# Patient Record
Sex: FEMALE | Race: WHITE | Employment: UNEMPLOYED | URBAN - NONMETROPOLITAN AREA
[De-identification: names, ages, dates, MRNs, and addresses within clinical notes are randomized per-mention and may not be internally consistent; named-entity substitution may affect disease eponyms.]

---

## 2017-02-06 ENCOUNTER — OFFICE VISIT (OUTPATIENT)
Dept: PRIMARY CARE CLINIC | Age: 37
End: 2017-02-06
Payer: COMMERCIAL

## 2017-02-06 ENCOUNTER — TELEPHONE (OUTPATIENT)
Dept: PRIMARY CARE CLINIC | Age: 37
End: 2017-02-06

## 2017-02-06 VITALS
BODY MASS INDEX: 31.81 KG/M2 | TEMPERATURE: 98.2 F | HEART RATE: 65 BPM | DIASTOLIC BLOOD PRESSURE: 78 MMHG | WEIGHT: 168.5 LBS | SYSTOLIC BLOOD PRESSURE: 132 MMHG | OXYGEN SATURATION: 96 % | HEIGHT: 61 IN

## 2017-02-06 DIAGNOSIS — R05.9 COUGH: ICD-10-CM

## 2017-02-06 DIAGNOSIS — M54.31 SCIATICA OF RIGHT SIDE: ICD-10-CM

## 2017-02-06 DIAGNOSIS — R50.9 FEVER, UNSPECIFIED FEVER CAUSE: Primary | ICD-10-CM

## 2017-02-06 DIAGNOSIS — J03.90 TONSILLITIS: ICD-10-CM

## 2017-02-06 DIAGNOSIS — R11.0 NAUSEA: ICD-10-CM

## 2017-02-06 DIAGNOSIS — J06.9 UPPER RESPIRATORY TRACT INFECTION, UNSPECIFIED TYPE: ICD-10-CM

## 2017-02-06 DIAGNOSIS — G62.9 NEUROPATHY: ICD-10-CM

## 2017-02-06 LAB
INFLUENZA A ANTIBODY: NORMAL
INFLUENZA B ANTIBODY: NORMAL

## 2017-02-06 PROCEDURE — 87880 STREP A ASSAY W/OPTIC: CPT | Performed by: NURSE PRACTITIONER

## 2017-02-06 PROCEDURE — 99214 OFFICE O/P EST MOD 30 MIN: CPT | Performed by: NURSE PRACTITIONER

## 2017-02-06 PROCEDURE — 87804 INFLUENZA ASSAY W/OPTIC: CPT | Performed by: NURSE PRACTITIONER

## 2017-02-06 RX ORDER — GABAPENTIN 800 MG/1
800 TABLET ORAL 4 TIMES DAILY
COMMUNITY
End: 2017-02-06 | Stop reason: SDUPTHER

## 2017-02-06 RX ORDER — GABAPENTIN 800 MG/1
800 TABLET ORAL 4 TIMES DAILY
Qty: 120 TABLET | Refills: 1 | Status: SHIPPED | OUTPATIENT
Start: 2017-02-06 | End: 2017-03-03 | Stop reason: SDUPTHER

## 2017-02-06 RX ORDER — METOPROLOL SUCCINATE 50 MG/1
TABLET, EXTENDED RELEASE ORAL
Qty: 30 TABLET | Refills: 11 | Status: SHIPPED | OUTPATIENT
Start: 2017-02-06 | End: 2017-12-01 | Stop reason: SDUPTHER

## 2017-02-06 RX ORDER — VENLAFAXINE HYDROCHLORIDE 75 MG/1
75 CAPSULE, EXTENDED RELEASE ORAL DAILY
COMMUNITY
End: 2017-02-06 | Stop reason: SDUPTHER

## 2017-02-06 RX ORDER — VENLAFAXINE HYDROCHLORIDE 75 MG/1
75 CAPSULE, EXTENDED RELEASE ORAL DAILY
Qty: 30 CAPSULE | Refills: 5 | Status: SHIPPED | OUTPATIENT
Start: 2017-02-06 | End: 2017-04-03 | Stop reason: SDUPTHER

## 2017-02-06 RX ORDER — ONDANSETRON 4 MG/1
4 TABLET, ORALLY DISINTEGRATING ORAL EVERY 8 HOURS PRN
Qty: 20 TABLET | Refills: 0 | Status: SHIPPED | OUTPATIENT
Start: 2017-02-06 | End: 2017-02-13 | Stop reason: SDUPTHER

## 2017-02-06 RX ORDER — CEFDINIR 300 MG/1
300 CAPSULE ORAL 2 TIMES DAILY
Qty: 20 CAPSULE | Refills: 0 | Status: SHIPPED | OUTPATIENT
Start: 2017-02-06 | End: 2017-02-16

## 2017-02-06 RX ORDER — BENZONATATE 200 MG/1
200 CAPSULE ORAL 3 TIMES DAILY PRN
Qty: 30 CAPSULE | Refills: 0 | Status: SHIPPED | OUTPATIENT
Start: 2017-02-06 | End: 2017-04-03

## 2017-02-06 ASSESSMENT — ENCOUNTER SYMPTOMS
SORE THROAT: 1
EYE DISCHARGE: 0
RHINORRHEA: 1
COUGH: 1
TROUBLE SWALLOWING: 0
DIARRHEA: 0
BLOOD IN STOOL: 0
BACK PAIN: 1
EYE REDNESS: 0
ABDOMINAL PAIN: 0
WHEEZING: 1
CONSTIPATION: 0

## 2017-02-13 DIAGNOSIS — R11.0 NAUSEA: ICD-10-CM

## 2017-02-13 RX ORDER — ONDANSETRON 4 MG/1
4 TABLET, ORALLY DISINTEGRATING ORAL EVERY 8 HOURS PRN
Qty: 20 TABLET | Refills: 0 | Status: SHIPPED | OUTPATIENT
Start: 2017-02-13 | End: 2017-04-03 | Stop reason: SDUPTHER

## 2017-02-27 DIAGNOSIS — F11.21 OPIOID DEPENDENCE IN REMISSION (HCC): ICD-10-CM

## 2017-02-27 RX ORDER — CYCLOBENZAPRINE HCL 10 MG
10 TABLET ORAL EVERY 8 HOURS PRN
Qty: 30 TABLET | Refills: 1 | Status: SHIPPED | OUTPATIENT
Start: 2017-02-27 | End: 2017-04-25 | Stop reason: SDUPTHER

## 2017-03-03 RX ORDER — GABAPENTIN 800 MG/1
TABLET ORAL
Qty: 120 TABLET | Refills: 3 | Status: SHIPPED | OUTPATIENT
Start: 2017-03-03 | End: 2017-06-23 | Stop reason: SDUPTHER

## 2017-04-03 ENCOUNTER — OFFICE VISIT (OUTPATIENT)
Dept: PRIMARY CARE CLINIC | Age: 37
End: 2017-04-03
Payer: COMMERCIAL

## 2017-04-03 VITALS
HEIGHT: 61 IN | DIASTOLIC BLOOD PRESSURE: 80 MMHG | HEART RATE: 86 BPM | TEMPERATURE: 98.6 F | WEIGHT: 171.75 LBS | SYSTOLIC BLOOD PRESSURE: 118 MMHG | BODY MASS INDEX: 32.42 KG/M2 | OXYGEN SATURATION: 98 %

## 2017-04-03 DIAGNOSIS — F33.41 RECURRENT MAJOR DEPRESSIVE DISORDER, IN PARTIAL REMISSION (HCC): ICD-10-CM

## 2017-04-03 DIAGNOSIS — F41.9 ANXIETY: ICD-10-CM

## 2017-04-03 DIAGNOSIS — G47.09 OTHER INSOMNIA: ICD-10-CM

## 2017-04-03 DIAGNOSIS — R11.0 NAUSEA: ICD-10-CM

## 2017-04-03 DIAGNOSIS — F41.8 SITUATIONAL ANXIETY: ICD-10-CM

## 2017-04-03 DIAGNOSIS — Z79.891 ENCOUNTER FOR LONG-TERM METHADONE USE: Primary | ICD-10-CM

## 2017-04-03 PROCEDURE — 99213 OFFICE O/P EST LOW 20 MIN: CPT | Performed by: NURSE PRACTITIONER

## 2017-04-03 RX ORDER — QUETIAPINE FUMARATE 25 MG/1
25 TABLET, FILM COATED ORAL NIGHTLY
Qty: 30 TABLET | Refills: 5 | Status: SHIPPED | OUTPATIENT
Start: 2017-04-03 | End: 2017-04-04 | Stop reason: SDUPTHER

## 2017-04-03 RX ORDER — HYDROXYZINE PAMOATE 25 MG/1
25 CAPSULE ORAL 3 TIMES DAILY PRN
Qty: 60 CAPSULE | Refills: 5 | Status: SHIPPED | OUTPATIENT
Start: 2017-04-03 | End: 2017-04-17

## 2017-04-03 RX ORDER — ONDANSETRON 4 MG/1
4 TABLET, ORALLY DISINTEGRATING ORAL EVERY 8 HOURS PRN
Qty: 20 TABLET | Refills: 0 | Status: SHIPPED | OUTPATIENT
Start: 2017-04-03 | End: 2017-05-30

## 2017-04-03 RX ORDER — VENLAFAXINE HYDROCHLORIDE 37.5 MG/1
37.5 CAPSULE, EXTENDED RELEASE ORAL DAILY
Qty: 30 CAPSULE | Refills: 5 | Status: SHIPPED | OUTPATIENT
Start: 2017-04-03 | End: 2017-09-15 | Stop reason: SDUPTHER

## 2017-04-03 RX ORDER — VENLAFAXINE HYDROCHLORIDE 75 MG/1
75 CAPSULE, EXTENDED RELEASE ORAL DAILY
Qty: 30 CAPSULE | Refills: 5 | Status: SHIPPED | OUTPATIENT
Start: 2017-04-03 | End: 2018-06-14 | Stop reason: SDUPTHER

## 2017-04-03 ASSESSMENT — ENCOUNTER SYMPTOMS
SORE THROAT: 0
CONSTIPATION: 0
ABDOMINAL PAIN: 0
BLOOD IN STOOL: 0
COUGH: 0
EYE REDNESS: 0
TROUBLE SWALLOWING: 0
RHINORRHEA: 0
EYE DISCHARGE: 0
WHEEZING: 0
DIARRHEA: 0
NAUSEA: 0

## 2017-04-04 ENCOUNTER — TELEPHONE (OUTPATIENT)
Dept: PRIMARY CARE CLINIC | Age: 37
End: 2017-04-04

## 2017-04-04 DIAGNOSIS — F33.41 RECURRENT MAJOR DEPRESSIVE DISORDER, IN PARTIAL REMISSION (HCC): ICD-10-CM

## 2017-04-04 DIAGNOSIS — G47.09 OTHER INSOMNIA: ICD-10-CM

## 2017-04-04 DIAGNOSIS — F41.8 SITUATIONAL ANXIETY: ICD-10-CM

## 2017-04-04 RX ORDER — QUETIAPINE FUMARATE 50 MG/1
50 TABLET, FILM COATED ORAL NIGHTLY
Qty: 30 TABLET | Refills: 2 | Status: SHIPPED | OUTPATIENT
Start: 2017-04-04 | End: 2017-05-27 | Stop reason: SDUPTHER

## 2017-04-25 DIAGNOSIS — F11.21 OPIOID DEPENDENCE IN REMISSION (HCC): ICD-10-CM

## 2017-04-25 RX ORDER — CYCLOBENZAPRINE HCL 10 MG
10 TABLET ORAL EVERY 8 HOURS
Qty: 30 TABLET | Refills: 0 | Status: SHIPPED | OUTPATIENT
Start: 2017-04-25 | End: 2017-09-06 | Stop reason: SDUPTHER

## 2017-05-27 DIAGNOSIS — F41.8 SITUATIONAL ANXIETY: ICD-10-CM

## 2017-05-27 DIAGNOSIS — G47.09 OTHER INSOMNIA: ICD-10-CM

## 2017-05-27 DIAGNOSIS — F33.41 RECURRENT MAJOR DEPRESSIVE DISORDER, IN PARTIAL REMISSION (HCC): ICD-10-CM

## 2017-05-30 ENCOUNTER — OFFICE VISIT (OUTPATIENT)
Dept: PRIMARY CARE CLINIC | Age: 37
End: 2017-05-30
Payer: COMMERCIAL

## 2017-05-30 VITALS
DIASTOLIC BLOOD PRESSURE: 80 MMHG | HEIGHT: 61 IN | WEIGHT: 183 LBS | OXYGEN SATURATION: 95 % | TEMPERATURE: 97.5 F | SYSTOLIC BLOOD PRESSURE: 110 MMHG | BODY MASS INDEX: 34.55 KG/M2 | HEART RATE: 86 BPM

## 2017-05-30 DIAGNOSIS — K12.30 STOMATITIS AND MUCOSITIS: Primary | ICD-10-CM

## 2017-05-30 DIAGNOSIS — F41.8 SITUATIONAL ANXIETY: ICD-10-CM

## 2017-05-30 DIAGNOSIS — R11.0 NAUSEA: ICD-10-CM

## 2017-05-30 DIAGNOSIS — F33.41 RECURRENT MAJOR DEPRESSIVE DISORDER, IN PARTIAL REMISSION (HCC): ICD-10-CM

## 2017-05-30 DIAGNOSIS — J02.9 SORE THROAT: ICD-10-CM

## 2017-05-30 DIAGNOSIS — F32.9 REACTIVE DEPRESSION: ICD-10-CM

## 2017-05-30 DIAGNOSIS — F41.1 GAD (GENERALIZED ANXIETY DISORDER): ICD-10-CM

## 2017-05-30 DIAGNOSIS — G47.09 OTHER INSOMNIA: ICD-10-CM

## 2017-05-30 DIAGNOSIS — K12.1 STOMATITIS AND MUCOSITIS: Primary | ICD-10-CM

## 2017-05-30 LAB — S PYO AG THROAT QL: NORMAL

## 2017-05-30 PROCEDURE — 87880 STREP A ASSAY W/OPTIC: CPT | Performed by: NURSE PRACTITIONER

## 2017-05-30 PROCEDURE — 99213 OFFICE O/P EST LOW 20 MIN: CPT | Performed by: NURSE PRACTITIONER

## 2017-05-30 RX ORDER — QUETIAPINE FUMARATE 50 MG/1
50 TABLET, FILM COATED ORAL NIGHTLY
Qty: 30 TABLET | Refills: 5
Start: 2017-05-30 | End: 2017-05-30 | Stop reason: SDUPTHER

## 2017-05-30 RX ORDER — ONDANSETRON 4 MG/1
4 TABLET, ORALLY DISINTEGRATING ORAL EVERY 8 HOURS PRN
Qty: 30 TABLET | Refills: 1 | Status: SHIPPED | OUTPATIENT
Start: 2017-05-30 | End: 2017-06-23 | Stop reason: SDUPTHER

## 2017-05-30 RX ORDER — HYDROXYZINE PAMOATE 50 MG/1
50 CAPSULE ORAL 3 TIMES DAILY PRN
Qty: 90 CAPSULE | Refills: 1 | Status: SHIPPED | OUTPATIENT
Start: 2017-05-30 | End: 2017-06-26 | Stop reason: SDUPTHER

## 2017-05-30 RX ORDER — QUETIAPINE FUMARATE 100 MG/1
150 TABLET, FILM COATED ORAL NIGHTLY
Qty: 45 TABLET | Refills: 3 | Status: SHIPPED | OUTPATIENT
Start: 2017-05-30 | End: 2017-08-25 | Stop reason: SDUPTHER

## 2017-05-30 RX ORDER — HYDROXYZINE PAMOATE 25 MG/1
CAPSULE ORAL
Refills: 5 | COMMUNITY
Start: 2017-05-01 | End: 2017-05-30 | Stop reason: SDUPTHER

## 2017-05-30 ASSESSMENT — ENCOUNTER SYMPTOMS
SHORTNESS OF BREATH: 0
EYE REDNESS: 0
NAUSEA: 1
CONSTIPATION: 0
RHINORRHEA: 0
SORE THROAT: 1
DIARRHEA: 0
COUGH: 1
VOMITING: 0

## 2017-06-23 DIAGNOSIS — R11.0 NAUSEA: ICD-10-CM

## 2017-06-23 RX ORDER — ONDANSETRON 4 MG/1
4 TABLET, ORALLY DISINTEGRATING ORAL EVERY 8 HOURS PRN
Qty: 30 TABLET | Refills: 0 | Status: SHIPPED | OUTPATIENT
Start: 2017-06-23 | End: 2017-06-27

## 2017-06-23 RX ORDER — VENLAFAXINE HYDROCHLORIDE 75 MG/1
75 CAPSULE, EXTENDED RELEASE ORAL DAILY
Qty: 30 CAPSULE | Refills: 5 | Status: SHIPPED | OUTPATIENT
Start: 2017-06-23 | End: 2017-07-07 | Stop reason: SDUPTHER

## 2017-06-23 RX ORDER — GABAPENTIN 800 MG/1
800 TABLET ORAL 4 TIMES DAILY
Qty: 120 TABLET | Refills: 0 | Status: SHIPPED | OUTPATIENT
Start: 2017-06-23 | End: 2017-07-21 | Stop reason: SDUPTHER

## 2017-06-26 DIAGNOSIS — F41.1 GAD (GENERALIZED ANXIETY DISORDER): ICD-10-CM

## 2017-06-26 DIAGNOSIS — G47.09 OTHER INSOMNIA: ICD-10-CM

## 2017-06-26 DIAGNOSIS — F33.41 RECURRENT MAJOR DEPRESSIVE DISORDER, IN PARTIAL REMISSION (HCC): ICD-10-CM

## 2017-06-26 DIAGNOSIS — F41.8 SITUATIONAL ANXIETY: ICD-10-CM

## 2017-06-26 RX ORDER — QUETIAPINE FUMARATE 50 MG/1
TABLET, FILM COATED ORAL
Qty: 30 TABLET | Refills: 5 | Status: SHIPPED | OUTPATIENT
Start: 2017-06-26 | End: 2017-11-16 | Stop reason: SDUPTHER

## 2017-06-26 RX ORDER — HYDROXYZINE PAMOATE 50 MG/1
CAPSULE ORAL
Qty: 90 CAPSULE | Refills: 5 | Status: SHIPPED | OUTPATIENT
Start: 2017-06-26 | End: 2017-07-28

## 2017-06-27 ENCOUNTER — APPOINTMENT (OUTPATIENT)
Dept: GENERAL RADIOLOGY | Age: 37
DRG: 195 | End: 2017-06-27
Payer: COMMERCIAL

## 2017-06-27 ENCOUNTER — HOSPITAL ENCOUNTER (INPATIENT)
Age: 37
LOS: 1 days | Discharge: LEFT BEFORE DISCHARGE | DRG: 195 | End: 2017-06-28
Attending: EMERGENCY MEDICINE | Admitting: HOSPITALIST
Payer: COMMERCIAL

## 2017-06-27 ENCOUNTER — OFFICE VISIT (OUTPATIENT)
Dept: PRIMARY CARE CLINIC | Age: 37
End: 2017-06-27
Payer: COMMERCIAL

## 2017-06-27 VITALS
SYSTOLIC BLOOD PRESSURE: 134 MMHG | BODY MASS INDEX: 34.73 KG/M2 | WEIGHT: 196 LBS | TEMPERATURE: 98 F | HEIGHT: 63 IN | HEART RATE: 92 BPM | OXYGEN SATURATION: 90 % | DIASTOLIC BLOOD PRESSURE: 86 MMHG

## 2017-06-27 DIAGNOSIS — R09.02 HYPOXIA: Primary | ICD-10-CM

## 2017-06-27 DIAGNOSIS — J44.1 CHRONIC OBSTRUCTIVE PULMONARY DISEASE WITH ACUTE EXACERBATION (HCC): ICD-10-CM

## 2017-06-27 DIAGNOSIS — J18.9 PNEUMONIA DUE TO ORGANISM: ICD-10-CM

## 2017-06-27 DIAGNOSIS — J18.9 CAP (COMMUNITY ACQUIRED PNEUMONIA): Primary | ICD-10-CM

## 2017-06-27 LAB
ALBUMIN SERPL-MCNC: 3.5 G/DL (ref 3.5–5.2)
ALP BLD-CCNC: 74 U/L (ref 35–104)
ALT SERPL-CCNC: 24 U/L (ref 5–33)
ANION GAP SERPL CALCULATED.3IONS-SCNC: 11 MMOL/L (ref 7–19)
AST SERPL-CCNC: 31 U/L (ref 5–32)
ATYPICAL LYMPHOCYTE RELATIVE PERCENT: 1 % (ref 0–8)
BANDED NEUTROPHILS RELATIVE PERCENT: 11 % (ref 0–5)
BASE EXCESS ARTERIAL: 4 MMOL/L (ref -2–2)
BASOPHILS ABSOLUTE: 0.3 K/UL (ref 0–0.2)
BASOPHILS MANUAL: 2 %
BASOPHILS RELATIVE PERCENT: 2 % (ref 0–1)
BILIRUB SERPL-MCNC: 0.4 MG/DL (ref 0.2–1.2)
BILIRUBIN URINE: NEGATIVE
BLOOD, URINE: NEGATIVE
BUN BLDV-MCNC: 10 MG/DL (ref 6–20)
CALCIUM SERPL-MCNC: 8.6 MG/DL (ref 8.6–10)
CARBOXYHEMOGLOBIN ARTERIAL: 5.2 % (ref 0–5)
CHLORIDE BLD-SCNC: 99 MMOL/L (ref 98–111)
CLARITY: CLEAR
CO2: 27 MMOL/L (ref 22–29)
COLOR: YELLOW
CREAT SERPL-MCNC: 0.7 MG/DL (ref 0.5–0.9)
EOSINOPHILS ABSOLUTE: 0.14 K/UL (ref 0–0.6)
EOSINOPHILS RELATIVE PERCENT: 1 % (ref 0–5)
GFR NON-AFRICAN AMERICAN: >60
GLUCOSE BLD-MCNC: 119 MG/DL (ref 74–109)
GLUCOSE URINE: NEGATIVE MG/DL
HCG(URINE) PREGNANCY TEST: NEGATIVE
HCO3 ARTERIAL: 30.9 MMOL/L (ref 22–26)
HCT VFR BLD CALC: 37.2 % (ref 37–47)
HEMOGLOBIN, ART, EXTENDED: 12.7 G/DL (ref 12–16)
HEMOGLOBIN: 12 G/DL (ref 12–16)
KETONES, URINE: NEGATIVE MG/DL
LACTIC ACID: 2.3 MG/DL (ref 0.5–1.9)
LEUKOCYTE ESTERASE, URINE: NEGATIVE
LYMPHOCYTES ABSOLUTE: 0.7 K/UL (ref 1.1–4.5)
LYMPHOCYTES RELATIVE PERCENT: 4 % (ref 20–40)
MACROCYTES: ABNORMAL
MAGNESIUM: 2 MG/DL (ref 1.6–2.6)
MCH RBC QN AUTO: 30.8 PG (ref 27–31)
MCHC RBC AUTO-ENTMCNC: 32.3 G/DL (ref 33–37)
MCV RBC AUTO: 95.6 FL (ref 81–99)
METHEMOGLOBIN ARTERIAL: 1.2 %
MICROCYTES: ABNORMAL
MONOCYTES ABSOLUTE: 0.9 K/UL (ref 0–0.9)
MONOCYTES RELATIVE PERCENT: 6 % (ref 0–10)
NEUTROPHILS ABSOLUTE: 12.3 K/UL (ref 1.5–7.5)
NEUTROPHILS MANUAL: 75 %
NEUTROPHILS RELATIVE PERCENT: 75 % (ref 50–65)
NITRITE, URINE: NEGATIVE
O2 CONTENT ARTERIAL: 16.3 ML/DL
O2 SAT, ARTERIAL: 90.8 %
O2 THERAPY: ABNORMAL
PCO2 ARTERIAL: 56 MMHG (ref 35–45)
PDW BLD-RTO: 12.9 % (ref 11.5–14.5)
PH ARTERIAL: 7.35 (ref 7.35–7.45)
PH UA: 7.5
PLATELET # BLD: 220 K/UL (ref 130–400)
PLATELET SLIDE REVIEW: ADEQUATE
PMV BLD AUTO: 9.2 FL (ref 9.4–12.3)
PO2 ARTERIAL: 71 MMHG (ref 80–100)
POTASSIUM SERPL-SCNC: 4.5 MMOL/L (ref 3.5–5)
POTASSIUM, WHOLE BLOOD: 4.4
PROTEIN UA: NEGATIVE MG/DL
RBC # BLD: 3.89 M/UL (ref 4.2–5.4)
SODIUM BLD-SCNC: 137 MMOL/L (ref 136–145)
SPECIFIC GRAVITY UA: 1.02
TOTAL PROTEIN: 6.6 G/DL (ref 6.6–8.7)
UROBILINOGEN, URINE: 1 E.U./DL
WBC # BLD: 14.3 K/UL (ref 4.8–10.8)

## 2017-06-27 PROCEDURE — 81025 URINE PREGNANCY TEST: CPT

## 2017-06-27 PROCEDURE — 87040 BLOOD CULTURE FOR BACTERIA: CPT

## 2017-06-27 PROCEDURE — 2000000000 HC ICU R&B

## 2017-06-27 PROCEDURE — 6360000002 HC RX W HCPCS: Performed by: EMERGENCY MEDICINE

## 2017-06-27 PROCEDURE — 2700000000 HC OXYGEN THERAPY PER DAY

## 2017-06-27 PROCEDURE — 6370000000 HC RX 637 (ALT 250 FOR IP): Performed by: FAMILY MEDICINE

## 2017-06-27 PROCEDURE — 87086 URINE CULTURE/COLONY COUNT: CPT

## 2017-06-27 PROCEDURE — 83605 ASSAY OF LACTIC ACID: CPT

## 2017-06-27 PROCEDURE — 96374 THER/PROPH/DIAG INJ IV PUSH: CPT

## 2017-06-27 PROCEDURE — 36415 COLL VENOUS BLD VENIPUNCTURE: CPT

## 2017-06-27 PROCEDURE — 86403 PARTICLE AGGLUT ANTBDY SCRN: CPT

## 2017-06-27 PROCEDURE — 6370000000 HC RX 637 (ALT 250 FOR IP): Performed by: EMERGENCY MEDICINE

## 2017-06-27 PROCEDURE — 85025 COMPLETE CBC W/AUTO DIFF WBC: CPT

## 2017-06-27 PROCEDURE — 80053 COMPREHEN METABOLIC PANEL: CPT

## 2017-06-27 PROCEDURE — 87077 CULTURE AEROBIC IDENTIFY: CPT

## 2017-06-27 PROCEDURE — 87185 SC STD ENZYME DETCJ PER NZM: CPT

## 2017-06-27 PROCEDURE — 96375 TX/PRO/DX INJ NEW DRUG ADDON: CPT

## 2017-06-27 PROCEDURE — 2580000003 HC RX 258: Performed by: EMERGENCY MEDICINE

## 2017-06-27 PROCEDURE — 83735 ASSAY OF MAGNESIUM: CPT

## 2017-06-27 PROCEDURE — 84132 ASSAY OF SERUM POTASSIUM: CPT

## 2017-06-27 PROCEDURE — 6360000002 HC RX W HCPCS: Performed by: FAMILY MEDICINE

## 2017-06-27 PROCEDURE — 94640 AIRWAY INHALATION TREATMENT: CPT

## 2017-06-27 PROCEDURE — 93005 ELECTROCARDIOGRAM TRACING: CPT

## 2017-06-27 PROCEDURE — 81003 URINALYSIS AUTO W/O SCOPE: CPT

## 2017-06-27 PROCEDURE — 99214 OFFICE O/P EST MOD 30 MIN: CPT | Performed by: NURSE PRACTITIONER

## 2017-06-27 PROCEDURE — 99285 EMERGENCY DEPT VISIT HI MDM: CPT

## 2017-06-27 PROCEDURE — 99284 EMERGENCY DEPT VISIT MOD MDM: CPT | Performed by: EMERGENCY MEDICINE

## 2017-06-27 PROCEDURE — 87070 CULTURE OTHR SPECIMN AEROBIC: CPT

## 2017-06-27 PROCEDURE — 82803 BLOOD GASES ANY COMBINATION: CPT

## 2017-06-27 PROCEDURE — 36600 WITHDRAWAL OF ARTERIAL BLOOD: CPT

## 2017-06-27 PROCEDURE — 99223 1ST HOSP IP/OBS HIGH 75: CPT | Performed by: FAMILY MEDICINE

## 2017-06-27 PROCEDURE — 87205 SMEAR GRAM STAIN: CPT

## 2017-06-27 PROCEDURE — 2580000003 HC RX 258: Performed by: FAMILY MEDICINE

## 2017-06-27 PROCEDURE — 71010 XR CHEST PORTABLE: CPT

## 2017-06-27 RX ORDER — METHYLPREDNISOLONE SODIUM SUCCINATE 125 MG/2ML
125 INJECTION, POWDER, LYOPHILIZED, FOR SOLUTION INTRAMUSCULAR; INTRAVENOUS ONCE
Status: COMPLETED | OUTPATIENT
Start: 2017-06-27 | End: 2017-06-27

## 2017-06-27 RX ORDER — LEVOFLOXACIN 5 MG/ML
750 INJECTION, SOLUTION INTRAVENOUS ONCE
Status: COMPLETED | OUTPATIENT
Start: 2017-06-27 | End: 2017-06-27

## 2017-06-27 RX ORDER — SODIUM CHLORIDE 0.9 % (FLUSH) 0.9 %
10 SYRINGE (ML) INJECTION PRN
Status: DISCONTINUED | OUTPATIENT
Start: 2017-06-27 | End: 2017-06-28 | Stop reason: HOSPADM

## 2017-06-27 RX ORDER — 0.9 % SODIUM CHLORIDE 0.9 %
1000 INTRAVENOUS SOLUTION INTRAVENOUS ONCE
Status: COMPLETED | OUTPATIENT
Start: 2017-06-27 | End: 2017-06-27

## 2017-06-27 RX ORDER — ONDANSETRON 2 MG/ML
4 INJECTION INTRAMUSCULAR; INTRAVENOUS EVERY 6 HOURS PRN
Status: DISCONTINUED | OUTPATIENT
Start: 2017-06-27 | End: 2017-06-28 | Stop reason: HOSPADM

## 2017-06-27 RX ORDER — DIAZEPAM 5 MG/1
5 TABLET ORAL ONCE
Status: DISCONTINUED | OUTPATIENT
Start: 2017-06-27 | End: 2017-06-27

## 2017-06-27 RX ORDER — DIAZEPAM 5 MG/1
5 TABLET ORAL ONCE
Status: COMPLETED | OUTPATIENT
Start: 2017-06-27 | End: 2017-06-27

## 2017-06-27 RX ORDER — SODIUM CHLORIDE 0.9 % (FLUSH) 0.9 %
10 SYRINGE (ML) INJECTION EVERY 12 HOURS SCHEDULED
Status: DISCONTINUED | OUTPATIENT
Start: 2017-06-27 | End: 2017-06-28 | Stop reason: HOSPADM

## 2017-06-27 RX ORDER — CEFTRIAXONE 500 MG/1
1000 INJECTION, POWDER, FOR SOLUTION INTRAMUSCULAR; INTRAVENOUS ONCE
Status: DISCONTINUED | OUTPATIENT
Start: 2017-06-27 | End: 2017-06-28

## 2017-06-27 RX ORDER — HYDROXYZINE PAMOATE 25 MG/1
25 CAPSULE ORAL 3 TIMES DAILY PRN
Status: DISCONTINUED | OUTPATIENT
Start: 2017-06-27 | End: 2017-06-28 | Stop reason: HOSPADM

## 2017-06-27 RX ORDER — GABAPENTIN 400 MG/1
800 CAPSULE ORAL 3 TIMES DAILY
Status: DISCONTINUED | OUTPATIENT
Start: 2017-06-27 | End: 2017-06-28 | Stop reason: HOSPADM

## 2017-06-27 RX ORDER — SODIUM CHLORIDE 9 MG/ML
INJECTION, SOLUTION INTRAVENOUS CONTINUOUS
Status: DISCONTINUED | OUTPATIENT
Start: 2017-06-27 | End: 2017-06-28 | Stop reason: HOSPADM

## 2017-06-27 RX ORDER — CYCLOBENZAPRINE HCL 10 MG
10 TABLET ORAL EVERY 8 HOURS
Status: DISCONTINUED | OUTPATIENT
Start: 2017-06-27 | End: 2017-06-28 | Stop reason: HOSPADM

## 2017-06-27 RX ORDER — ACETAMINOPHEN 325 MG/1
650 TABLET ORAL EVERY 4 HOURS PRN
Status: DISCONTINUED | OUTPATIENT
Start: 2017-06-27 | End: 2017-06-28 | Stop reason: HOSPADM

## 2017-06-27 RX ORDER — ALBUTEROL SULFATE 2.5 MG/3ML
2.5 SOLUTION RESPIRATORY (INHALATION) ONCE
Status: DISCONTINUED | OUTPATIENT
Start: 2017-06-27 | End: 2017-06-28

## 2017-06-27 RX ORDER — ACETAMINOPHEN 500 MG
1000 TABLET ORAL ONCE
Status: COMPLETED | OUTPATIENT
Start: 2017-06-27 | End: 2017-06-27

## 2017-06-27 RX ORDER — ONDANSETRON 2 MG/ML
4 INJECTION INTRAMUSCULAR; INTRAVENOUS ONCE
Status: COMPLETED | OUTPATIENT
Start: 2017-06-27 | End: 2017-06-27

## 2017-06-27 RX ORDER — ALBUTEROL SULFATE 2.5 MG/3ML
2.5 SOLUTION RESPIRATORY (INHALATION)
Status: DISCONTINUED | OUTPATIENT
Start: 2017-06-27 | End: 2017-06-28 | Stop reason: HOSPADM

## 2017-06-27 RX ORDER — LEVOFLOXACIN 5 MG/ML
750 INJECTION, SOLUTION INTRAVENOUS EVERY 24 HOURS
Status: DISCONTINUED | OUTPATIENT
Start: 2017-06-28 | End: 2017-06-28 | Stop reason: HOSPADM

## 2017-06-27 RX ORDER — NICOTINE 21 MG/24HR
1 PATCH, TRANSDERMAL 24 HOURS TRANSDERMAL DAILY
Status: DISCONTINUED | OUTPATIENT
Start: 2017-06-27 | End: 2017-06-28 | Stop reason: HOSPADM

## 2017-06-27 RX ORDER — DOXYCYCLINE 100 MG/1
100 CAPSULE ORAL 2 TIMES DAILY
Qty: 20 CAPSULE | Refills: 0 | Status: SHIPPED | OUTPATIENT
Start: 2017-06-27 | End: 2017-06-27

## 2017-06-27 RX ORDER — ALBUTEROL SULFATE 2.5 MG/3ML
2.5 SOLUTION RESPIRATORY (INHALATION) EVERY 6 HOURS PRN
Qty: 120 EACH | Refills: 3 | Status: SHIPPED | OUTPATIENT
Start: 2017-06-27 | End: 2017-06-27

## 2017-06-27 RX ORDER — PANTOPRAZOLE SODIUM 40 MG/1
40 TABLET, DELAYED RELEASE ORAL DAILY
Status: DISCONTINUED | OUTPATIENT
Start: 2017-06-28 | End: 2017-06-28 | Stop reason: HOSPADM

## 2017-06-27 RX ORDER — KETOROLAC TROMETHAMINE 30 MG/ML
30 INJECTION, SOLUTION INTRAMUSCULAR; INTRAVENOUS ONCE
Status: COMPLETED | OUTPATIENT
Start: 2017-06-27 | End: 2017-06-27

## 2017-06-27 RX ADMIN — ALBUTEROL SULFATE 2.5 MG: 2.5 SOLUTION RESPIRATORY (INHALATION) at 13:04

## 2017-06-27 RX ADMIN — METHYLPREDNISOLONE SODIUM SUCCINATE 125 MG: 125 INJECTION, POWDER, FOR SOLUTION INTRAMUSCULAR; INTRAVENOUS at 13:34

## 2017-06-27 RX ADMIN — DIAZEPAM 5 MG: 5 TABLET ORAL at 13:18

## 2017-06-27 RX ADMIN — GABAPENTIN 800 MG: 400 CAPSULE ORAL at 20:17

## 2017-06-27 RX ADMIN — ALBUTEROL SULFATE 2.5 MG: 2.5 SOLUTION RESPIRATORY (INHALATION) at 19:52

## 2017-06-27 RX ADMIN — HYDROXYZINE PAMOATE 25 MG: 25 CAPSULE ORAL at 20:17

## 2017-06-27 RX ADMIN — ONDANSETRON 4 MG: 2 INJECTION INTRAMUSCULAR; INTRAVENOUS at 13:34

## 2017-06-27 RX ADMIN — Medication 10 ML: at 20:17

## 2017-06-27 RX ADMIN — KETOROLAC TROMETHAMINE 30 MG: 30 INJECTION, SOLUTION INTRAMUSCULAR at 13:33

## 2017-06-27 RX ADMIN — ONDANSETRON 4 MG: 2 INJECTION INTRAMUSCULAR; INTRAVENOUS at 20:17

## 2017-06-27 RX ADMIN — ACETAMINOPHEN 1000 MG: 500 TABLET ORAL at 15:51

## 2017-06-27 RX ADMIN — IPRATROPIUM BROMIDE 0.5 MG: 0.5 SOLUTION RESPIRATORY (INHALATION) at 13:04

## 2017-06-27 RX ADMIN — CYCLOBENZAPRINE HYDROCHLORIDE 10 MG: 10 TABLET, FILM COATED ORAL at 18:11

## 2017-06-27 RX ADMIN — MAGNESIUM SULFATE HEPTAHYDRATE 2 G: 500 INJECTION, SOLUTION INTRAMUSCULAR; INTRAVENOUS at 13:34

## 2017-06-27 RX ADMIN — SODIUM CHLORIDE 1000 ML: 9 INJECTION, SOLUTION INTRAVENOUS at 13:34

## 2017-06-27 RX ADMIN — SODIUM CHLORIDE 1000 ML: 9 INJECTION, SOLUTION INTRAVENOUS at 14:44

## 2017-06-27 RX ADMIN — SODIUM CHLORIDE: 9 INJECTION, SOLUTION INTRAVENOUS at 18:03

## 2017-06-27 RX ADMIN — LEVOFLOXACIN 750 MG: 5 INJECTION, SOLUTION INTRAVENOUS at 15:51

## 2017-06-27 RX ADMIN — SODIUM CHLORIDE 1000 ML: 9 INJECTION, SOLUTION INTRAVENOUS at 15:36

## 2017-06-27 RX ADMIN — ENOXAPARIN SODIUM 40 MG: 40 INJECTION SUBCUTANEOUS at 18:11

## 2017-06-27 ASSESSMENT — ENCOUNTER SYMPTOMS
VOMITING: 0
EYE PAIN: 0
EYE REDNESS: 0
SHORTNESS OF BREATH: 1
SORE THROAT: 0
CONSTIPATION: 0
DIARRHEA: 0
RHINORRHEA: 0
COUGH: 1
SORE THROAT: 1
RHINORRHEA: 0
NAUSEA: 0
PHOTOPHOBIA: 0
BACK PAIN: 0
ABDOMINAL PAIN: 0
WHEEZING: 1
WHEEZING: 1
SHORTNESS OF BREATH: 1
DIARRHEA: 0
CHEST TIGHTNESS: 0
COUGH: 1
VOMITING: 0

## 2017-06-27 ASSESSMENT — PAIN SCALES - GENERAL
PAINLEVEL_OUTOF10: 0
PAINLEVEL_OUTOF10: 8
PAINLEVEL_OUTOF10: 8

## 2017-06-27 ASSESSMENT — PAIN DESCRIPTION - ORIENTATION: ORIENTATION: LEFT

## 2017-06-27 ASSESSMENT — PAIN DESCRIPTION - LOCATION: LOCATION: BACK;HIP

## 2017-06-28 ENCOUNTER — TELEPHONE (OUTPATIENT)
Dept: PRIMARY CARE CLINIC | Age: 37
End: 2017-06-28

## 2017-06-28 VITALS
TEMPERATURE: 97.1 F | OXYGEN SATURATION: 96 % | HEART RATE: 95 BPM | WEIGHT: 190 LBS | RESPIRATION RATE: 20 BRPM | BODY MASS INDEX: 33.66 KG/M2 | DIASTOLIC BLOOD PRESSURE: 86 MMHG | SYSTOLIC BLOOD PRESSURE: 126 MMHG | HEIGHT: 63 IN

## 2017-06-28 PROBLEM — J18.9 CAP (COMMUNITY ACQUIRED PNEUMONIA): Status: ACTIVE | Noted: 2017-06-27

## 2017-06-28 PROBLEM — J18.9 CAP (COMMUNITY ACQUIRED PNEUMONIA): Status: ACTIVE | Noted: 2017-06-28

## 2017-06-28 PROBLEM — I95.9 HYPOTENSION: Status: ACTIVE | Noted: 2017-06-27

## 2017-06-28 PROBLEM — I95.9 HYPOTENSION: Status: ACTIVE | Noted: 2017-06-28

## 2017-06-28 LAB
ANION GAP SERPL CALCULATED.3IONS-SCNC: 10 MMOL/L (ref 7–19)
BANDED NEUTROPHILS RELATIVE PERCENT: 3 % (ref 0–5)
BASOPHILS ABSOLUTE: 0 K/UL (ref 0–0.2)
BASOPHILS MANUAL: 0 %
BASOPHILS RELATIVE PERCENT: 0 % (ref 0–1)
BUN BLDV-MCNC: 12 MG/DL (ref 6–20)
CALCIUM SERPL-MCNC: 7.9 MG/DL (ref 8.6–10)
CHLORIDE BLD-SCNC: 108 MMOL/L (ref 98–111)
CO2: 24 MMOL/L (ref 22–29)
CREAT SERPL-MCNC: 0.6 MG/DL (ref 0.5–0.9)
EOSINOPHILS ABSOLUTE: 0 K/UL (ref 0–0.6)
EOSINOPHILS RELATIVE PERCENT: 0 % (ref 0–5)
GFR NON-AFRICAN AMERICAN: >60
GLUCOSE BLD-MCNC: 167 MG/DL (ref 74–109)
HCT VFR BLD CALC: 34.6 % (ref 37–47)
HEMOGLOBIN: 11 G/DL (ref 12–16)
LYMPHOCYTES ABSOLUTE: 1.2 K/UL (ref 1.1–4.5)
LYMPHOCYTES RELATIVE PERCENT: 9 % (ref 20–40)
MCH RBC QN AUTO: 31.4 PG (ref 27–31)
MCHC RBC AUTO-ENTMCNC: 31.8 G/DL (ref 33–37)
MCV RBC AUTO: 98.9 FL (ref 81–99)
MONOCYTES ABSOLUTE: 0.5 K/UL (ref 0–0.9)
MONOCYTES RELATIVE PERCENT: 4 % (ref 0–10)
NEUTROPHILS ABSOLUTE: 11.5 K/UL (ref 1.5–7.5)
NEUTROPHILS MANUAL: 84 %
NEUTROPHILS RELATIVE PERCENT: 84 % (ref 50–65)
PDW BLD-RTO: 13.1 % (ref 11.5–14.5)
PLATELET # BLD: 244 K/UL (ref 130–400)
PLATELET SLIDE REVIEW: ADEQUATE
PMV BLD AUTO: 9.3 FL (ref 9.4–12.3)
POTASSIUM SERPL-SCNC: 4.1 MMOL/L (ref 3.5–5)
RBC # BLD: 3.5 M/UL (ref 4.2–5.4)
RBC # BLD: NORMAL 10*6/UL
SODIUM BLD-SCNC: 142 MMOL/L (ref 136–145)
WBC # BLD: 13.2 K/UL (ref 4.8–10.8)

## 2017-06-28 PROCEDURE — 99239 HOSP IP/OBS DSCHRG MGMT >30: CPT | Performed by: FAMILY MEDICINE

## 2017-06-28 PROCEDURE — 2700000000 HC OXYGEN THERAPY PER DAY

## 2017-06-28 PROCEDURE — 80048 BASIC METABOLIC PNL TOTAL CA: CPT

## 2017-06-28 PROCEDURE — 36415 COLL VENOUS BLD VENIPUNCTURE: CPT

## 2017-06-28 PROCEDURE — 2580000003 HC RX 258: Performed by: FAMILY MEDICINE

## 2017-06-28 PROCEDURE — 6370000000 HC RX 637 (ALT 250 FOR IP): Performed by: FAMILY MEDICINE

## 2017-06-28 PROCEDURE — 85025 COMPLETE CBC W/AUTO DIFF WBC: CPT

## 2017-06-28 RX ORDER — LEVOFLOXACIN 500 MG/1
500 TABLET, FILM COATED ORAL DAILY
Qty: 12 TABLET | Refills: 0 | Status: SHIPPED | OUTPATIENT
Start: 2017-06-28 | End: 2017-07-07 | Stop reason: ALTCHOICE

## 2017-06-28 RX ORDER — VENLAFAXINE HYDROCHLORIDE 37.5 MG/1
37.5 CAPSULE, EXTENDED RELEASE ORAL DAILY
Status: CANCELLED | OUTPATIENT
Start: 2017-06-28

## 2017-06-28 RX ORDER — VENLAFAXINE HYDROCHLORIDE 75 MG/1
75 CAPSULE, EXTENDED RELEASE ORAL DAILY
Status: CANCELLED | OUTPATIENT
Start: 2017-06-28

## 2017-06-28 RX ORDER — METHADONE HYDROCHLORIDE 10 MG/1
120 TABLET ORAL DAILY
Status: DISCONTINUED | OUTPATIENT
Start: 2017-06-28 | End: 2017-06-28 | Stop reason: HOSPADM

## 2017-06-28 RX ORDER — SODIUM CHLORIDE 9 MG/ML
INJECTION, SOLUTION INTRAVENOUS CONTINUOUS
Status: CANCELLED | OUTPATIENT
Start: 2017-06-28

## 2017-06-28 RX ADMIN — CYCLOBENZAPRINE HYDROCHLORIDE 10 MG: 10 TABLET, FILM COATED ORAL at 08:23

## 2017-06-28 RX ADMIN — METHADONE HYDROCHLORIDE 120 MG: 10 TABLET ORAL at 09:47

## 2017-06-28 RX ADMIN — GABAPENTIN 800 MG: 400 CAPSULE ORAL at 08:23

## 2017-06-28 RX ADMIN — Medication 10 ML: at 08:24

## 2017-06-28 RX ADMIN — CYCLOBENZAPRINE HYDROCHLORIDE 10 MG: 10 TABLET, FILM COATED ORAL at 01:47

## 2017-06-28 RX ADMIN — SODIUM CHLORIDE: 9 INJECTION, SOLUTION INTRAVENOUS at 01:36

## 2017-06-28 RX ADMIN — PANTOPRAZOLE SODIUM 40 MG: 40 TABLET, DELAYED RELEASE ORAL at 08:23

## 2017-06-28 RX ADMIN — HYDROXYZINE PAMOATE 25 MG: 25 CAPSULE ORAL at 08:28

## 2017-06-28 RX ADMIN — ACETAMINOPHEN 650 MG: 325 TABLET, FILM COATED ORAL at 01:47

## 2017-06-28 ASSESSMENT — PAIN SCALES - GENERAL
PAINLEVEL_OUTOF10: 10
PAINLEVEL_OUTOF10: 8
PAINLEVEL_OUTOF10: 8
PAINLEVEL_OUTOF10: 0

## 2017-06-28 ASSESSMENT — PAIN DESCRIPTION - LOCATION: LOCATION: BACK

## 2017-06-28 ASSESSMENT — PAIN DESCRIPTION - PAIN TYPE: TYPE: CHRONIC PAIN

## 2017-06-28 ASSESSMENT — PAIN DESCRIPTION - DESCRIPTORS: DESCRIPTORS: DISCOMFORT;SORE;SPASM

## 2017-06-28 ASSESSMENT — PAIN DESCRIPTION - FREQUENCY: FREQUENCY: INTERMITTENT

## 2017-06-28 ASSESSMENT — PAIN DESCRIPTION - PROGRESSION: CLINICAL_PROGRESSION: GRADUALLY WORSENING

## 2017-06-28 ASSESSMENT — PAIN DESCRIPTION - ONSET: ONSET: GRADUAL

## 2017-06-29 ENCOUNTER — TELEPHONE (OUTPATIENT)
Dept: PRIMARY CARE CLINIC | Age: 37
End: 2017-06-29

## 2017-06-30 LAB
CULTURE, RESPIRATORY: ABNORMAL
CULTURE, RESPIRATORY: ABNORMAL
GRAM STAIN RESULT: ABNORMAL
ORGANISM: ABNORMAL

## 2017-07-01 LAB
ORGANISM: ABNORMAL
URINE CULTURE, ROUTINE: ABNORMAL
URINE CULTURE, ROUTINE: ABNORMAL

## 2017-07-02 LAB
BLOOD CULTURE, ROUTINE: NORMAL
CULTURE, BLOOD 2: NORMAL

## 2017-07-04 ENCOUNTER — APPOINTMENT (OUTPATIENT)
Dept: CT IMAGING | Age: 37
DRG: 193 | End: 2017-07-04
Payer: COMMERCIAL

## 2017-07-04 ENCOUNTER — APPOINTMENT (OUTPATIENT)
Dept: GENERAL RADIOLOGY | Age: 37
DRG: 193 | End: 2017-07-04
Payer: COMMERCIAL

## 2017-07-04 ENCOUNTER — HOSPITAL ENCOUNTER (INPATIENT)
Age: 37
LOS: 1 days | Discharge: LEFT BEFORE DISCHARGE | DRG: 193 | End: 2017-07-05
Attending: EMERGENCY MEDICINE | Admitting: INTERNAL MEDICINE
Payer: COMMERCIAL

## 2017-07-04 DIAGNOSIS — R91.1 LUNG NODULE SEEN ON IMAGING STUDY: ICD-10-CM

## 2017-07-04 DIAGNOSIS — R41.82 ALTERED MENTAL STATUS, UNSPECIFIED: Primary | ICD-10-CM

## 2017-07-04 DIAGNOSIS — J18.9 CAP (COMMUNITY ACQUIRED PNEUMONIA): ICD-10-CM

## 2017-07-04 LAB
ACETAMINOPHEN LEVEL: <15 UG/ML
ALBUMIN SERPL-MCNC: 3.3 G/DL (ref 3.5–5.2)
ALP BLD-CCNC: 69 U/L (ref 35–104)
ALT SERPL-CCNC: 13 U/L (ref 5–33)
ANION GAP SERPL CALCULATED.3IONS-SCNC: 14 MMOL/L (ref 7–19)
AST SERPL-CCNC: 13 U/L (ref 5–32)
ATYPICAL LYMPHOCYTE RELATIVE PERCENT: 5 % (ref 0–8)
BASE EXCESS ARTERIAL: 3.5 MMOL/L (ref -2–2)
BASOPHILS ABSOLUTE: 0.1 K/UL (ref 0–0.2)
BASOPHILS MANUAL: 1 %
BASOPHILS RELATIVE PERCENT: 1 % (ref 0–1)
BILIRUB SERPL-MCNC: <0.2 MG/DL (ref 0.2–1.2)
BUN BLDV-MCNC: 16 MG/DL (ref 6–20)
CALCIUM SERPL-MCNC: 8.7 MG/DL (ref 8.6–10)
CARBOXYHEMOGLOBIN ARTERIAL: 5.2 % (ref 0–5)
CHLORIDE BLD-SCNC: 97 MMOL/L (ref 98–111)
CO2: 26 MMOL/L (ref 22–29)
CREAT SERPL-MCNC: 0.9 MG/DL (ref 0.5–0.9)
EOSINOPHILS ABSOLUTE: 0.62 K/UL (ref 0–0.6)
EOSINOPHILS RELATIVE PERCENT: 7 % (ref 0–5)
ETHANOL: <10 MG/DL (ref 0–0.08)
GFR NON-AFRICAN AMERICAN: >60
GLUCOSE BLD-MCNC: 80 MG/DL (ref 74–109)
HCO3 ARTERIAL: 28.5 MMOL/L (ref 22–26)
HCT VFR BLD CALC: 38.4 % (ref 37–47)
HEMOGLOBIN, ART, EXTENDED: 11.2 G/DL (ref 12–16)
HEMOGLOBIN: 12.2 G/DL (ref 12–16)
LACTIC ACID: 1.3 MG/DL (ref 0.5–1.9)
LIPASE: 29 U/L (ref 13–60)
LYMPHOCYTES ABSOLUTE: 4.9 K/UL (ref 1.1–4.5)
LYMPHOCYTES RELATIVE PERCENT: 51 % (ref 20–40)
MCH RBC QN AUTO: 30.7 PG (ref 27–31)
MCHC RBC AUTO-ENTMCNC: 31.8 G/DL (ref 33–37)
MCV RBC AUTO: 96.7 FL (ref 81–99)
METHEMOGLOBIN ARTERIAL: 1.1 %
MONOCYTES ABSOLUTE: 0.4 K/UL (ref 0–0.9)
MONOCYTES RELATIVE PERCENT: 5 % (ref 0–10)
NEUTROPHILS ABSOLUTE: 2.7 K/UL (ref 1.5–7.5)
NEUTROPHILS MANUAL: 31 %
NEUTROPHILS RELATIVE PERCENT: 31 % (ref 50–65)
O2 CONTENT ARTERIAL: 14.3 ML/DL
O2 SAT, ARTERIAL: 90.3 %
O2 THERAPY: ABNORMAL
PCO2 ARTERIAL: 44 MMHG (ref 35–45)
PDW BLD-RTO: 12.7 % (ref 11.5–14.5)
PH ARTERIAL: 7.42 (ref 7.35–7.45)
PLATELET # BLD: 357 K/UL (ref 130–400)
PLATELET SLIDE REVIEW: ADEQUATE
PMV BLD AUTO: 8.5 FL (ref 9.4–12.3)
PO2 ARTERIAL: 63 MMHG (ref 80–100)
POTASSIUM SERPL-SCNC: 3.9 MMOL/L (ref 3.5–5)
POTASSIUM, WHOLE BLOOD: 3.9
RBC # BLD: 3.97 M/UL (ref 4.2–5.4)
RBC # BLD: NORMAL 10*6/UL
SALICYLATE, SERUM: <3 MG/DL (ref 3–10)
SODIUM BLD-SCNC: 137 MMOL/L (ref 136–145)
TOTAL PROTEIN: 7.2 G/DL (ref 6.6–8.7)
WBC # BLD: 8.8 K/UL (ref 4.8–10.8)

## 2017-07-04 PROCEDURE — 6360000004 HC RX CONTRAST MEDICATION: Performed by: EMERGENCY MEDICINE

## 2017-07-04 PROCEDURE — 2140000000 HC CCU INTERMEDIATE R&B

## 2017-07-04 PROCEDURE — 36415 COLL VENOUS BLD VENIPUNCTURE: CPT

## 2017-07-04 PROCEDURE — 87040 BLOOD CULTURE FOR BACTERIA: CPT

## 2017-07-04 PROCEDURE — 2580000003 HC RX 258: Performed by: EMERGENCY MEDICINE

## 2017-07-04 PROCEDURE — 99284 EMERGENCY DEPT VISIT MOD MDM: CPT | Performed by: EMERGENCY MEDICINE

## 2017-07-04 PROCEDURE — 85025 COMPLETE CBC W/AUTO DIFF WBC: CPT

## 2017-07-04 PROCEDURE — 71275 CT ANGIOGRAPHY CHEST: CPT

## 2017-07-04 PROCEDURE — 70450 CT HEAD/BRAIN W/O DYE: CPT

## 2017-07-04 PROCEDURE — 99285 EMERGENCY DEPT VISIT HI MDM: CPT

## 2017-07-04 PROCEDURE — 80053 COMPREHEN METABOLIC PANEL: CPT

## 2017-07-04 PROCEDURE — G0480 DRUG TEST DEF 1-7 CLASSES: HCPCS

## 2017-07-04 PROCEDURE — 93005 ELECTROCARDIOGRAM TRACING: CPT

## 2017-07-04 PROCEDURE — 83605 ASSAY OF LACTIC ACID: CPT

## 2017-07-04 PROCEDURE — 71010 XR CHEST PORTABLE: CPT

## 2017-07-04 PROCEDURE — 83690 ASSAY OF LIPASE: CPT

## 2017-07-04 PROCEDURE — 6360000002 HC RX W HCPCS: Performed by: EMERGENCY MEDICINE

## 2017-07-04 RX ORDER — VANCOMYCIN HYDROCHLORIDE 1 G/200ML
1000 INJECTION, SOLUTION INTRAVENOUS ONCE
Status: COMPLETED | OUTPATIENT
Start: 2017-07-04 | End: 2017-07-04

## 2017-07-04 RX ORDER — 0.9 % SODIUM CHLORIDE 0.9 %
1000 INTRAVENOUS SOLUTION INTRAVENOUS ONCE
Status: COMPLETED | OUTPATIENT
Start: 2017-07-04 | End: 2017-07-05

## 2017-07-04 RX ORDER — 0.9 % SODIUM CHLORIDE 0.9 %
1000 INTRAVENOUS SOLUTION INTRAVENOUS ONCE
Status: COMPLETED | OUTPATIENT
Start: 2017-07-04 | End: 2017-07-04

## 2017-07-04 RX ORDER — VANCOMYCIN HYDROCHLORIDE 1 G/200ML
1000 INJECTION, SOLUTION INTRAVENOUS EVERY 8 HOURS
Status: DISCONTINUED | OUTPATIENT
Start: 2017-07-04 | End: 2017-07-05

## 2017-07-04 RX ADMIN — SODIUM CHLORIDE 1000 ML: 9 INJECTION, SOLUTION INTRAVENOUS at 22:16

## 2017-07-04 RX ADMIN — VANCOMYCIN HYDROCHLORIDE 1000 MG: 1 INJECTION, SOLUTION INTRAVENOUS at 22:14

## 2017-07-04 RX ADMIN — IOVERSOL 90 ML: 741 INJECTION INTRA-ARTERIAL; INTRAVENOUS at 21:20

## 2017-07-04 RX ADMIN — SODIUM CHLORIDE 1000 ML: 9 INJECTION, SOLUTION INTRAVENOUS at 20:09

## 2017-07-04 ASSESSMENT — PAIN SCALES - GENERAL
PAINLEVEL_OUTOF10: 7
PAINLEVEL_OUTOF10: 8

## 2017-07-04 ASSESSMENT — PAIN DESCRIPTION - PAIN TYPE: TYPE: ACUTE PAIN

## 2017-07-04 ASSESSMENT — PAIN DESCRIPTION - FREQUENCY: FREQUENCY: CONTINUOUS

## 2017-07-04 ASSESSMENT — PAIN DESCRIPTION - LOCATION: LOCATION: ABDOMEN

## 2017-07-04 ASSESSMENT — PAIN DESCRIPTION - ORIENTATION: ORIENTATION: RIGHT;UPPER

## 2017-07-05 VITALS
OXYGEN SATURATION: 96 % | HEART RATE: 84 BPM | RESPIRATION RATE: 14 BRPM | WEIGHT: 171.2 LBS | SYSTOLIC BLOOD PRESSURE: 106 MMHG | DIASTOLIC BLOOD PRESSURE: 68 MMHG | HEIGHT: 64 IN | BODY MASS INDEX: 29.23 KG/M2 | TEMPERATURE: 97.3 F

## 2017-07-05 LAB
ALBUMIN SERPL-MCNC: 3.3 G/DL (ref 3.5–5.2)
ALP BLD-CCNC: 64 U/L (ref 35–104)
ALT SERPL-CCNC: 12 U/L (ref 5–33)
ANION GAP SERPL CALCULATED.3IONS-SCNC: 15 MMOL/L (ref 7–19)
AST SERPL-CCNC: 14 U/L (ref 5–32)
BASOPHILS ABSOLUTE: 0.1 K/UL (ref 0–0.2)
BASOPHILS RELATIVE PERCENT: 0.5 % (ref 0–1)
BILIRUB SERPL-MCNC: <0.2 MG/DL (ref 0.2–1.2)
BUN BLDV-MCNC: 14 MG/DL (ref 6–20)
CALCIUM SERPL-MCNC: 8.5 MG/DL (ref 8.6–10)
CHLORIDE BLD-SCNC: 104 MMOL/L (ref 98–111)
CHOLESTEROL, TOTAL: 231 MG/DL (ref 160–199)
CO2: 22 MMOL/L (ref 22–29)
CREAT SERPL-MCNC: 0.9 MG/DL (ref 0.5–0.9)
EOSINOPHILS ABSOLUTE: 0.2 K/UL (ref 0–0.6)
EOSINOPHILS RELATIVE PERCENT: 1.8 % (ref 0–5)
GFR NON-AFRICAN AMERICAN: >60
GLUCOSE BLD-MCNC: 107 MG/DL (ref 74–109)
HBA1C MFR BLD: 5.1 %
HCT VFR BLD CALC: 36 % (ref 37–47)
HDLC SERPL-MCNC: 32 MG/DL (ref 65–121)
HEMOGLOBIN: 11.5 G/DL (ref 12–16)
INR BLD: 1.03 (ref 0.88–1.18)
LDL CHOLESTEROL CALCULATED: 177 MG/DL
LYMPHOCYTES ABSOLUTE: 1.6 K/UL (ref 1.1–4.5)
LYMPHOCYTES RELATIVE PERCENT: 17.6 % (ref 20–40)
MAGNESIUM: 2.1 MG/DL (ref 1.6–2.6)
MCH RBC QN AUTO: 30.6 PG (ref 27–31)
MCHC RBC AUTO-ENTMCNC: 31.9 G/DL (ref 33–37)
MCV RBC AUTO: 95.7 FL (ref 81–99)
MONOCYTES ABSOLUTE: 0.3 K/UL (ref 0–0.9)
MONOCYTES RELATIVE PERCENT: 3.4 % (ref 0–10)
NEUTROPHILS ABSOLUTE: 6.9 K/UL (ref 1.5–7.5)
NEUTROPHILS RELATIVE PERCENT: 74.4 % (ref 50–65)
PDW BLD-RTO: 12.7 % (ref 11.5–14.5)
PHOSPHORUS: 2.6 MG/DL (ref 2.5–4.5)
PLATELET # BLD: 344 K/UL (ref 130–400)
PMV BLD AUTO: 8.7 FL (ref 9.4–12.3)
POTASSIUM SERPL-SCNC: 4.4 MMOL/L (ref 3.5–5)
PROTHROMBIN TIME: 13.4 SEC (ref 12–14.6)
RBC # BLD: 3.76 M/UL (ref 4.2–5.4)
SODIUM BLD-SCNC: 141 MMOL/L (ref 136–145)
TOTAL PROTEIN: 6.1 G/DL (ref 6.6–8.7)
TRIGL SERPL-MCNC: 109 MG/DL (ref 150–199)
TROPONIN: <0.01 NG/ML (ref 0–0.03)
TSH SERPL DL<=0.05 MIU/L-ACNC: 0.95 UIU/ML (ref 0.27–4.2)
VITAMIN B-12: 885 PG/ML (ref 211–946)
WBC # BLD: 9.2 K/UL (ref 4.8–10.8)

## 2017-07-05 PROCEDURE — 99238 HOSP IP/OBS DSCHRG MGMT 30/<: CPT | Performed by: HOSPITALIST

## 2017-07-05 PROCEDURE — 6360000002 HC RX W HCPCS: Performed by: INTERNAL MEDICINE

## 2017-07-05 PROCEDURE — 84443 ASSAY THYROID STIM HORMONE: CPT

## 2017-07-05 PROCEDURE — 36600 WITHDRAWAL OF ARTERIAL BLOOD: CPT

## 2017-07-05 PROCEDURE — 82803 BLOOD GASES ANY COMBINATION: CPT

## 2017-07-05 PROCEDURE — 85610 PROTHROMBIN TIME: CPT

## 2017-07-05 PROCEDURE — 85025 COMPLETE CBC W/AUTO DIFF WBC: CPT

## 2017-07-05 PROCEDURE — 84484 ASSAY OF TROPONIN QUANT: CPT

## 2017-07-05 PROCEDURE — 84100 ASSAY OF PHOSPHORUS: CPT

## 2017-07-05 PROCEDURE — 84132 ASSAY OF SERUM POTASSIUM: CPT

## 2017-07-05 PROCEDURE — 82607 VITAMIN B-12: CPT

## 2017-07-05 PROCEDURE — 94640 AIRWAY INHALATION TREATMENT: CPT

## 2017-07-05 PROCEDURE — 80061 LIPID PANEL: CPT

## 2017-07-05 PROCEDURE — 83735 ASSAY OF MAGNESIUM: CPT

## 2017-07-05 PROCEDURE — 93005 ELECTROCARDIOGRAM TRACING: CPT

## 2017-07-05 PROCEDURE — 99222 1ST HOSP IP/OBS MODERATE 55: CPT | Performed by: INTERNAL MEDICINE

## 2017-07-05 PROCEDURE — 36415 COLL VENOUS BLD VENIPUNCTURE: CPT

## 2017-07-05 PROCEDURE — 80053 COMPREHEN METABOLIC PANEL: CPT

## 2017-07-05 PROCEDURE — 86592 SYPHILIS TEST NON-TREP QUAL: CPT

## 2017-07-05 PROCEDURE — 83036 HEMOGLOBIN GLYCOSYLATED A1C: CPT

## 2017-07-05 PROCEDURE — 6370000000 HC RX 637 (ALT 250 FOR IP): Performed by: INTERNAL MEDICINE

## 2017-07-05 RX ORDER — METHYLPREDNISOLONE SODIUM SUCCINATE 40 MG/ML
40 INJECTION, POWDER, LYOPHILIZED, FOR SOLUTION INTRAMUSCULAR; INTRAVENOUS EVERY 12 HOURS
Status: DISCONTINUED | OUTPATIENT
Start: 2017-07-05 | End: 2017-07-05 | Stop reason: HOSPADM

## 2017-07-05 RX ORDER — ASPIRIN 81 MG/1
81 TABLET, CHEWABLE ORAL DAILY
Status: DISCONTINUED | OUTPATIENT
Start: 2017-07-05 | End: 2017-07-05 | Stop reason: HOSPADM

## 2017-07-05 RX ORDER — SODIUM CHLORIDE 9 MG/ML
INJECTION, SOLUTION INTRAVENOUS CONTINUOUS
Status: DISCONTINUED | OUTPATIENT
Start: 2017-07-05 | End: 2017-07-05 | Stop reason: HOSPADM

## 2017-07-05 RX ORDER — IPRATROPIUM BROMIDE AND ALBUTEROL SULFATE 2.5; .5 MG/3ML; MG/3ML
1 SOLUTION RESPIRATORY (INHALATION)
Status: DISCONTINUED | OUTPATIENT
Start: 2017-07-05 | End: 2017-07-05 | Stop reason: HOSPADM

## 2017-07-05 RX ORDER — LEVOFLOXACIN 5 MG/ML
750 INJECTION, SOLUTION INTRAVENOUS EVERY 24 HOURS
Status: DISCONTINUED | OUTPATIENT
Start: 2017-07-05 | End: 2017-07-05 | Stop reason: HOSPADM

## 2017-07-05 RX ADMIN — LEVOFLOXACIN 750 MG: 5 INJECTION, SOLUTION INTRAVENOUS at 04:05

## 2017-07-05 RX ADMIN — METHYLPREDNISOLONE SODIUM SUCCINATE 40 MG: 40 INJECTION, POWDER, FOR SOLUTION INTRAMUSCULAR; INTRAVENOUS at 04:05

## 2017-07-05 RX ADMIN — IPRATROPIUM BROMIDE AND ALBUTEROL SULFATE 1 AMPULE: .5; 3 SOLUTION RESPIRATORY (INHALATION) at 07:40

## 2017-07-06 LAB — RPR: NORMAL

## 2017-07-07 ENCOUNTER — OFFICE VISIT (OUTPATIENT)
Dept: PRIMARY CARE CLINIC | Age: 37
End: 2017-07-07
Payer: COMMERCIAL

## 2017-07-07 ENCOUNTER — TELEPHONE (OUTPATIENT)
Dept: PRIMARY CARE CLINIC | Age: 37
End: 2017-07-07

## 2017-07-07 VITALS
TEMPERATURE: 97.8 F | HEIGHT: 64 IN | HEART RATE: 85 BPM | DIASTOLIC BLOOD PRESSURE: 80 MMHG | BODY MASS INDEX: 31.54 KG/M2 | OXYGEN SATURATION: 97 % | SYSTOLIC BLOOD PRESSURE: 130 MMHG | WEIGHT: 184.75 LBS

## 2017-07-07 DIAGNOSIS — Z98.890 HISTORY OF TRACHEOSTOMY: ICD-10-CM

## 2017-07-07 DIAGNOSIS — F11.20 PATIENT ON METHADONE MAINTENANCE THERAPY (HCC): ICD-10-CM

## 2017-07-07 DIAGNOSIS — R91.8 PULMONARY NODULES/LESIONS, MULTIPLE: ICD-10-CM

## 2017-07-07 DIAGNOSIS — B95.8 STAPHYLOCOCCAL INFECTION: Primary | ICD-10-CM

## 2017-07-07 DIAGNOSIS — R04.2 BLOOD IN SPUTUM: ICD-10-CM

## 2017-07-07 DIAGNOSIS — Z72.0 TOBACCO ABUSE: ICD-10-CM

## 2017-07-07 LAB
CULTURE, BLOOD 2: ABNORMAL
ORGANISM: ABNORMAL

## 2017-07-07 PROCEDURE — 99214 OFFICE O/P EST MOD 30 MIN: CPT | Performed by: NURSE PRACTITIONER

## 2017-07-07 RX ORDER — AMOXICILLIN AND CLAVULANATE POTASSIUM 875; 125 MG/1; MG/1
1 TABLET, FILM COATED ORAL 2 TIMES DAILY
Qty: 28 TABLET | Refills: 0 | Status: SHIPPED | OUTPATIENT
Start: 2017-07-07 | End: 2017-11-10 | Stop reason: SDUPTHER

## 2017-07-07 ASSESSMENT — ENCOUNTER SYMPTOMS
TROUBLE SWALLOWING: 0
STRIDOR: 0
COUGH: 1
CHEST TIGHTNESS: 0
SHORTNESS OF BREATH: 0
WHEEZING: 0
SORE THROAT: 0

## 2017-07-10 LAB
BLOOD CULTURE, ROUTINE: NORMAL
EKG P AXIS: 55 DEGREES
EKG P AXIS: 67 DEGREES
EKG P-R INTERVAL: 140 MS
EKG P-R INTERVAL: 146 MS
EKG Q-T INTERVAL: 382 MS
EKG Q-T INTERVAL: 402 MS
EKG QRS DURATION: 84 MS
EKG QRS DURATION: 86 MS
EKG QTC CALCULATION (BAZETT): 417 MS
EKG QTC CALCULATION (BAZETT): 428 MS
EKG T AXIS: 36 DEGREES
EKG T AXIS: 41 DEGREES

## 2017-07-17 ENCOUNTER — HOSPITAL ENCOUNTER (OUTPATIENT)
Dept: NUCLEAR MEDICINE | Age: 37
Discharge: HOME | End: 2017-07-17
Payer: COMMERCIAL

## 2017-07-17 ENCOUNTER — HOSPITAL ENCOUNTER (OUTPATIENT)
Dept: NON INVASIVE DIAGNOSTICS | Age: 37
Discharge: HOME | End: 2017-07-17
Payer: COMMERCIAL

## 2017-07-17 DIAGNOSIS — R91.8 PULMONARY NODULES/LESIONS, MULTIPLE: ICD-10-CM

## 2017-07-17 DIAGNOSIS — R04.2 BLOOD IN SPUTUM: ICD-10-CM

## 2017-07-17 DIAGNOSIS — Z72.0 TOBACCO ABUSE: ICD-10-CM

## 2017-07-17 DIAGNOSIS — B95.8 STAPHYLOCOCCAL INFECTION: ICD-10-CM

## 2017-07-17 LAB
LV EF: 60 %
LVEF MODALITY: NORMAL

## 2017-07-17 PROCEDURE — 93306 TTE W/DOPPLER COMPLETE: CPT

## 2017-07-19 ENCOUNTER — TELEPHONE (OUTPATIENT)
Dept: PRIMARY CARE CLINIC | Age: 37
End: 2017-07-19

## 2017-07-21 ENCOUNTER — HOSPITAL ENCOUNTER (OUTPATIENT)
Dept: NUCLEAR MEDICINE | Age: 37
Discharge: HOME | End: 2017-07-21
Payer: COMMERCIAL

## 2017-07-21 DIAGNOSIS — Z72.0 TOBACCO ABUSE: ICD-10-CM

## 2017-07-21 DIAGNOSIS — R91.8 PULMONARY NODULES/LESIONS, MULTIPLE: ICD-10-CM

## 2017-07-21 DIAGNOSIS — R04.2 BLOOD IN SPUTUM: ICD-10-CM

## 2017-07-21 DIAGNOSIS — B95.8 STAPHYLOCOCCAL INFECTION: ICD-10-CM

## 2017-07-21 PROCEDURE — A9552 F18 FDG: HCPCS | Performed by: NURSE PRACTITIONER

## 2017-07-21 PROCEDURE — 3430000000 HC RX DIAGNOSTIC RADIOPHARMACEUTICAL: Performed by: NURSE PRACTITIONER

## 2017-07-21 PROCEDURE — 78815 PET IMAGE W/CT SKULL-THIGH: CPT

## 2017-07-21 RX ORDER — FLUDEOXYGLUCOSE F 18 200 MCI/ML
10 INJECTION, SOLUTION INTRAVENOUS
Status: COMPLETED | OUTPATIENT
Start: 2017-07-21 | End: 2017-07-21

## 2017-07-21 RX ORDER — GABAPENTIN 800 MG/1
800 TABLET ORAL 4 TIMES DAILY
Qty: 120 TABLET | Refills: 3 | OUTPATIENT
Start: 2017-07-21 | End: 2017-09-15 | Stop reason: SDUPTHER

## 2017-07-21 RX ADMIN — FLUDEOXYGLUCOSE F 18 10 MILLICURIE: 200 INJECTION, SOLUTION INTRAVENOUS at 15:42

## 2017-07-24 ENCOUNTER — TELEPHONE (OUTPATIENT)
Dept: PRIMARY CARE CLINIC | Age: 37
End: 2017-07-24

## 2017-07-28 ENCOUNTER — OFFICE VISIT (OUTPATIENT)
Dept: PRIMARY CARE CLINIC | Age: 37
End: 2017-07-28
Payer: COMMERCIAL

## 2017-07-28 VITALS
WEIGHT: 197 LBS | SYSTOLIC BLOOD PRESSURE: 126 MMHG | BODY MASS INDEX: 33.63 KG/M2 | OXYGEN SATURATION: 98 % | DIASTOLIC BLOOD PRESSURE: 80 MMHG | HEIGHT: 64 IN | TEMPERATURE: 98.6 F | HEART RATE: 71 BPM

## 2017-07-28 DIAGNOSIS — E66.9 OBESITY (BMI 30-39.9): ICD-10-CM

## 2017-07-28 DIAGNOSIS — R94.8 ABNORMAL POSITRON EMISSION TOMOGRAPHY (PET) SCAN: ICD-10-CM

## 2017-07-28 DIAGNOSIS — R91.1 LUNG NODULE SEEN ON IMAGING STUDY: Primary | ICD-10-CM

## 2017-07-28 DIAGNOSIS — Z98.890 HISTORY OF TRACHEOSTOMY: ICD-10-CM

## 2017-07-28 DIAGNOSIS — R10.11 RUQ PAIN: ICD-10-CM

## 2017-07-28 DIAGNOSIS — Z72.0 TOBACCO ABUSE: ICD-10-CM

## 2017-07-28 PROCEDURE — 99214 OFFICE O/P EST MOD 30 MIN: CPT | Performed by: NURSE PRACTITIONER

## 2017-07-28 RX ORDER — BUPROPION HYDROCHLORIDE 150 MG/1
150 TABLET, EXTENDED RELEASE ORAL DAILY
Qty: 30 TABLET | Refills: 3 | Status: SHIPPED | OUTPATIENT
Start: 2017-07-28 | End: 2017-09-01

## 2017-07-28 ASSESSMENT — ENCOUNTER SYMPTOMS
NAUSEA: 0
CHEST TIGHTNESS: 0
VOICE CHANGE: 0
WHEEZING: 0
DIARRHEA: 0
BLOOD IN STOOL: 0
EYE PAIN: 0
COUGH: 0
PHOTOPHOBIA: 0
BACK PAIN: 1
TROUBLE SWALLOWING: 0
VOMITING: 0
CONSTIPATION: 0
SINUS PRESSURE: 0
SORE THROAT: 0
SHORTNESS OF BREATH: 0
ABDOMINAL PAIN: 1
EYE DISCHARGE: 0

## 2017-07-31 ENCOUNTER — TELEPHONE (OUTPATIENT)
Dept: PRIMARY CARE CLINIC | Age: 37
End: 2017-07-31

## 2017-08-08 ENCOUNTER — TELEPHONE (OUTPATIENT)
Dept: PRIMARY CARE CLINIC | Age: 37
End: 2017-08-08

## 2017-08-08 ENCOUNTER — HOSPITAL ENCOUNTER (OUTPATIENT)
Dept: GENERAL RADIOLOGY | Age: 37
Discharge: HOME | End: 2017-08-08
Payer: COMMERCIAL

## 2017-08-08 DIAGNOSIS — R10.11 RUQ PAIN: ICD-10-CM

## 2017-08-08 PROCEDURE — 76705 ECHO EXAM OF ABDOMEN: CPT

## 2017-08-25 DIAGNOSIS — F33.41 RECURRENT MAJOR DEPRESSIVE DISORDER, IN PARTIAL REMISSION (HCC): ICD-10-CM

## 2017-08-25 DIAGNOSIS — F41.8 SITUATIONAL ANXIETY: ICD-10-CM

## 2017-08-25 RX ORDER — QUETIAPINE FUMARATE 100 MG/1
TABLET, FILM COATED ORAL
Qty: 45 TABLET | Refills: 11 | Status: SHIPPED | OUTPATIENT
Start: 2017-08-25 | End: 2018-08-10 | Stop reason: SDUPTHER

## 2017-09-01 ENCOUNTER — OFFICE VISIT (OUTPATIENT)
Dept: PRIMARY CARE CLINIC | Age: 37
End: 2017-09-01
Payer: COMMERCIAL

## 2017-09-01 VITALS
TEMPERATURE: 98 F | BODY MASS INDEX: 33.46 KG/M2 | WEIGHT: 196 LBS | SYSTOLIC BLOOD PRESSURE: 138 MMHG | DIASTOLIC BLOOD PRESSURE: 72 MMHG | OXYGEN SATURATION: 96 % | HEIGHT: 64 IN | HEART RATE: 91 BPM

## 2017-09-01 DIAGNOSIS — R11.0 NAUSEA: Primary | ICD-10-CM

## 2017-09-01 DIAGNOSIS — R06.02 SHORTNESS OF BREATH: ICD-10-CM

## 2017-09-01 DIAGNOSIS — G47.01 INSOMNIA DUE TO MEDICAL CONDITION: ICD-10-CM

## 2017-09-01 DIAGNOSIS — E66.9 OBESITY (BMI 30.0-34.9): ICD-10-CM

## 2017-09-01 PROCEDURE — 94010 BREATHING CAPACITY TEST: CPT | Performed by: NURSE PRACTITIONER

## 2017-09-01 PROCEDURE — 99213 OFFICE O/P EST LOW 20 MIN: CPT | Performed by: NURSE PRACTITIONER

## 2017-09-01 RX ORDER — ONDANSETRON 4 MG/1
4 TABLET, ORALLY DISINTEGRATING ORAL EVERY 8 HOURS PRN
Qty: 30 TABLET | Refills: 0 | Status: SHIPPED | OUTPATIENT
Start: 2017-09-01 | End: 2017-11-10 | Stop reason: SDUPTHER

## 2017-09-01 ASSESSMENT — ENCOUNTER SYMPTOMS
COUGH: 0
BLOOD IN STOOL: 0
WHEEZING: 0
SINUS PRESSURE: 0
SHORTNESS OF BREATH: 0
EYE PAIN: 0
CONSTIPATION: 0
CHEST TIGHTNESS: 0
DIARRHEA: 0
VOICE CHANGE: 0
TROUBLE SWALLOWING: 0
PHOTOPHOBIA: 0
EYE DISCHARGE: 0
VOMITING: 0
NAUSEA: 0
ABDOMINAL PAIN: 0
BACK PAIN: 1
SORE THROAT: 0

## 2017-09-06 ENCOUNTER — TELEPHONE (OUTPATIENT)
Dept: PRIMARY CARE CLINIC | Age: 37
End: 2017-09-06

## 2017-09-06 DIAGNOSIS — F11.21 OPIOID DEPENDENCE IN REMISSION (HCC): ICD-10-CM

## 2017-09-06 RX ORDER — CYCLOBENZAPRINE HCL 10 MG
10 TABLET ORAL 3 TIMES DAILY PRN
Qty: 30 TABLET | Refills: 0 | Status: SHIPPED | OUTPATIENT
Start: 2017-09-06 | End: 2017-09-15 | Stop reason: SDUPTHER

## 2017-09-15 ENCOUNTER — OFFICE VISIT (OUTPATIENT)
Dept: PRIMARY CARE CLINIC | Age: 37
End: 2017-09-15
Payer: COMMERCIAL

## 2017-09-15 VITALS
DIASTOLIC BLOOD PRESSURE: 60 MMHG | HEART RATE: 71 BPM | WEIGHT: 193 LBS | TEMPERATURE: 97.5 F | BODY MASS INDEX: 32.95 KG/M2 | HEIGHT: 64 IN | OXYGEN SATURATION: 96 % | SYSTOLIC BLOOD PRESSURE: 110 MMHG

## 2017-09-15 DIAGNOSIS — G89.29 CHRONIC LOW BACK PAIN, UNSPECIFIED BACK PAIN LATERALITY, WITH SCIATICA PRESENCE UNSPECIFIED: ICD-10-CM

## 2017-09-15 DIAGNOSIS — F33.41 RECURRENT MAJOR DEPRESSIVE DISORDER, IN PARTIAL REMISSION (HCC): ICD-10-CM

## 2017-09-15 DIAGNOSIS — M54.5 CHRONIC LOW BACK PAIN, UNSPECIFIED BACK PAIN LATERALITY, WITH SCIATICA PRESENCE UNSPECIFIED: ICD-10-CM

## 2017-09-15 DIAGNOSIS — F11.21 OPIOID DEPENDENCE IN REMISSION (HCC): ICD-10-CM

## 2017-09-15 DIAGNOSIS — R91.1 PULMONARY NODULE: Primary | ICD-10-CM

## 2017-09-15 DIAGNOSIS — F41.8 SITUATIONAL ANXIETY: ICD-10-CM

## 2017-09-15 DIAGNOSIS — R91.1 LUNG NODULE SEEN ON IMAGING STUDY: ICD-10-CM

## 2017-09-15 PROCEDURE — 99213 OFFICE O/P EST LOW 20 MIN: CPT | Performed by: NURSE PRACTITIONER

## 2017-09-15 RX ORDER — CYCLOBENZAPRINE HCL 10 MG
10 TABLET ORAL 3 TIMES DAILY PRN
Qty: 30 TABLET | Refills: 5 | Status: SHIPPED | OUTPATIENT
Start: 2017-09-15 | End: 2018-03-03 | Stop reason: SDUPTHER

## 2017-09-15 RX ORDER — GABAPENTIN 800 MG/1
800 TABLET ORAL 4 TIMES DAILY
Qty: 120 TABLET | Refills: 3 | Status: SHIPPED | OUTPATIENT
Start: 2017-09-15 | End: 2017-11-10 | Stop reason: SDUPTHER

## 2017-09-15 ASSESSMENT — ENCOUNTER SYMPTOMS
BLOOD IN STOOL: 0
SINUS PRESSURE: 0
SHORTNESS OF BREATH: 0
VOMITING: 0
CHEST TIGHTNESS: 0
BACK PAIN: 1
EYE DISCHARGE: 0
ABDOMINAL PAIN: 0
WHEEZING: 0
PHOTOPHOBIA: 0
TROUBLE SWALLOWING: 0
VOICE CHANGE: 0
NAUSEA: 0
CONSTIPATION: 0
SORE THROAT: 0
EYE PAIN: 0
COUGH: 0
DIARRHEA: 0

## 2017-09-18 RX ORDER — QUETIAPINE FUMARATE 25 MG/1
TABLET, FILM COATED ORAL
Qty: 30 TABLET | Refills: 11 | Status: ON HOLD | OUTPATIENT
Start: 2017-09-18 | End: 2018-09-30

## 2017-09-18 RX ORDER — VENLAFAXINE HYDROCHLORIDE 37.5 MG/1
CAPSULE, EXTENDED RELEASE ORAL
Qty: 30 CAPSULE | Refills: 11 | Status: SHIPPED | OUTPATIENT
Start: 2017-09-18 | End: 2018-06-14 | Stop reason: SDUPTHER

## 2017-09-18 RX ORDER — PANTOPRAZOLE SODIUM 40 MG/1
TABLET, DELAYED RELEASE ORAL
Qty: 30 TABLET | Refills: 11 | Status: SHIPPED | OUTPATIENT
Start: 2017-09-18 | End: 2018-08-10 | Stop reason: SDUPTHER

## 2017-11-10 ENCOUNTER — OFFICE VISIT (OUTPATIENT)
Dept: PRIMARY CARE CLINIC | Age: 37
End: 2017-11-10
Payer: COMMERCIAL

## 2017-11-10 VITALS
OXYGEN SATURATION: 99 % | HEART RATE: 84 BPM | BODY MASS INDEX: 38.23 KG/M2 | HEIGHT: 61 IN | WEIGHT: 202.5 LBS | DIASTOLIC BLOOD PRESSURE: 80 MMHG | TEMPERATURE: 98.1 F | SYSTOLIC BLOOD PRESSURE: 130 MMHG

## 2017-11-10 DIAGNOSIS — R11.0 NAUSEA: ICD-10-CM

## 2017-11-10 DIAGNOSIS — F11.21 OPIOID DEPENDENCE IN REMISSION (HCC): ICD-10-CM

## 2017-11-10 DIAGNOSIS — J01.10 ACUTE NON-RECURRENT FRONTAL SINUSITIS: Primary | ICD-10-CM

## 2017-11-10 DIAGNOSIS — Z98.890 HISTORY OF TRACHEOSTOMY: ICD-10-CM

## 2017-11-10 DIAGNOSIS — Z72.0 TOBACCO ABUSE: ICD-10-CM

## 2017-11-10 DIAGNOSIS — R91.1 LUNG NODULE SEEN ON IMAGING STUDY: ICD-10-CM

## 2017-11-10 DIAGNOSIS — F11.20 PATIENT ON METHADONE MAINTENANCE THERAPY (HCC): ICD-10-CM

## 2017-11-10 DIAGNOSIS — J40 BRONCHITIS: ICD-10-CM

## 2017-11-10 DIAGNOSIS — B95.8 STAPHYLOCOCCAL INFECTION: ICD-10-CM

## 2017-11-10 DIAGNOSIS — G89.29 CHRONIC LOW BACK PAIN, UNSPECIFIED BACK PAIN LATERALITY, WITH SCIATICA PRESENCE UNSPECIFIED: ICD-10-CM

## 2017-11-10 DIAGNOSIS — R91.8 PULMONARY NODULES/LESIONS, MULTIPLE: ICD-10-CM

## 2017-11-10 DIAGNOSIS — M54.5 CHRONIC LOW BACK PAIN, UNSPECIFIED BACK PAIN LATERALITY, WITH SCIATICA PRESENCE UNSPECIFIED: ICD-10-CM

## 2017-11-10 PROCEDURE — G8417 CALC BMI ABV UP PARAM F/U: HCPCS | Performed by: NURSE PRACTITIONER

## 2017-11-10 PROCEDURE — G8427 DOCREV CUR MEDS BY ELIG CLIN: HCPCS | Performed by: NURSE PRACTITIONER

## 2017-11-10 PROCEDURE — 99213 OFFICE O/P EST LOW 20 MIN: CPT | Performed by: NURSE PRACTITIONER

## 2017-11-10 PROCEDURE — 4004F PT TOBACCO SCREEN RCVD TLK: CPT | Performed by: NURSE PRACTITIONER

## 2017-11-10 PROCEDURE — G8484 FLU IMMUNIZE NO ADMIN: HCPCS | Performed by: NURSE PRACTITIONER

## 2017-11-10 RX ORDER — AMOXICILLIN AND CLAVULANATE POTASSIUM 875; 125 MG/1; MG/1
1 TABLET, FILM COATED ORAL 2 TIMES DAILY
Qty: 28 TABLET | Refills: 0 | Status: SHIPPED | OUTPATIENT
Start: 2017-11-10 | End: 2017-12-04 | Stop reason: SDUPTHER

## 2017-11-10 RX ORDER — GABAPENTIN 800 MG/1
800 TABLET ORAL 4 TIMES DAILY
Qty: 120 TABLET | Refills: 3 | Status: SHIPPED | OUTPATIENT
Start: 2017-11-10 | End: 2018-01-06 | Stop reason: SDUPTHER

## 2017-11-10 RX ORDER — ONDANSETRON 4 MG/1
4 TABLET, ORALLY DISINTEGRATING ORAL EVERY 8 HOURS PRN
Qty: 30 TABLET | Refills: 2 | Status: SHIPPED | OUTPATIENT
Start: 2017-11-10 | End: 2017-12-04 | Stop reason: SDUPTHER

## 2017-11-10 ASSESSMENT — ENCOUNTER SYMPTOMS
PHOTOPHOBIA: 0
COUGH: 1
WHEEZING: 0
CHEST TIGHTNESS: 1
DIARRHEA: 0
SINUS PRESSURE: 0
CONSTIPATION: 0
BACK PAIN: 1
NAUSEA: 0
TROUBLE SWALLOWING: 0
EYE PAIN: 0
BLOOD IN STOOL: 0
EYE DISCHARGE: 0
VOMITING: 0
SHORTNESS OF BREATH: 0
ABDOMINAL PAIN: 0
SORE THROAT: 0
VOICE CHANGE: 0

## 2017-11-10 NOTE — PROGRESS NOTES
[address] [address]  [place], [address]  Phone [phone]   Fax [phone]      OFFICE VISIT: 11/10/2017    Vj White- : 1980      Reason For Visit:  Cheri Fernandez is a 44 y.o. female who is here for Congestion (pt has been congested with a cough and fever. Pt needs zofran for nausea and would like an antibiotic for yellow congestion.)         Health Maintenance    HPI        Patient is here with congestion and cough  Has been seeing her dad  Is going to turn off vent first of year  He is on life support    Reports low grade fever  Cough with congestion  Yellow in color  She has history recurrent pneumonia jump on it now    She is also do for   CT chest without contrast for follow up on SARIKA nodule  She is still smoking         height is 5' 1\" (1.549 m) and weight is 202 lb 8 oz (91.9 kg). Her temporal temperature is 98.1 °F (36.7 °C). Her blood pressure is 130/80 and her pulse is 84. Her oxygen saturation is 99%. Body mass index is 38.26 kg/m². Results for orders placed or performed during the hospital encounter of 17   Culture Blood #1   Result Value Ref Range    Blood Culture, Routine No growth after 5 days of incubation. Culture Blood #2   Result Value Ref Range    Organism Staphylococcus coagulase-negative (A)     Culture, Blood 2       No further workup  Isolated from Aerobic bottle  Peripheral draw  Isolated from one bottle only. Susceptibility testing is not  routinely done as these organism frequently represents skin  contamination in blood cultures. If testing is indicated,  please contact Lab within 7 days.      CBC Auto Differential   Result Value Ref Range    WBC 8.8 4.8 - 10.8 K/uL    RBC 3.97 (L) 4.20 - 5.40 M/uL    Hemoglobin 12.2 12.0 - 16.0 g/dL    Hematocrit 38.4 37.0 - 47.0 %    MCV 96.7 81.0 - 99.0 fL    MCH 30.7 27.0 - 31.0 pg    MCHC 31.8 (L) 33.0 - 37.0 g/dL    RDW 12.7 11.5 - 14.5 %    Platelets 267 687 - 388 K/uL    MPV 8.5 (L) 9.4 - 12.3 fL    PLATELET SLIDE REVIEW 1.0 %    Neutrophils # 6.9 1.5 - 7.5 K/uL    Lymphocytes # 1.6 1.1 - 4.5 K/uL    Monocytes # 0.30 0.00 - 0.90 K/uL    Eosinophils # 0.20 0.00 - 0.60 K/uL    Basophils # 0.10 0.00 - 0.20 K/uL   Comprehensive Metabolic Panel   Result Value Ref Range    Sodium 141 136 - 145 mmol/L    Potassium 4.4 3.5 - 5.0 mmol/L    Chloride 104 98 - 111 mmol/L    CO2 22 22 - 29 mmol/L    Anion Gap 15 7 - 19 mmol/L    Glucose 107 74 - 109 mg/dL    BUN 14 6 - 20 mg/dL    CREATININE 0.9 0.5 - 0.9 mg/dL    GFR Non-African American >60 >60    Calcium 8.5 (L) 8.6 - 10.0 mg/dL    Total Protein 6.1 (L) 6.6 - 8.7 g/dL    Alb 3.3 (L) 3.5 - 5.2 g/dL    Total Bilirubin <0.2 0.2 - 1.2 mg/dL    Alkaline Phosphatase 64 35 - 104 U/L    ALT 12 5 - 33 U/L    AST 14 5 - 32 U/L   Magnesium   Result Value Ref Range    Magnesium 2.1 1.6 - 2.6 mg/dL   Phosphorus   Result Value Ref Range    Phosphorus 2.6 2.5 - 4.5 mg/dL   Protime-INR   Result Value Ref Range    Protime 13.4 12.0 - 14.6 sec    INR 1.03 0.88 - 1.18   TSH without Reflex   Result Value Ref Range    TSH 0.95 0.27 - 4.20 uIU/mL   Vitamin B12   Result Value Ref Range    Vitamin B-12 885 211 - 946 pg/mL   RPR   Result Value Ref Range    RPR Non-reactive Non-reactive   Blood Gas, Arterial   Result Value Ref Range    pH, Arterial 7.420 7.350 - 7.450    pCO2, Arterial 44.0 35.0 - 45.0 mmHg    pO2, Arterial 63.0 (L) 80.0 - 100.0 mmHg    HCO3, Arterial 28.5 (H) 22.0 - 26.0 mmol/L    Base Excess, Arterial 3.5 (H) -2.0 - 2.0 mmol/L    Hemoglobin, Art, Extended 11.2 (L) 12.0 - 16.0 g/dL    O2 Sat, Arterial 90.3 >92 %    Carboxyhgb, Arterial 5.2 (H) 0.0 - 5.0 %    Methemoglobin, Arterial 1.1 <1.5 %    O2 Content, Arterial 14.3 Not Established mL/dL    O2 Therapy Unknown    Hemoglobin A1C   Result Value Ref Range    Hemoglobin A1C 5.1 See comment %   Lipid Panel   Result Value Ref Range    Cholesterol, Total 231 (H) 160 - 199 mg/dL    Triglycerides 109 (L) 150 - 199 mg/dL    HDL 32 (L) 65 - 121 mg/dL    LDL  AST 07/04/2017 13     Ethanol Lvl 07/04/2017 <10     Acetaminophen Level 40/70/9967 <97     Salicylate, Serum 57/29/3253 <3.0     P-R Interval 07/10/2017 140     QRS Duration 07/10/2017 86     Q-T Interval 07/10/2017 382     QTc Calculation (Bazett) 07/10/2017 417     P Axis 07/10/2017 55     T Axis 07/10/2017 36     Lactic Acid 07/04/2017 1.3     Lipase 07/04/2017 29     Blood Culture, Routine 07/10/2017 No growth after 5 days of incubation.  Organism 07/07/2017 Staphylococcus coagulase-negative*    Culture, Blood 2 07/07/2017                      Value:No further workup  Isolated from Aerobic bottle  Peripheral draw  Isolated from one bottle only. Susceptibility testing is not  routinely done as these organism frequently represents skin  contamination in blood cultures. If testing is indicated,  please contact Lab within 7 days.       WBC 07/05/2017 9.2     RBC 07/05/2017 3.76*    Hemoglobin 07/05/2017 11.5*    Hematocrit 07/05/2017 36.0*    MCV 07/05/2017 95.7     MCH 07/05/2017 30.6     MCHC 07/05/2017 31.9*    RDW 07/05/2017 12.7     Platelets 81/05/1474 344     MPV 07/05/2017 8.7*    Neutrophils % 07/05/2017 74.4*    Lymphocytes % 07/05/2017 17.6*    Monocytes % 07/05/2017 3.4     Eosinophils % 07/05/2017 1.8     Basophils % 07/05/2017 0.5     Neutrophils # 07/05/2017 6.9     Lymphocytes # 07/05/2017 1.6     Monocytes # 07/05/2017 0.30     Eosinophils # 07/05/2017 0.20     Basophils # 07/05/2017 0.10     Sodium 07/05/2017 141     Potassium 07/05/2017 4.4     Chloride 07/05/2017 104     CO2 07/05/2017 22     Anion Gap 07/05/2017 15     Glucose 07/05/2017 107     BUN 07/05/2017 14     CREATININE 07/05/2017 0.9     GFR Non- 07/05/2017 >60     Calcium 07/05/2017 8.5*    Total Protein 07/05/2017 6.1*    Alb 07/05/2017 3.3*    Total Bilirubin 07/05/2017 <0.2     Alkaline Phosphatase 07/05/2017 64     ALT 07/05/2017 12     AST 07/05/2017 14     Magnesium 07/05/2017 2.1     Phosphorus 07/05/2017 2.6     Protime 07/05/2017 13.4     INR 07/05/2017 1.03     TSH 07/05/2017 0.95     Vitamin B-12 07/05/2017 885     RPR 07/06/2017 Non-reactive     pH, Arterial 07/04/2017 7.420     pCO2, Arterial 07/04/2017 44.0     pO2, Arterial 07/04/2017 63.0*    HCO3, Arterial 07/04/2017 28.5*    Base Excess, Arterial 07/04/2017 3.5*    Hemoglobin, Art, Extended 07/04/2017 11.2*    O2 Sat, Arterial 07/04/2017 90.3     Carboxyhgb, Arterial 07/04/2017 5.2*    Methemoglobin, Arterial 07/04/2017 1.1     O2 Content, Arterial 07/04/2017 14.3     O2 Therapy 07/04/2017 Unknown     Hemoglobin A1C 07/05/2017 5.1     Cholesterol, Total 07/05/2017 231*    Triglycerides 07/05/2017 109*    HDL 07/05/2017 32*    LDL Calculated 07/05/2017 177     Potassium, Whole Blood 07/04/2017 3.9     Troponin 07/05/2017 <0.01     P-R Interval 07/10/2017 146     QRS Duration 07/10/2017 84     Q-T Interval 07/10/2017 402     QTc Calculation (Bazett) 07/10/2017 428     P Axis 07/10/2017 67     T Axis 07/10/2017 41    Admission on 06/27/2017, Discharged on 06/28/2017   Component Date Value    WBC 06/27/2017 14.3*    RBC 06/27/2017 3.89*    Hemoglobin 06/27/2017 12.0     Hematocrit 06/27/2017 37.2     MCV 06/27/2017 95.6     MCH 06/27/2017 30.8     MCHC 06/27/2017 32.3*    RDW 06/27/2017 12.9     Platelets 62/90/9645 220     MPV 06/27/2017 9.2*    PLATELET SLIDE REVIEW 06/27/2017 Adequate     Neutrophils % 06/27/2017 75.0*    Lymphocytes % 06/27/2017 4.0*    Monocytes % 06/27/2017 6.0     Eosinophils % 06/27/2017 1.0     Basophils % 06/27/2017 2.0*    Neutrophils # 06/27/2017 12.3*    Lymphocytes # 06/27/2017 0.7*    Monocytes # 06/27/2017 0.90     Eosinophils # 06/27/2017 0.14     Basophils # 06/27/2017 0.30*    Neutrophils Manual 06/27/2017 75     Bands Relative 06/27/2017 11*    Atypical Lymphocytes Rel* 06/27/2017 1     Basophils Manual 06/27/2017 2 06/28/2017 142     Potassium 06/28/2017 4.1     Chloride 06/28/2017 108     CO2 06/28/2017 24     Anion Gap 06/28/2017 10     Glucose 06/28/2017 167*    BUN 06/28/2017 12     CREATININE 06/28/2017 0.6     GFR Non- 06/28/2017 >60     Calcium 06/28/2017 7.9*    WBC 06/28/2017 13.2*    RBC 06/28/2017 3.50*    Hemoglobin 06/28/2017 11.0*    Hematocrit 06/28/2017 34.6*    MCV 06/28/2017 98.9     MCH 06/28/2017 31.4*    MCHC 06/28/2017 31.8*    RDW 06/28/2017 13.1     Platelets 69/89/8685 244     MPV 06/28/2017 9.3*    PLATELET SLIDE REVIEW 06/28/2017 Adequate     Neutrophils % 06/28/2017 84.0*    Lymphocytes % 06/28/2017 9.0*    Monocytes % 06/28/2017 4.0     Eosinophils % 06/28/2017 0.0     Basophils % 06/28/2017 0.0     Neutrophils # 06/28/2017 11.5*    Lymphocytes # 06/28/2017 1.2     Monocytes # 06/28/2017 0.50     Eosinophils # 06/28/2017 0.00     Basophils # 06/28/2017 0.00     Neutrophils Manual 06/28/2017 84     Bands Relative 06/28/2017 3     Basophils Manual 06/28/2017 0     RBC Morphology 06/28/2017 Normal     CULTURE, RESPIRATORY 06/30/2017 Moderate growth normal respiratory kuldeep with*    Gram Stain Result 06/30/2017                      Value: Moderate Gram positive cocci  in chains  Moderate Gram positive cocci  in clusters  Few Large Gram positive rods present  Moderate Yeast with pseudohyphae present  Many WBC's (Polymorphonuclear) present  Few WBC's (Polymorphonuclear) present      Organism 06/30/2017 Staphylococcus aureus*    CULTURE, RESPIRATORY 06/30/2017 Moderate growth    Office Visit on 05/30/2017   Component Date Value    Strep A Ag 05/30/2017 None Detected      Copies of these are in the chart. Prior to Visit Medications    Medication Sig Taking?  Authorizing Provider   gabapentin (NEURONTIN) 800 MG tablet Take 1 tablet by mouth 4 times daily Yes RONALD Bush   ondansetron (ZOFRAN-ODT) 4 MG disintegrating tablet Take 1 tablet by recall (now 8 yrs)    CYSTOSCOPY  07/25/2013    Dr. Elias Colder  07/25/2013    Dr. Pretty Moreau; partial    LAPAROSCOPY      SALPINGECTOMY  Bilateral    Dr. Pretty Moreau       Social History   Substance Use Topics    Smoking status: Current Every Day Smoker     Packs/day: 0.50     Years: 5.00     Types: Cigarettes    Smokeless tobacco: Never Used    Alcohol use No       Review of Systems   Constitutional: Positive for appetite change and chills. Negative for diaphoresis, fatigue and fever. HENT: Positive for postnasal drip. Negative for congestion, ear discharge, ear pain, sinus pressure, sore throat, trouble swallowing and voice change. Eyes: Negative for photophobia, pain, discharge and visual disturbance. Respiratory: Positive for cough and chest tightness. Negative for shortness of breath and wheezing. Cardiovascular: Negative for chest pain, palpitations and leg swelling. Gastrointestinal: Negative for abdominal pain, blood in stool, constipation, diarrhea, nausea and vomiting. Endocrine: Negative for polydipsia, polyphagia and polyuria. Genitourinary: Negative for difficulty urinating, dysuria, flank pain, frequency and menstrual problem. Musculoskeletal: Positive for back pain (on right side worse to touch good on neurontin). Negative for arthralgias, joint swelling and myalgias. Skin: Negative for rash. Allergic/Immunologic: Negative for immunocompromised state. Neurological: Negative for dizziness, seizures, syncope, weakness, light-headedness, numbness and headaches. Hematological: Negative for adenopathy. Does not bruise/bleed easily. Psychiatric/Behavioral: Positive for sleep disturbance (continues seroquel as needed). Negative for confusion, hallucinations, self-injury and suicidal ideas. The patient is nervous/anxious (better at present). Physical Exam   Constitutional: She appears well-developed. Overweight   HENT:   Head: Normocephalic.    Right Ear: Tympanic membrane and external ear normal.   Left Ear: Tympanic membrane and external ear normal.   Nose: No mucosal edema or rhinorrhea. Right sinus exhibits no maxillary sinus tenderness and no frontal sinus tenderness. Left sinus exhibits no maxillary sinus tenderness and no frontal sinus tenderness. Mouth/Throat: No posterior oropharyngeal erythema. Neck: Normal range of motion. Cardiovascular: Normal rate, regular rhythm and normal heart sounds. No murmur heard. Pulmonary/Chest: Effort normal. No respiratory distress. She has no decreased breath sounds (bases). She has no wheezes. She has no rhonchi. Abdominal: Soft. Bowel sounds are normal.   Musculoskeletal:        Left hip: She exhibits tenderness. Lumbar back: She exhibits tenderness and pain. Generalized pain   Neurological: She is alert. Skin: Skin is dry. Vitals reviewed. ASSESSMENT/ PLAN    1. Acute non-recurrent frontal sinusitis  Plan of care  - amoxicillin-clavulanate (AUGMENTIN) 875-125 MG per tablet; Take 1 tablet by mouth 2 times daily for 14 days  Dispense: 28 tablet; Refill: 0    2. Bronchitis  Increase fluids  - amoxicillin-clavulanate (AUGMENTIN) 875-125 MG per tablet; Take 1 tablet by mouth 2 times daily for 14 days  Dispense: 28 tablet; Refill: 0    3. Lung nodule seen on imaging study  Will set  - CT CHEST WO CONTRAST; Future    4. Opioid dependence in remission (HCC)  Increase fliuds  - gabapentin (NEURONTIN) 800 MG tablet; Take 1 tablet by mouth 4 times daily  Dispense: 120 tablet; Refill: 3    5. Chronic low back pain, unspecified back pain laterality, with sciatica presence unspecified  Plan of care  - gabapentin (NEURONTIN) 800 MG tablet; Take 1 tablet by mouth 4 times daily  Dispense: 120 tablet; Refill: 3    6. Nausea      - ondansetron (ZOFRAN-ODT) 4 MG disintegrating tablet; Take 1 tablet by mouth every 8 hours as needed for Nausea or Vomiting  Dispense: 30 tablet; Refill: 2    7.  Staphylococcal

## 2017-11-10 NOTE — PATIENT INSTRUCTIONS
good.  When should you call for help? Call 911 anytime you think you may need emergency care. For example, call if:  · You have severe trouble breathing. Call your doctor now or seek immediate medical care if:  · You have new or worse trouble breathing. · You cough up dark brown or bloody mucus (sputum). · You have a new or higher fever. · You have a new rash. Watch closely for changes in your health, and be sure to contact your doctor if:  · You cough more deeply or more often, especially if you notice more mucus or a change in the color of your mucus. · You are not getting better as expected. Where can you learn more? Go to [email or web address] org and sign in to your [employer] account. Enter H333 in the [employer] box to learn more about \"Bronchitis: Care Instructions. \"     If you do not have an account, please click on the \"Sign Up Now\" link. Current as of: March 25, 2017  Content Version: 11.3  © 3885-7504 [employer]. Care instructions adapted under license by [facility] ([facility]). If you have questions about a medical condition or this instruction, always ask your healthcare professional. [place] [address] any warranty or liability for your use of this information.

## 2017-11-16 ENCOUNTER — TELEPHONE (OUTPATIENT)
Dept: PRIMARY CARE CLINIC | Age: 37
End: 2017-11-16

## 2017-11-16 DIAGNOSIS — F33.41 RECURRENT MAJOR DEPRESSIVE DISORDER, IN PARTIAL REMISSION (HCC): ICD-10-CM

## 2017-11-16 DIAGNOSIS — F41.8 SITUATIONAL ANXIETY: ICD-10-CM

## 2017-11-16 DIAGNOSIS — G47.09 OTHER INSOMNIA: ICD-10-CM

## 2017-11-16 RX ORDER — QUETIAPINE FUMARATE 50 MG/1
50 TABLET, FILM COATED ORAL NIGHTLY
Qty: 30 TABLET | Refills: 11 | Status: ON HOLD | OUTPATIENT
Start: 2017-11-16 | End: 2018-09-30

## 2017-11-30 ENCOUNTER — HOSPITAL ENCOUNTER (OUTPATIENT)
Dept: GENERAL RADIOLOGY | Age: 37
Discharge: HOME | End: 2017-11-30
Payer: COMMERCIAL

## 2017-11-30 DIAGNOSIS — R91.1 LUNG NODULE SEEN ON IMAGING STUDY: ICD-10-CM

## 2017-11-30 PROCEDURE — 71250 CT THORAX DX C-: CPT

## 2017-12-01 RX ORDER — VENLAFAXINE HYDROCHLORIDE 75 MG/1
CAPSULE, EXTENDED RELEASE ORAL
Qty: 30 CAPSULE | Refills: 11 | Status: SHIPPED | OUTPATIENT
Start: 2017-12-01 | End: 2018-02-01

## 2017-12-01 RX ORDER — METOPROLOL SUCCINATE 50 MG/1
TABLET, EXTENDED RELEASE ORAL
Qty: 30 TABLET | Refills: 11 | Status: SHIPPED | OUTPATIENT
Start: 2017-12-01 | End: 2018-10-05 | Stop reason: SDUPTHER

## 2017-12-04 ENCOUNTER — TELEPHONE (OUTPATIENT)
Dept: PRIMARY CARE CLINIC | Age: 37
End: 2017-12-04

## 2017-12-04 DIAGNOSIS — Z72.0 TOBACCO ABUSE: ICD-10-CM

## 2017-12-04 DIAGNOSIS — R91.8 PULMONARY NODULES/LESIONS, MULTIPLE: ICD-10-CM

## 2017-12-04 DIAGNOSIS — B95.8 STAPHYLOCOCCAL INFECTION: ICD-10-CM

## 2017-12-04 DIAGNOSIS — J01.10 ACUTE NON-RECURRENT FRONTAL SINUSITIS: ICD-10-CM

## 2017-12-04 DIAGNOSIS — Z98.890 HISTORY OF TRACHEOSTOMY: ICD-10-CM

## 2017-12-04 DIAGNOSIS — R11.0 NAUSEA: ICD-10-CM

## 2017-12-04 DIAGNOSIS — F11.20 PATIENT ON METHADONE MAINTENANCE THERAPY (HCC): ICD-10-CM

## 2017-12-04 DIAGNOSIS — J40 BRONCHITIS: ICD-10-CM

## 2017-12-04 NOTE — TELEPHONE ENCOUNTER
She has a bad history of pneumonia  Go ahead and refill augmetin and zofran  And jonnathan thrusday for me to see  If worse come sooner

## 2017-12-04 NOTE — TELEPHONE ENCOUNTER
Scheduled an appointment for Friday with Zaina Lamas, schedule is blocked on Thursday, could not schedule as requested    Requested Prescriptions     Pending Prescriptions Disp Refills    amoxicillin-clavulanate (AUGMENTIN) 875-125 MG per tablet 28 tablet 0     Sig: Take 1 tablet by mouth 2 times daily for 14 days    ondansetron (ZOFRAN-ODT) 4 MG disintegrating tablet 30 tablet 2     Sig: Take 1 tablet by mouth every 8 hours as needed for Nausea or Vomiting

## 2017-12-05 RX ORDER — ONDANSETRON 4 MG/1
4 TABLET, ORALLY DISINTEGRATING ORAL EVERY 8 HOURS PRN
Qty: 30 TABLET | Refills: 2 | Status: SHIPPED | OUTPATIENT
Start: 2017-12-05 | End: 2018-02-27

## 2017-12-05 RX ORDER — AMOXICILLIN AND CLAVULANATE POTASSIUM 875; 125 MG/1; MG/1
1 TABLET, FILM COATED ORAL 2 TIMES DAILY
Qty: 28 TABLET | Refills: 0 | Status: SHIPPED | OUTPATIENT
Start: 2017-12-05 | End: 2018-02-01 | Stop reason: SDUPTHER

## 2017-12-08 ENCOUNTER — OFFICE VISIT (OUTPATIENT)
Dept: PRIMARY CARE CLINIC | Age: 37
End: 2017-12-08
Payer: COMMERCIAL

## 2017-12-08 VITALS
HEIGHT: 61 IN | OXYGEN SATURATION: 96 % | DIASTOLIC BLOOD PRESSURE: 88 MMHG | SYSTOLIC BLOOD PRESSURE: 130 MMHG | WEIGHT: 212 LBS | BODY MASS INDEX: 40.02 KG/M2 | HEART RATE: 74 BPM | TEMPERATURE: 95.5 F

## 2017-12-08 DIAGNOSIS — J44.1 CHRONIC OBSTRUCTIVE PULMONARY DISEASE WITH ACUTE EXACERBATION (HCC): Primary | ICD-10-CM

## 2017-12-08 DIAGNOSIS — R91.1 PULMONARY NODULE: ICD-10-CM

## 2017-12-08 PROCEDURE — 4004F PT TOBACCO SCREEN RCVD TLK: CPT | Performed by: NURSE PRACTITIONER

## 2017-12-08 PROCEDURE — G8427 DOCREV CUR MEDS BY ELIG CLIN: HCPCS | Performed by: NURSE PRACTITIONER

## 2017-12-08 PROCEDURE — 3023F SPIROM DOC REV: CPT | Performed by: NURSE PRACTITIONER

## 2017-12-08 PROCEDURE — G8484 FLU IMMUNIZE NO ADMIN: HCPCS | Performed by: NURSE PRACTITIONER

## 2017-12-08 PROCEDURE — G8926 SPIRO NO PERF OR DOC: HCPCS | Performed by: NURSE PRACTITIONER

## 2017-12-08 PROCEDURE — 99213 OFFICE O/P EST LOW 20 MIN: CPT | Performed by: NURSE PRACTITIONER

## 2017-12-08 PROCEDURE — G8417 CALC BMI ABV UP PARAM F/U: HCPCS | Performed by: NURSE PRACTITIONER

## 2017-12-08 RX ORDER — METHYLPREDNISOLONE 4 MG/1
TABLET ORAL
Qty: 1 KIT | Refills: 0 | Status: SHIPPED | OUTPATIENT
Start: 2017-12-08 | End: 2017-12-14

## 2017-12-08 ASSESSMENT — ENCOUNTER SYMPTOMS
VOICE CHANGE: 0
BACK PAIN: 1
COUGH: 1
CHEST TIGHTNESS: 1
EYE DISCHARGE: 0
NAUSEA: 0
VOMITING: 0
PHOTOPHOBIA: 0
BLOOD IN STOOL: 0
EYE PAIN: 0
WHEEZING: 0
SHORTNESS OF BREATH: 0
CONSTIPATION: 0
SORE THROAT: 0
ABDOMINAL PAIN: 0
TROUBLE SWALLOWING: 0
SINUS PRESSURE: 0
DIARRHEA: 0

## 2017-12-08 NOTE — PROGRESS NOTES
[facility] [address]  [place], [address]  Phone [phone]   Fax [phone]      OFFICE VISIT: 2017    Peyman Mcginnis- : 1980      Reason For Visit:  Ean Ulloa is a 44 y.o. female who is here for Pneumonia (per patient, cough, mucous, vomiting, chest pain, been going on since monday.)         Health Maintenance     HPI        Patient is here with sickness  Called Tuesday had cough and congestion  Fever and achy  She called and started augmentin  Reports she has had some chills   But seems to be improving    Reports productive : green yellow  And will go to [facility]    She has a history of pneumonia  Reports no recent choking           height is 5' 1\" (1.549 m) and weight is 212 lb (96.2 kg). Her temporal temperature is 95.5 °F (35.3 °C). Her blood pressure is 130/88 and her pulse is 74. Her oxygen saturation is 96%. Body mass index is 40.06 kg/m². Results for orders placed or performed during the hospital encounter of 17   Culture Blood #1   Result Value Ref Range    Blood Culture, Routine No growth after 5 days of incubation. Culture Blood #2   Result Value Ref Range    Organism Staphylococcus coagulase-negative (A)     Culture, Blood 2       No further workup  Isolated from Aerobic bottle  Peripheral draw  Isolated from one bottle only. Susceptibility testing is not  routinely done as these organism frequently represents skin  contamination in blood cultures. If testing is indicated,  please contact Lab within 7 days.      CBC Auto Differential   Result Value Ref Range    WBC 8.8 4.8 - 10.8 K/uL    RBC 3.97 (L) 4.20 - 5.40 M/uL    Hemoglobin 12.2 12.0 - 16.0 g/dL    Hematocrit 38.4 37.0 - 47.0 %    MCV 96.7 81.0 - 99.0 fL    MCH 30.7 27.0 - 31.0 pg    MCHC 31.8 (L) 33.0 - 37.0 g/dL    RDW 12.7 11.5 - 14.5 %    Platelets 396 178 - 238 K/uL    MPV 8.5 (L) 9.4 - 12.3 fL    PLATELET SLIDE REVIEW Adequate     Neutrophils % 31.0 (L) 50.0 - 65.0 %    Lymphocytes % 51.0 (H) 20.0 - 40.0 % Monocytes % 5.0 0.0 - 10.0 %    Eosinophils % 7.0 (H) 0.0 - 5.0 %    Basophils % 1.0 0.0 - 1.0 %    Neutrophils # 2.7 1.5 - 7.5 K/uL    Lymphocytes # 4.9 (H) 1.1 - 4.5 K/uL    Monocytes # 0.40 0.00 - 0.90 K/uL    Eosinophils # 0.62 (H) 0.00 - 0.60 K/uL    Basophils # 0.10 0.00 - 0.20 K/uL    Neutrophils Manual 31 %    Atypical Lymphocytes Relative 5 0 - 8 %    Basophils Manual 1 %    RBC Morphology Normal    Comprehensive Metabolic Panel   Result Value Ref Range    Sodium 137 136 - 145 mmol/L    Potassium 3.9 3.5 - 5.0 mmol/L    Chloride 97 (L) 98 - 111 mmol/L    CO2 26 22 - 29 mmol/L    Anion Gap 14 7 - 19 mmol/L    Glucose 80 74 - 109 mg/dL    BUN 16 6 - 20 mg/dL    CREATININE 0.9 0.5 - 0.9 mg/dL    GFR Non-African American >60 >60    Calcium 8.7 8.6 - 10.0 mg/dL    Total Protein 7.2 6.6 - 8.7 g/dL    Alb 3.3 (L) 3.5 - 5.2 g/dL    Total Bilirubin <0.2 0.2 - 1.2 mg/dL    Alkaline Phosphatase 69 35 - 104 U/L    ALT 13 5 - 33 U/L    AST 13 5 - 32 U/L   Ethanol   Result Value Ref Range    Ethanol Lvl <10 mg/dL   Acetaminophen Level   Result Value Ref Range    Acetaminophen Level <54 ug/mL   Salicylate   Result Value Ref Range    Salicylate, Serum <8.7 3.0 - 10.0 mg/dL   Lactic Acid, Plasma   Result Value Ref Range    Lactic Acid 1.3 0.5 - 1.9 mg/dL   Lipase   Result Value Ref Range    Lipase 29 13 - 60 U/L   CBC Auto Differential   Result Value Ref Range    WBC 9.2 4.8 - 10.8 K/uL    RBC 3.76 (L) 4.20 - 5.40 M/uL    Hemoglobin 11.5 (L) 12.0 - 16.0 g/dL    Hematocrit 36.0 (L) 37.0 - 47.0 %    MCV 95.7 81.0 - 99.0 fL    MCH 30.6 27.0 - 31.0 pg    MCHC 31.9 (L) 33.0 - 37.0 g/dL    RDW 12.7 11.5 - 14.5 %    Platelets 574 888 - 890 K/uL    MPV 8.7 (L) 9.4 - 12.3 fL    Neutrophils % 74.4 (H) 50.0 - 65.0 %    Lymphocytes % 17.6 (L) 20.0 - 40.0 %    Monocytes % 3.4 0.0 - 10.0 %    Eosinophils % 1.8 0.0 - 5.0 %    Basophils % 0.5 0.0 - 1.0 %    Neutrophils # 6.9 1.5 - 7.5 K/uL    Lymphocytes # 1.6 1.1 - 4.5 K/uL    Monocytes # 0. 30 0.00 - 0.90 K/uL    Eosinophils # 0.20 0.00 - 0.60 K/uL    Basophils # 0.10 0.00 - 0.20 K/uL   Comprehensive Metabolic Panel   Result Value Ref Range    Sodium 141 136 - 145 mmol/L    Potassium 4.4 3.5 - 5.0 mmol/L    Chloride 104 98 - 111 mmol/L    CO2 22 22 - 29 mmol/L    Anion Gap 15 7 - 19 mmol/L    Glucose 107 74 - 109 mg/dL    BUN 14 6 - 20 mg/dL    CREATININE 0.9 0.5 - 0.9 mg/dL    GFR Non-African American >60 >60    Calcium 8.5 (L) 8.6 - 10.0 mg/dL    Total Protein 6.1 (L) 6.6 - 8.7 g/dL    Alb 3.3 (L) 3.5 - 5.2 g/dL    Total Bilirubin <0.2 0.2 - 1.2 mg/dL    Alkaline Phosphatase 64 35 - 104 U/L    ALT 12 5 - 33 U/L    AST 14 5 - 32 U/L   Magnesium   Result Value Ref Range    Magnesium 2.1 1.6 - 2.6 mg/dL   Phosphorus   Result Value Ref Range    Phosphorus 2.6 2.5 - 4.5 mg/dL   Protime-INR   Result Value Ref Range    Protime 13.4 12.0 - 14.6 sec    INR 1.03 0.88 - 1.18   TSH without Reflex   Result Value Ref Range    TSH 0.95 0.27 - 4.20 uIU/mL   Vitamin B12   Result Value Ref Range    Vitamin B-12 885 211 - 946 pg/mL   RPR   Result Value Ref Range    RPR Non-reactive Non-reactive   Blood Gas, Arterial   Result Value Ref Range    pH, Arterial 7.420 7.350 - 7.450    pCO2, Arterial 44.0 35.0 - 45.0 mmHg    pO2, Arterial 63.0 (L) 80.0 - 100.0 mmHg    HCO3, Arterial 28.5 (H) 22.0 - 26.0 mmol/L    Base Excess, Arterial 3.5 (H) -2.0 - 2.0 mmol/L    Hemoglobin, Art, Extended 11.2 (L) 12.0 - 16.0 g/dL    O2 Sat, Arterial 90.3 >92 %    Carboxyhgb, Arterial 5.2 (H) 0.0 - 5.0 %    Methemoglobin, Arterial 1.1 <1.5 %    O2 Content, Arterial 14.3 Not Established mL/dL    O2 Therapy Unknown    Hemoglobin A1C   Result Value Ref Range    Hemoglobin A1C 5.1 See comment %   Lipid Panel   Result Value Ref Range    Cholesterol, Total 231 (H) 160 - 199 mg/dL    Triglycerides 109 (L) 150 - 199 mg/dL    HDL 32 (L) 65 - 121 mg/dL    LDL Calculated 177 <100 mg/dL   Potassium, Whole Blood   Result Value Ref Range    Potassium, <32     Salicylate, Serum 81/65/4207 <3.0     P-R Interval 07/10/2017 140     QRS Duration 07/10/2017 86     Q-T Interval 07/10/2017 382     QTc Calculation (Bazett) 07/10/2017 417     P Axis 07/10/2017 55     T Axis 07/10/2017 36     Lactic Acid 07/04/2017 1.3     Lipase 07/04/2017 29     Blood Culture, Routine 07/10/2017 No growth after 5 days of incubation.  Organism 07/07/2017 Staphylococcus coagulase-negative*    Culture, Blood 2 07/07/2017                      Value:No further workup  Isolated from Aerobic bottle  Peripheral draw  Isolated from one bottle only. Susceptibility testing is not  routinely done as these organism frequently represents skin  contamination in blood cultures. If testing is indicated,  please contact Lab within 7 days.       WBC 07/05/2017 9.2     RBC 07/05/2017 3.76*    Hemoglobin 07/05/2017 11.5*    Hematocrit 07/05/2017 36.0*    MCV 07/05/2017 95.7     MCH 07/05/2017 30.6     MCHC 07/05/2017 31.9*    RDW 07/05/2017 12.7     Platelets 92/33/4374 344     MPV 07/05/2017 8.7*    Neutrophils % 07/05/2017 74.4*    Lymphocytes % 07/05/2017 17.6*    Monocytes % 07/05/2017 3.4     Eosinophils % 07/05/2017 1.8     Basophils % 07/05/2017 0.5     Neutrophils # 07/05/2017 6.9     Lymphocytes # 07/05/2017 1.6     Monocytes # 07/05/2017 0.30     Eosinophils # 07/05/2017 0.20     Basophils # 07/05/2017 0.10     Sodium 07/05/2017 141     Potassium 07/05/2017 4.4     Chloride 07/05/2017 104     CO2 07/05/2017 22     Anion Gap 07/05/2017 15     Glucose 07/05/2017 107     BUN 07/05/2017 14     CREATININE 07/05/2017 0.9     GFR Non- 07/05/2017 >60     Calcium 07/05/2017 8.5*    Total Protein 07/05/2017 6.1*    Alb 07/05/2017 3.3*    Total Bilirubin 07/05/2017 <0.2     Alkaline Phosphatase 07/05/2017 64     ALT 07/05/2017 12     AST 07/05/2017 14     Magnesium 07/05/2017 2.1     Phosphorus 07/05/2017 2.6     Protime 07/05/2017 13.4     transverse colon, keep previous recall (now 8 yrs)    CYSTOSCOPY  07/25/2013    Dr. Elias Colder  07/25/2013    Dr. Pretty Moreau; partial    LAPAROSCOPY      SALPINGECTOMY  Bilateral    Dr. Pretty Moreau       Social History   Substance Use Topics    Smoking status: Current Every Day Smoker     Packs/day: 0.50     Years: 5.00     Types: Cigarettes    Smokeless tobacco: Never Used    Alcohol use No       Review of Systems   Constitutional: Positive for appetite change and chills. Negative for diaphoresis, fatigue and fever. HENT: Positive for postnasal drip. Negative for congestion, ear discharge, ear pain, sinus pressure, sore throat, trouble swallowing and voice change. Eyes: Negative for photophobia, pain, discharge and visual disturbance. Respiratory: Positive for cough and chest tightness. Negative for shortness of breath and wheezing. Cardiovascular: Negative for chest pain, palpitations and leg swelling. Gastrointestinal: Negative for abdominal pain, blood in stool, constipation, diarrhea, nausea and vomiting. Endocrine: Negative for polydipsia, polyphagia and polyuria. Genitourinary: Negative for difficulty urinating, dysuria, flank pain, frequency and menstrual problem. Musculoskeletal: Positive for back pain (on right side worse to touch good on neurontin). Negative for arthralgias, joint swelling and myalgias. Skin: Negative for rash. Allergic/Immunologic: Negative for immunocompromised state. Neurological: Negative for dizziness, seizures, syncope, weakness, light-headedness, numbness and headaches. Hematological: Negative for adenopathy. Does not bruise/bleed easily. Psychiatric/Behavioral: Positive for sleep disturbance (continues seroquel as needed). Negative for confusion, hallucinations, self-injury and suicidal ideas. The patient is nervous/anxious (better at present). Physical Exam   Constitutional: She appears well-developed.    Overweight   HENT:   Head: Normocephalic. Right Ear: Tympanic membrane and external ear normal.   Left Ear: Tympanic membrane and external ear normal.   Nose: No mucosal edema or rhinorrhea. Right sinus exhibits no maxillary sinus tenderness and no frontal sinus tenderness. Left sinus exhibits no maxillary sinus tenderness and no frontal sinus tenderness. Mouth/Throat: No posterior oropharyngeal erythema. Neck: Normal range of motion. Cardiovascular: Normal rate, regular rhythm and normal heart sounds. No murmur heard. Pulmonary/Chest: Effort normal. No respiratory distress. She has no decreased breath sounds (bases). She has no wheezes. She has no rhonchi. Abdominal: Soft. Bowel sounds are normal.   Musculoskeletal:        Left hip: She exhibits tenderness. Lumbar back: She exhibits tenderness and pain. Generalized pain   Neurological: She is alert. Skin: Skin is dry. Vitals reviewed. ASSESSMENT/ PLAN    1. Chronic obstructive pulmonary disease with acute exacerbation (HCC)  Increase fluids  Supportive care  Cont augmentin  - methylPREDNISolone (MEDROL DOSEPACK) 4 MG tablet; Take by mouth. Dispense: 1 kit; Refill: 0    2. Pulmonary nodule  Recheck in May      No orders of the defined types were placed in this encounter. Return in about 6 weeks (around 1/19/2018) for yearly check up and labs. Patient Instructions     Patient Education        Alveoli in the Lungs: Anatomy Sketch    Current as of: May 12, 2017  Content Version: 11.4  © 2781-2111 [employer]. Care instructions adapted under license by [facility] ([facility]). If you have questions about a medical condition or this instruction, always ask your healthcare professional. [place] [address] any warranty or liability for your use of this information. Controlled Substances Monitoring:                   Additional Instructions: As always, patient is advised to bring in medication bottles in order to correctly

## 2017-12-14 ENCOUNTER — TELEPHONE (OUTPATIENT)
Dept: PRIMARY CARE CLINIC | Age: 37
End: 2017-12-14

## 2018-01-06 DIAGNOSIS — F11.21 OPIOID DEPENDENCE IN REMISSION (HCC): ICD-10-CM

## 2018-01-06 DIAGNOSIS — M54.5 CHRONIC LOW BACK PAIN, UNSPECIFIED BACK PAIN LATERALITY, WITH SCIATICA PRESENCE UNSPECIFIED: ICD-10-CM

## 2018-01-06 DIAGNOSIS — G89.29 CHRONIC LOW BACK PAIN, UNSPECIFIED BACK PAIN LATERALITY, WITH SCIATICA PRESENCE UNSPECIFIED: ICD-10-CM

## 2018-01-08 RX ORDER — GABAPENTIN 800 MG/1
TABLET ORAL
Qty: 120 TABLET | Refills: 0 | Status: SHIPPED | OUTPATIENT
Start: 2018-01-08 | End: 2018-02-01 | Stop reason: SDUPTHER

## 2018-02-01 ENCOUNTER — OFFICE VISIT (OUTPATIENT)
Dept: PRIMARY CARE CLINIC | Age: 38
End: 2018-02-01
Payer: MEDICAID

## 2018-02-01 ENCOUNTER — TELEPHONE (OUTPATIENT)
Dept: PRIMARY CARE CLINIC | Age: 38
End: 2018-02-01

## 2018-02-01 VITALS
SYSTOLIC BLOOD PRESSURE: 120 MMHG | DIASTOLIC BLOOD PRESSURE: 80 MMHG | OXYGEN SATURATION: 97 % | HEIGHT: 61 IN | HEART RATE: 66 BPM | BODY MASS INDEX: 40.17 KG/M2 | WEIGHT: 212.75 LBS | TEMPERATURE: 98 F

## 2018-02-01 DIAGNOSIS — Z98.890 HISTORY OF TRACHEOSTOMY: ICD-10-CM

## 2018-02-01 DIAGNOSIS — J01.10 ACUTE NON-RECURRENT FRONTAL SINUSITIS: ICD-10-CM

## 2018-02-01 DIAGNOSIS — Z00.00 WELL ADULT EXAM: ICD-10-CM

## 2018-02-01 DIAGNOSIS — R91.8 PULMONARY NODULES/LESIONS, MULTIPLE: ICD-10-CM

## 2018-02-01 DIAGNOSIS — F11.21 OPIOID DEPENDENCE IN REMISSION (HCC): ICD-10-CM

## 2018-02-01 DIAGNOSIS — M54.5 CHRONIC LOW BACK PAIN, UNSPECIFIED BACK PAIN LATERALITY, WITH SCIATICA PRESENCE UNSPECIFIED: ICD-10-CM

## 2018-02-01 DIAGNOSIS — Z00.00 WELL ADULT EXAM: Primary | ICD-10-CM

## 2018-02-01 DIAGNOSIS — R91.1 LUNG NODULE SEEN ON IMAGING STUDY: ICD-10-CM

## 2018-02-01 DIAGNOSIS — J40 BRONCHITIS: ICD-10-CM

## 2018-02-01 DIAGNOSIS — Z71.6 TOBACCO ABUSE COUNSELING: ICD-10-CM

## 2018-02-01 DIAGNOSIS — Z72.0 TOBACCO ABUSE: ICD-10-CM

## 2018-02-01 DIAGNOSIS — G89.29 CHRONIC LOW BACK PAIN, UNSPECIFIED BACK PAIN LATERALITY, WITH SCIATICA PRESENCE UNSPECIFIED: ICD-10-CM

## 2018-02-01 DIAGNOSIS — F11.20 PATIENT ON METHADONE MAINTENANCE THERAPY (HCC): ICD-10-CM

## 2018-02-01 LAB
ALBUMIN SERPL-MCNC: 4.6 G/DL (ref 3.5–5.2)
ALP BLD-CCNC: 94 U/L (ref 35–104)
ALT SERPL-CCNC: 21 U/L (ref 5–33)
ANION GAP SERPL CALCULATED.3IONS-SCNC: 21 MMOL/L (ref 7–19)
AST SERPL-CCNC: 23 U/L (ref 5–32)
BASOPHILS ABSOLUTE: 0.1 K/UL (ref 0–0.2)
BASOPHILS RELATIVE PERCENT: 0.8 % (ref 0–1)
BILIRUB SERPL-MCNC: 0.5 MG/DL (ref 0.2–1.2)
BUN BLDV-MCNC: 15 MG/DL (ref 6–20)
CALCIUM SERPL-MCNC: 9.9 MG/DL (ref 8.6–10)
CHLORIDE BLD-SCNC: 99 MMOL/L (ref 98–111)
CHOLESTEROL, TOTAL: 370 MG/DL (ref 160–199)
CO2: 23 MMOL/L (ref 22–29)
CREAT SERPL-MCNC: 0.9 MG/DL (ref 0.5–0.9)
EOSINOPHILS ABSOLUTE: 0.3 K/UL (ref 0–0.6)
EOSINOPHILS RELATIVE PERCENT: 3.2 % (ref 0–5)
GFR NON-AFRICAN AMERICAN: >60
GLUCOSE BLD-MCNC: 73 MG/DL (ref 74–109)
HCT VFR BLD CALC: 46.9 % (ref 37–47)
HDLC SERPL-MCNC: 55 MG/DL (ref 65–121)
HEMOGLOBIN: 14.9 G/DL (ref 12–16)
LDL CHOLESTEROL CALCULATED: 284 MG/DL
LYMPHOCYTES ABSOLUTE: 2.8 K/UL (ref 1.1–4.5)
LYMPHOCYTES RELATIVE PERCENT: 32.7 % (ref 20–40)
MCH RBC QN AUTO: 30.7 PG (ref 27–31)
MCHC RBC AUTO-ENTMCNC: 31.8 G/DL (ref 33–37)
MCV RBC AUTO: 96.5 FL (ref 81–99)
MONOCYTES ABSOLUTE: 0.5 K/UL (ref 0–0.9)
MONOCYTES RELATIVE PERCENT: 5.5 % (ref 0–10)
NEUTROPHILS ABSOLUTE: 5 K/UL (ref 1.5–7.5)
NEUTROPHILS RELATIVE PERCENT: 57.6 % (ref 50–65)
PDW BLD-RTO: 13.5 % (ref 11.5–14.5)
PLATELET # BLD: 340 K/UL (ref 130–400)
PMV BLD AUTO: 9.7 FL (ref 9.4–12.3)
POTASSIUM SERPL-SCNC: 4 MMOL/L (ref 3.5–5)
RAPID HIV 1&2: NORMAL
RBC # BLD: 4.86 M/UL (ref 4.2–5.4)
SODIUM BLD-SCNC: 143 MMOL/L (ref 136–145)
T4 FREE: 1.2 NG/DL (ref 0.9–1.7)
TOTAL PROTEIN: 8.4 G/DL (ref 6.6–8.7)
TRIGL SERPL-MCNC: 153 MG/DL (ref 0–149)
TSH SERPL DL<=0.05 MIU/L-ACNC: 3.73 UIU/ML (ref 0.27–4.2)
WBC # BLD: 8.7 K/UL (ref 4.8–10.8)

## 2018-02-01 PROCEDURE — G8417 CALC BMI ABV UP PARAM F/U: HCPCS | Performed by: NURSE PRACTITIONER

## 2018-02-01 PROCEDURE — 4004F PT TOBACCO SCREEN RCVD TLK: CPT | Performed by: NURSE PRACTITIONER

## 2018-02-01 PROCEDURE — G8427 DOCREV CUR MEDS BY ELIG CLIN: HCPCS | Performed by: NURSE PRACTITIONER

## 2018-02-01 PROCEDURE — 99395 PREV VISIT EST AGE 18-39: CPT | Performed by: NURSE PRACTITIONER

## 2018-02-01 PROCEDURE — G8484 FLU IMMUNIZE NO ADMIN: HCPCS | Performed by: NURSE PRACTITIONER

## 2018-02-01 PROCEDURE — 99406 BEHAV CHNG SMOKING 3-10 MIN: CPT | Performed by: NURSE PRACTITIONER

## 2018-02-01 PROCEDURE — 99213 OFFICE O/P EST LOW 20 MIN: CPT | Performed by: NURSE PRACTITIONER

## 2018-02-01 RX ORDER — AMOXICILLIN AND CLAVULANATE POTASSIUM 875; 125 MG/1; MG/1
1 TABLET, FILM COATED ORAL 2 TIMES DAILY
Qty: 28 TABLET | Refills: 0 | Status: SHIPPED | OUTPATIENT
Start: 2018-02-01 | End: 2018-09-27 | Stop reason: SDUPTHER

## 2018-02-01 RX ORDER — GABAPENTIN 800 MG/1
800 TABLET ORAL 4 TIMES DAILY
Qty: 120 TABLET | Refills: 3 | Status: SHIPPED | OUTPATIENT
Start: 2018-02-01 | End: 2018-04-16 | Stop reason: SDUPTHER

## 2018-02-01 RX ORDER — ATORVASTATIN CALCIUM 20 MG/1
20 TABLET, FILM COATED ORAL DAILY
Qty: 30 TABLET | Refills: 3 | Status: SHIPPED | OUTPATIENT
Start: 2018-02-01 | End: 2018-04-20 | Stop reason: SDUPTHER

## 2018-02-01 ASSESSMENT — ENCOUNTER SYMPTOMS
CHEST TIGHTNESS: 1
BACK PAIN: 1
GASTROINTESTINAL NEGATIVE: 1
DIARRHEA: 0
SORE THROAT: 0
SHORTNESS OF BREATH: 0
EYE PAIN: 0
SINUS PRESSURE: 1
TROUBLE SWALLOWING: 0
ABDOMINAL PAIN: 0
NAUSEA: 0
WHEEZING: 0
VOICE CHANGE: 0
EYE DISCHARGE: 0
COUGH: 1
RESPIRATORY NEGATIVE: 1
VOMITING: 0
PHOTOPHOBIA: 0
CONSTIPATION: 0
SINUS PRESSURE: 0
BLOOD IN STOOL: 0

## 2018-02-01 NOTE — TELEPHONE ENCOUNTER
----- Message from RONALD Victoria sent at 2/1/2018 12:52 PM CST -----  CBC stable no anemia or concerns

## 2018-02-01 NOTE — PROGRESS NOTES
Lymphocytes % 51.0 (H) 20.0 - 40.0 %    Monocytes % 5.0 0.0 - 10.0 %    Eosinophils % 7.0 (H) 0.0 - 5.0 %    Basophils % 1.0 0.0 - 1.0 %    Neutrophils # 2.7 1.5 - 7.5 K/uL    Lymphocytes # 4.9 (H) 1.1 - 4.5 K/uL    Monocytes # 0.40 0.00 - 0.90 K/uL    Eosinophils # 0.62 (H) 0.00 - 0.60 K/uL    Basophils # 0.10 0.00 - 0.20 K/uL    Neutrophils Manual 31 %    Atypical Lymphocytes Relative 5 0 - 8 %    Basophils Manual 1 %    RBC Morphology Normal    Comprehensive Metabolic Panel   Result Value Ref Range    Sodium 137 136 - 145 mmol/L    Potassium 3.9 3.5 - 5.0 mmol/L    Chloride 97 (L) 98 - 111 mmol/L    CO2 26 22 - 29 mmol/L    Anion Gap 14 7 - 19 mmol/L    Glucose 80 74 - 109 mg/dL    BUN 16 6 - 20 mg/dL    CREATININE 0.9 0.5 - 0.9 mg/dL    GFR Non-African American >60 >60    Calcium 8.7 8.6 - 10.0 mg/dL    Total Protein 7.2 6.6 - 8.7 g/dL    Alb 3.3 (L) 3.5 - 5.2 g/dL    Total Bilirubin <0.2 0.2 - 1.2 mg/dL    Alkaline Phosphatase 69 35 - 104 U/L    ALT 13 5 - 33 U/L    AST 13 5 - 32 U/L   Ethanol   Result Value Ref Range    Ethanol Lvl <10 mg/dL   Acetaminophen Level   Result Value Ref Range    Acetaminophen Level <95 ug/mL   Salicylate   Result Value Ref Range    Salicylate, Serum <1.2 3.0 - 10.0 mg/dL   Lactic Acid, Plasma   Result Value Ref Range    Lactic Acid 1.3 0.5 - 1.9 mg/dL   Lipase   Result Value Ref Range    Lipase 29 13 - 60 U/L   CBC Auto Differential   Result Value Ref Range    WBC 9.2 4.8 - 10.8 K/uL    RBC 3.76 (L) 4.20 - 5.40 M/uL    Hemoglobin 11.5 (L) 12.0 - 16.0 g/dL    Hematocrit 36.0 (L) 37.0 - 47.0 %    MCV 95.7 81.0 - 99.0 fL    MCH 30.6 27.0 - 31.0 pg    MCHC 31.9 (L) 33.0 - 37.0 g/dL    RDW 12.7 11.5 - 14.5 %    Platelets 325 733 - 423 K/uL    MPV 8.7 (L) 9.4 - 12.3 fL    Neutrophils % 74.4 (H) 50.0 - 65.0 %    Lymphocytes % 17.6 (L) 20.0 - 40.0 %    Monocytes % 3.4 0.0 - 10.0 %    Eosinophils % 1.8 0.0 - 5.0 %    Basophils % 0.5 0.0 - 1.0 %    Neutrophils # 6.9 1.5 - 7.5 K/uL Lymphocytes # 1.6 1.1 - 4.5 K/uL    Monocytes # 0.30 0.00 - 0.90 K/uL    Eosinophils # 0.20 0.00 - 0.60 K/uL    Basophils # 0.10 0.00 - 0.20 K/uL   Comprehensive Metabolic Panel   Result Value Ref Range    Sodium 141 136 - 145 mmol/L    Potassium 4.4 3.5 - 5.0 mmol/L    Chloride 104 98 - 111 mmol/L    CO2 22 22 - 29 mmol/L    Anion Gap 15 7 - 19 mmol/L    Glucose 107 74 - 109 mg/dL    BUN 14 6 - 20 mg/dL    CREATININE 0.9 0.5 - 0.9 mg/dL    GFR Non-African American >60 >60    Calcium 8.5 (L) 8.6 - 10.0 mg/dL    Total Protein 6.1 (L) 6.6 - 8.7 g/dL    Alb 3.3 (L) 3.5 - 5.2 g/dL    Total Bilirubin <0.2 0.2 - 1.2 mg/dL    Alkaline Phosphatase 64 35 - 104 U/L    ALT 12 5 - 33 U/L    AST 14 5 - 32 U/L   Magnesium   Result Value Ref Range    Magnesium 2.1 1.6 - 2.6 mg/dL   Phosphorus   Result Value Ref Range    Phosphorus 2.6 2.5 - 4.5 mg/dL   Protime-INR   Result Value Ref Range    Protime 13.4 12.0 - 14.6 sec    INR 1.03 0.88 - 1.18   TSH without Reflex   Result Value Ref Range    TSH 0.95 0.27 - 4.20 uIU/mL   Vitamin B12   Result Value Ref Range    Vitamin B-12 885 211 - 946 pg/mL   RPR   Result Value Ref Range    RPR Non-reactive Non-reactive   Blood Gas, Arterial   Result Value Ref Range    pH, Arterial 7.420 7.350 - 7.450    pCO2, Arterial 44.0 35.0 - 45.0 mmHg    pO2, Arterial 63.0 (L) 80.0 - 100.0 mmHg    HCO3, Arterial 28.5 (H) 22.0 - 26.0 mmol/L    Base Excess, Arterial 3.5 (H) -2.0 - 2.0 mmol/L    Hemoglobin, Art, Extended 11.2 (L) 12.0 - 16.0 g/dL    O2 Sat, Arterial 90.3 >92 %    Carboxyhgb, Arterial 5.2 (H) 0.0 - 5.0 %    Methemoglobin, Arterial 1.1 <1.5 %    O2 Content, Arterial 14.3 Not Established mL/dL    O2 Therapy Unknown    Hemoglobin A1C   Result Value Ref Range    Hemoglobin A1C 5.1 See comment %   Lipid Panel   Result Value Ref Range    Cholesterol, Total 231 (H) 160 - 199 mg/dL    Triglycerides 109 (L) 150 - 199 mg/dL    HDL 32 (L) 65 - 121 mg/dL    LDL Calculated 177 <100 mg/dL   Potassium, Whole 07/05/2017 64     ALT 07/05/2017 12     AST 07/05/2017 14     Magnesium 07/05/2017 2.1     Phosphorus 07/05/2017 2.6     Protime 07/05/2017 13.4     INR 07/05/2017 1.03     TSH 07/05/2017 0.95     Vitamin B-12 07/05/2017 885     RPR 07/06/2017 Non-reactive     pH, Arterial 07/04/2017 7.420     pCO2, Arterial 07/04/2017 44.0     pO2, Arterial 07/04/2017 63.0*    HCO3, Arterial 07/04/2017 28.5*    Base Excess, Arterial 07/04/2017 3.5*    Hemoglobin, Art, Extended 07/04/2017 11.2*    O2 Sat, Arterial 07/04/2017 90.3     Carboxyhgb, Arterial 07/04/2017 5.2*    Methemoglobin, Arterial 07/04/2017 1.1     O2 Content, Arterial 07/04/2017 14.3     O2 Therapy 07/04/2017 Unknown     Hemoglobin A1C 07/05/2017 5.1     Cholesterol, Total 07/05/2017 231*    Triglycerides 07/05/2017 109*    HDL 07/05/2017 32*    LDL Calculated 07/05/2017 177     Potassium, Whole Blood 07/04/2017 3.9     Troponin 07/05/2017 <0.01     P-R Interval 07/10/2017 146     QRS Duration 07/10/2017 84     Q-T Interval 07/10/2017 402     QTc Calculation (Bazett) 07/10/2017 428     P Axis 07/10/2017 67     T Axis 07/10/2017 41      Copies of these are in the chart. Prior to Visit Medications    Medication Sig Taking? Authorizing Provider   gabapentin (NEURONTIN) 800 MG tablet Take 1 tablet by mouth 4 times daily for 30 days.  Yes RONALD Velez   amoxicillin-clavulanate (AUGMENTIN) 875-125 MG per tablet Take 1 tablet by mouth 2 times daily for 14 days Yes RONALD Velez   ondansetron (ZOFRAN-ODT) 4 MG disintegrating tablet Take 1 tablet by mouth every 8 hours as needed for Nausea or Vomiting Yes RONALD Velez   metoprolol succinate (TOPROL XL) 50 MG extended release tablet TAKE ONE TABLET BY MOUTH DAILY Yes RONALD Velez   QUEtiapine (SEROQUEL) 50 MG tablet Take 1 tablet by mouth nightly Yes RONALD Velez   venlafaxine (EFFEXOR XR) 37.5 MG extended release capsule TAKE ONE diaphoresis, fatigue and fever. HENT: Positive for postnasal drip. Negative for congestion, ear discharge, ear pain, sinus pressure, sore throat, trouble swallowing and voice change. Eyes: Negative for photophobia, pain, discharge and visual disturbance. Respiratory: Positive for cough and chest tightness. Negative for shortness of breath and wheezing. Cardiovascular: Negative for chest pain, palpitations and leg swelling. Gastrointestinal: Negative for abdominal pain, blood in stool, constipation, diarrhea, nausea and vomiting. Endocrine: Negative for polydipsia, polyphagia and polyuria. Genitourinary: Negative for difficulty urinating, dysuria, flank pain, frequency and menstrual problem. Musculoskeletal: Positive for back pain (on right side worse to touch good on neurontin). Negative for arthralgias, joint swelling and myalgias. Skin: Negative for rash. Allergic/Immunologic: Negative for immunocompromised state. Neurological: Negative for dizziness, seizures, syncope, weakness, light-headedness, numbness and headaches. Hematological: Negative for adenopathy. Does not bruise/bleed easily. Psychiatric/Behavioral: Positive for sleep disturbance (continues seroquel as needed). Negative for confusion, hallucinations, self-injury and suicidal ideas. The patient is not nervous/anxious (better at present). Physical Exam   Constitutional: She appears well-developed. Overweight   HENT:   Head: Normocephalic. Right Ear: Tympanic membrane and external ear normal.   Left Ear: Tympanic membrane and external ear normal.   Nose: No mucosal edema or rhinorrhea. Right sinus exhibits no maxillary sinus tenderness and no frontal sinus tenderness. Left sinus exhibits no maxillary sinus tenderness and no frontal sinus tenderness. Mouth/Throat: Oropharyngeal exudate (thick yellow) present. No posterior oropharyngeal erythema. Neck: Normal range of motion.    Cardiovascular: Normal rate, for good. · Talk to your doctor about whether you have any risk factors for sexually transmitted infections (STIs). Having one sex partner (who does not have STIs and does not have sex with anyone else) is a good way to avoid these infections. · Use birth control if you do not want to have children at this time. Talk with your doctor about the choices available and what might be best for you. · Protect your skin from too much sun. When you're outdoors from 10 a.m. to 4 p.m., stay in the shade or cover up with clothing and a hat with a wide brim. Wear sunglasses that block UV rays. Even when it's cloudy, put broad-spectrum sunscreen (SPF 30 or higher) on any exposed skin. · See a dentist one or two times a year for checkups and to have your teeth cleaned. · Wear a seat belt in the car. · Drink alcohol in moderation, if at all. That means no more than 2 drinks a day for men and 1 drink a day for women. Follow your doctor's advice about when to have certain tests. These tests can spot problems early. For everyone  · Cholesterol. Have the fat (cholesterol) in your blood tested after age 21. Your doctor will tell you how often to have this done based on your age, family history, or other things that can increase your risk for heart disease. · Blood pressure. Have your blood pressure checked during a routine doctor visit. Your doctor will tell you how often to check your blood pressure based on your age, your blood pressure results, and other factors. · Vision. Talk with your doctor about how often to have a glaucoma test.  · Diabetes. Ask your doctor whether you should have tests for diabetes. · Colon cancer. Have a test for colon cancer at age 48. You may have one of several tests. If you are younger than 48, you may need a test earlier if you have any risk factors.  Risk factors include whether you already had a precancerous polyp removed from your colon or whether your parent, brother, sister, or child has

## 2018-02-27 DIAGNOSIS — R11.0 NAUSEA: ICD-10-CM

## 2018-02-27 RX ORDER — ONDANSETRON 4 MG/1
TABLET, ORALLY DISINTEGRATING ORAL
Qty: 30 TABLET | Refills: 0 | Status: SHIPPED | OUTPATIENT
Start: 2018-02-27 | End: 2018-03-26 | Stop reason: SDUPTHER

## 2018-03-03 ENCOUNTER — HOSPITAL ENCOUNTER (EMERGENCY)
Age: 38
Discharge: HOME | End: 2018-03-03
Payer: MEDICAID

## 2018-03-03 VITALS
HEIGHT: 64 IN | OXYGEN SATURATION: 98 % | WEIGHT: 170 LBS | SYSTOLIC BLOOD PRESSURE: 133 MMHG | DIASTOLIC BLOOD PRESSURE: 88 MMHG | HEART RATE: 87 BPM | RESPIRATION RATE: 17 BRPM | TEMPERATURE: 97.5 F | BODY MASS INDEX: 29.02 KG/M2

## 2018-03-03 DIAGNOSIS — M54.31 SCIATICA OF RIGHT SIDE: Primary | ICD-10-CM

## 2018-03-03 PROCEDURE — 99282 EMERGENCY DEPT VISIT SF MDM: CPT

## 2018-03-03 PROCEDURE — 99284 EMERGENCY DEPT VISIT MOD MDM: CPT | Performed by: NURSE PRACTITIONER

## 2018-03-03 PROCEDURE — 96372 THER/PROPH/DIAG INJ SC/IM: CPT

## 2018-03-03 PROCEDURE — 6360000002 HC RX W HCPCS: Performed by: NURSE PRACTITIONER

## 2018-03-03 RX ORDER — PREDNISONE 20 MG/1
20 TABLET ORAL 2 TIMES DAILY
Qty: 10 TABLET | Refills: 0 | Status: SHIPPED | OUTPATIENT
Start: 2018-03-03 | End: 2018-03-08

## 2018-03-03 RX ORDER — MORPHINE SULFATE 4 MG/ML
4 INJECTION, SOLUTION INTRAMUSCULAR; INTRAVENOUS ONCE
Status: COMPLETED | OUTPATIENT
Start: 2018-03-03 | End: 2018-03-03

## 2018-03-03 RX ORDER — CYCLOBENZAPRINE HCL 10 MG
10 TABLET ORAL 3 TIMES DAILY PRN
Qty: 30 TABLET | Refills: 0 | Status: SHIPPED | OUTPATIENT
Start: 2018-03-03 | End: 2018-03-13

## 2018-03-03 RX ORDER — DEXAMETHASONE SODIUM PHOSPHATE 10 MG/ML
8 INJECTION, SOLUTION INTRAMUSCULAR; INTRAVENOUS ONCE
Status: COMPLETED | OUTPATIENT
Start: 2018-03-03 | End: 2018-03-03

## 2018-03-03 RX ORDER — ONDANSETRON 4 MG/1
4 TABLET, ORALLY DISINTEGRATING ORAL ONCE
Status: COMPLETED | OUTPATIENT
Start: 2018-03-03 | End: 2018-03-03

## 2018-03-03 RX ADMIN — ONDANSETRON 4 MG: 4 TABLET, ORALLY DISINTEGRATING ORAL at 22:42

## 2018-03-03 RX ADMIN — DEXAMETHASONE SODIUM PHOSPHATE 8 MG: 10 INJECTION, SOLUTION INTRAMUSCULAR; INTRAVENOUS at 22:42

## 2018-03-03 RX ADMIN — Medication 4 MG: at 22:42

## 2018-03-03 ASSESSMENT — PAIN DESCRIPTION - LOCATION: LOCATION: BACK

## 2018-03-03 ASSESSMENT — PAIN SCALES - GENERAL
PAINLEVEL_OUTOF10: 10
PAINLEVEL_OUTOF10: 9
PAINLEVEL_OUTOF10: 10

## 2018-03-03 ASSESSMENT — PAIN DESCRIPTION - ORIENTATION: ORIENTATION: RIGHT;LOWER

## 2018-03-04 ASSESSMENT — ENCOUNTER SYMPTOMS
BACK PAIN: 1
BOWEL INCONTINENCE: 0
ABDOMINAL PAIN: 0

## 2018-03-04 NOTE — ED PROVIDER NOTES
[facility]  eMERGENCY dEPARTMENT eNCOUnter      Pt Name: Freida Nieves  MRN: [id number]  [place] 1980  Date of evaluation: 3/3/2018  Provider: [address]       Chief Complaint   Patient presents with    Back Pain         HISTORY OF PRESENT ILLNESS   (Location/Symptom, Timing/Onset, Context/Setting, Quality, Duration, Modifying Factors, Severity)  Note limiting factors. Freida Nieves is a 44 y.o. female who presents to the emergency department with low back pain that radiates down her r leg. Has been going on for 4 days. Patient denies any injury. She states it started when she turned to the side. Patient has had back pain previously but not like this. She denies any change in bowel or bladder habits. She denies any fever or  IV drug use. She does take methadone      Back Pain   Location:  Lumbar spine  Quality:  Aching  Radiates to:  R thigh  Pain severity:  Severe  Onset quality:  Sudden  Duration:  4 days  Timing:  Constant  Progression:  Unchanged  Chronicity:  New  Context: twisting    Context: not falling, not physical stress, not recent illness and not recent injury    Associated symptoms: leg pain    Associated symptoms: no abdominal pain, no bladder incontinence, no bowel incontinence, no fever and no perianal numbness    Risk factors: obesity        Nursing Notes were reviewed. REVIEW OF SYSTEMS    (2-9 systems for level 4, 10 or more for level 5)     Review of Systems   Constitutional: Negative for fever. Gastrointestinal: Negative for abdominal pain and bowel incontinence. Genitourinary: Negative for bladder incontinence and difficulty urinating. Musculoskeletal: Positive for back pain and gait problem. Except as noted above the remainder of the review of systems was reviewed and negative.        PAST MEDICAL HISTORY     Past Medical History:   Diagnosis Date    Anxiety     Chronic pain     lower back and right hip    Depression     Endometriosis     GERD (gastroesophageal reflux disease)     Insomnia     Methadone maintenance therapy patient ([facility] [address]     hx of pain med addiction    Ovarian cyst     Overdose          SURGICAL HISTORY       Past Surgical History:   Procedure Laterality Date   Stephen Baumann  2011    Dr. Janine Joaquin in [facility] did surgery and found endometriosis/ Pt states she has polyps on left ovary     COLONOSCOPY  9/2014    Andrei: poor prep, hyperplastic polyp    COLONOSCOPY  2/23/16    Dr Kari Motta; poor prep; normal colonoscopy of the transverse colon, keep previous recall (now 8 yrs)    CYSTOSCOPY  07/25/2013    Dr. Paris Crowley, VAGINAL  07/25/2013    Dr. Bacilio Rice; partial    LAPAROSCOPY      SALPINGECTOMY  Bilateral    Dr. Kavita Rao       Discharge Medication List as of 3/3/2018 11:03 PM      CONTINUE these medications which have NOT CHANGED    Details   ondansetron (ZOFRAN-ODT) 4 MG disintegrating tablet TAKE ONE TABLET BY MOUTH EVERY 8 HOURS AS NEEDED FOR NAUSEA AND VOMITING, Disp-30 tablet, R-0This prescription was filled on 2/27/2018. Any refills authorized will be placed on file. Normal      gabapentin (NEURONTIN) 800 MG tablet Take 1 tablet by mouth 4 times daily for 30 days. , Disp-120 tablet, R-3Normal      atorvastatin (LIPITOR) 20 MG tablet Take 1 tablet by mouth daily, Disp-30 tablet, R-3Normal      metoprolol succinate (TOPROL XL) 50 MG extended release tablet TAKE ONE TABLET BY MOUTH DAILY, Disp-30 tablet, R-11This prescription was filled on 12/1/2017. Any refills authorized will be placed on file. Normal      !! QUEtiapine (SEROQUEL) 50 MG tablet Take 1 tablet by mouth nightly, Disp-30 tablet, R-11Normal      !! venlafaxine (EFFEXOR XR) 37.5 MG extended release capsule TAKE ONE CAPSULE BY MOUTH EVERY DAY, Disp-30 capsule, R-11This prescription was filled on 9/15/2017. Any refills authorized will be placed on file. Normal      !!  QUEtiapine (SEROQUEL) 25 MG

## 2018-03-26 ENCOUNTER — HOSPITAL ENCOUNTER (OUTPATIENT)
Dept: GENERAL RADIOLOGY | Age: 38
Discharge: HOME | End: 2018-03-26
Payer: MEDICAID

## 2018-03-26 ENCOUNTER — OFFICE VISIT (OUTPATIENT)
Dept: PRIMARY CARE CLINIC | Age: 38
End: 2018-03-26
Payer: MEDICAID

## 2018-03-26 VITALS
BODY MASS INDEX: 36.79 KG/M2 | WEIGHT: 215.5 LBS | HEIGHT: 64 IN | TEMPERATURE: 98.4 F | OXYGEN SATURATION: 97 % | HEART RATE: 96 BPM | DIASTOLIC BLOOD PRESSURE: 80 MMHG | SYSTOLIC BLOOD PRESSURE: 118 MMHG

## 2018-03-26 DIAGNOSIS — M54.50 ACUTE RIGHT-SIDED LOW BACK PAIN WITHOUT SCIATICA: ICD-10-CM

## 2018-03-26 DIAGNOSIS — M54.50 ACUTE RIGHT-SIDED LOW BACK PAIN WITHOUT SCIATICA: Primary | ICD-10-CM

## 2018-03-26 DIAGNOSIS — R11.0 NAUSEA: ICD-10-CM

## 2018-03-26 PROCEDURE — 4004F PT TOBACCO SCREEN RCVD TLK: CPT | Performed by: NURSE PRACTITIONER

## 2018-03-26 PROCEDURE — G8417 CALC BMI ABV UP PARAM F/U: HCPCS | Performed by: NURSE PRACTITIONER

## 2018-03-26 PROCEDURE — 72100 X-RAY EXAM L-S SPINE 2/3 VWS: CPT

## 2018-03-26 PROCEDURE — G8427 DOCREV CUR MEDS BY ELIG CLIN: HCPCS | Performed by: NURSE PRACTITIONER

## 2018-03-26 PROCEDURE — G8484 FLU IMMUNIZE NO ADMIN: HCPCS | Performed by: NURSE PRACTITIONER

## 2018-03-26 PROCEDURE — 99213 OFFICE O/P EST LOW 20 MIN: CPT | Performed by: NURSE PRACTITIONER

## 2018-03-26 RX ORDER — ONDANSETRON 4 MG/1
4 TABLET, ORALLY DISINTEGRATING ORAL EVERY 8 HOURS PRN
Qty: 30 TABLET | Refills: 0 | Status: SHIPPED | OUTPATIENT
Start: 2018-03-26 | End: 2018-04-16 | Stop reason: SDUPTHER

## 2018-03-26 RX ORDER — METHYLPREDNISOLONE 4 MG/1
TABLET ORAL
Qty: 1 KIT | Refills: 0 | Status: SHIPPED | OUTPATIENT
Start: 2018-03-26 | End: 2018-04-01

## 2018-03-26 ASSESSMENT — ENCOUNTER SYMPTOMS
ABDOMINAL PAIN: 0
BACK PAIN: 1
COUGH: 0
SORE THROAT: 0
EYES NEGATIVE: 1
TROUBLE SWALLOWING: 0
WHEEZING: 0
SHORTNESS OF BREATH: 0
NAUSEA: 1

## 2018-03-26 NOTE — PATIENT INSTRUCTIONS
Patient Education        Sciatica: Care Instructions  Your Care Instructions    Sciatica (say \"irw-XK-qz-kuh\") is an irritation of one of the sciatic nerves, which come from the spinal cord in the lower back. The sciatic nerves and their branches extend down through the buttock to the foot. Sciatica can develop when an injured disc in the back presses against a spinal nerve root. Its main symptom is pain, numbness, or weakness that is often worse in the leg or foot than in the back. Sciatica often will improve and go away with time. Early treatment usually includes medicines and exercises to relieve pain. Follow-up care is a key part of your treatment and safety. Be sure to make and go to all appointments, and call your doctor if you are having problems. It's also a good idea to know your test results and keep a list of the medicines you take. How can you care for yourself at home? · Take pain medicines exactly as directed. ¨ If the doctor gave you a prescription medicine for pain, take it as prescribed. ¨ If you are not taking a prescription pain medicine, ask your doctor if you can take an over-the-counter medicine. · Use heat or ice to relieve pain. ¨ To apply heat, put a warm water bottle, heating pad set on low, or warm cloth on your back. Do not go to sleep with a heating pad on your skin. ¨ To use ice, put ice or a cold pack on the area for 10 to 20 minutes at a time. Put a thin cloth between the ice and your skin. · Avoid sitting if possible, unless it feels better than standing. · Alternate lying down with short walks. Increase your walking distance as you are able to without making your symptoms worse. · Do not do anything that makes your symptoms worse. When should you call for help? Call 911 anytime you think you may need emergency care. For example, call if:  ? · You are unable to move a leg at all.    ?Call your doctor now or seek immediate medical care if:  ? · You have new or worse need a test like an X-ray or an MRI to diagnose my low back pain. \" Getting a test right away won't help you get better faster. And it could lead you down a treatment path you may not need, since most people get better on their own.      What causes low back pain? In most cases, there isn't a clear cause. This can be frustrating, because your back hurts and there's no obvious reason. Your back pain can be caused by:  Overuse or muscle strain. This can happen from playing sports, lifting heavy things, or not being physically fit. A herniated disc. This is a problem with the cushion between the bones in your back. Arthritis. With age, you may have changes in your bones that can narrow the space around your nerves. Other causes. In rare cases, the cause is a serious illness like an infection or cancer. But there are usually other symptoms too. What are the symptoms? Your symptoms depend on your body and the cause of your back pain. You may feel:  · Pain that's sharp or dull. It may be in one small area or over a broad area. But even bad pain doesn't mean that it's caused by something serious. · Leg pain, numbness, or tingling. When a nerve gets squeezed-such as from a disc problem or arthritis-you may have symptoms in your leg or foot. You can even have leg symptoms from a back problem without having any pain in your back. How is low back pain diagnosed? A physical exam is the main way to diagnose low back pain. Your doctor may examine your back, check your nerves by testing your reflexes, and make sure that your muscles are strong. He or she also will ask questions about your back and overall health. Most people don't need any tests right away. Tests often don't show the reason for your pain. If your pain lasts more than 6 weeks or you have symptoms that your doctor is more concerned about, he or she may order tests. These may include an X-ray, a CT scan, or an MRI.  In some cases, tests can help your if:  · Along with the back pain, you have a fever, lose weight, or don't feel well. · You do not get better as expected. Where can you learn more? Go to https://Ne[employer]ix[employer]MakeLeaps.Mobile [employer]ie. org and sign in to your [employer] account. Enter A007 in the [facility] box to learn more about \"Learning About Low Back Pain. \"     If you do not have an account, please click on the \"Sign Up Now\" link. Current as of: August 17, 2017  Content Version: 11.5  © 4870-1202 Healthwise, Incorporated. Care instructions adapted under license by [facility] ([facility]). If you have questions about a medical condition or this instruction, always ask your healthcare professional. [address] any warranty or liability for your use of this information.

## 2018-03-26 NOTE — PROGRESS NOTES
Z239 in the [facility] box to learn more about \"Sciatica: Care Instructions. \"     If you do not have an account, please click on the \"Sign Up Now\" link. Current as of: March 21, 2017  Content Version: 11.5  © 2876-0022 Healthwise, [employer]. Care instructions adapted under license by [facility] ([facility]). If you have questions about a medical condition or this instruction, always ask your healthcare professional. [address] any warranty or liability for your use of this information. Patient Education        Learning About Low Back Pain  What is low back pain? Low back pain is pain that can occur anywhere below the ribs and above the legs. It is very common. Almost everyone has it at one time or another. Low back pain can be:  Acute. This is new pain that can last a few days to a few weeks-at the most a few months. Chronic. This pain can last for more than a few months. Sometimes it can last for years. What are some myths about low back pain? Here are some common myths about low back pain-and the facts:     Myths and facts about low back pain  Myth Fact   \"I need to rest my back when I have back pain. \" Staying active won't hurt you. It may help you get better faster. \"I need prescription pain medicine. \"  It's best to try to let time and being active heal your back. Opioid pain medicines-such as hydrocodone or oxycodone-usually don't work any better than over-the-counter medicines like ibuprofen or naproxen. And opioids can cause serious problems like addiction or overdose. \"I need a test like an X-ray or an MRI to diagnose my low back pain. \" Getting a test right away won't help you get better faster. And it could lead you down a treatment path you may not need, since most people get better on their own.      What causes low back pain? In most cases, there isn't a clear cause. This can be frustrating, because your back hurts and there's no obvious reason.  Your back pain can be caused by:  Overuse or muscle strain. This can happen from playing sports, lifting heavy things, or not being physically fit. A herniated disc. This is a problem with the cushion between the bones in your back. Arthritis. With age, you may have changes in your bones that can narrow the space around your nerves. Other causes. In rare cases, the cause is a serious illness like an infection or cancer. But there are usually other symptoms too. What are the symptoms? Your symptoms depend on your body and the cause of your back pain. You may feel:  · Pain that's sharp or dull. It may be in one small area or over a broad area. But even bad pain doesn't mean that it's caused by something serious. · Leg pain, numbness, or tingling. When a nerve gets squeezed-such as from a disc problem or arthritis-you may have symptoms in your leg or foot. You can even have leg symptoms from a back problem without having any pain in your back. How is low back pain diagnosed? A physical exam is the main way to diagnose low back pain. Your doctor may examine your back, check your nerves by testing your reflexes, and make sure that your muscles are strong. He or she also will ask questions about your back and overall health. Most people don't need any tests right away. Tests often don't show the reason for your pain. If your pain lasts more than 6 weeks or you have symptoms that your doctor is more concerned about, he or she may order tests. These may include an X-ray, a CT scan, or an MRI. In some cases, tests can help your doctor find a cause for your pain, especially for pain in one or both legs. How is low back pain treated? Most acute low back pain gets better on its own within a few weeks, no matter what the cause. Time and doing usual activities are all that most people need to feel better. Using heat or ice and taking over-the-counter pain medicine also can help while your body heals.   If you

## 2018-03-27 ENCOUNTER — TELEPHONE (OUTPATIENT)
Dept: PRIMARY CARE CLINIC | Age: 38
End: 2018-03-27

## 2018-03-28 NOTE — TELEPHONE ENCOUNTER
Called patient, spoke with: Patient regarding the results of the patients most recent X-ray. I advised Patient of [employer] recommendations.    Patient did voice understanding

## 2018-03-28 NOTE — TELEPHONE ENCOUNTER
----- Message from RONALD Francis sent at 3/26/2018  2:20 PM CDT -----  Xray no acute fracture noted  But slight decrease in spacing L4-L5   If cont will do PT

## 2018-04-16 ENCOUNTER — OFFICE VISIT (OUTPATIENT)
Dept: PRIMARY CARE CLINIC | Age: 38
End: 2018-04-16
Payer: MEDICAID

## 2018-04-16 VITALS
HEART RATE: 85 BPM | HEIGHT: 65 IN | BODY MASS INDEX: 37.4 KG/M2 | TEMPERATURE: 98 F | DIASTOLIC BLOOD PRESSURE: 80 MMHG | OXYGEN SATURATION: 98 % | WEIGHT: 224.5 LBS | SYSTOLIC BLOOD PRESSURE: 120 MMHG

## 2018-04-16 DIAGNOSIS — M54.5 CHRONIC LOW BACK PAIN, UNSPECIFIED BACK PAIN LATERALITY, WITH SCIATICA PRESENCE UNSPECIFIED: ICD-10-CM

## 2018-04-16 DIAGNOSIS — G89.29 CHRONIC BILATERAL LOW BACK PAIN WITH RIGHT-SIDED SCIATICA: Primary | ICD-10-CM

## 2018-04-16 DIAGNOSIS — G89.29 CHRONIC LOW BACK PAIN, UNSPECIFIED BACK PAIN LATERALITY, WITH SCIATICA PRESENCE UNSPECIFIED: ICD-10-CM

## 2018-04-16 DIAGNOSIS — M79.89 BILATERAL SWELLING OF FEET: ICD-10-CM

## 2018-04-16 DIAGNOSIS — M54.41 CHRONIC BILATERAL LOW BACK PAIN WITH RIGHT-SIDED SCIATICA: Primary | ICD-10-CM

## 2018-04-16 DIAGNOSIS — R11.0 NAUSEA: ICD-10-CM

## 2018-04-16 DIAGNOSIS — F11.21 OPIOID DEPENDENCE IN REMISSION (HCC): ICD-10-CM

## 2018-04-16 PROCEDURE — G8417 CALC BMI ABV UP PARAM F/U: HCPCS | Performed by: NURSE PRACTITIONER

## 2018-04-16 PROCEDURE — 99214 OFFICE O/P EST MOD 30 MIN: CPT | Performed by: NURSE PRACTITIONER

## 2018-04-16 PROCEDURE — G8427 DOCREV CUR MEDS BY ELIG CLIN: HCPCS | Performed by: NURSE PRACTITIONER

## 2018-04-16 PROCEDURE — 4004F PT TOBACCO SCREEN RCVD TLK: CPT | Performed by: NURSE PRACTITIONER

## 2018-04-16 RX ORDER — GABAPENTIN 800 MG/1
800 TABLET ORAL 4 TIMES DAILY
Qty: 120 TABLET | Refills: 3 | Status: SHIPPED | OUTPATIENT
Start: 2018-04-16 | End: 2018-05-04 | Stop reason: SDUPTHER

## 2018-04-16 RX ORDER — ONDANSETRON 4 MG/1
4 TABLET, ORALLY DISINTEGRATING ORAL EVERY 8 HOURS PRN
Qty: 30 TABLET | Refills: 0 | Status: SHIPPED | OUTPATIENT
Start: 2018-04-16 | End: 2018-05-21 | Stop reason: SDUPTHER

## 2018-04-16 RX ORDER — MELOXICAM 15 MG/1
15 TABLET ORAL DAILY
Qty: 30 TABLET | Refills: 3 | Status: SHIPPED | OUTPATIENT
Start: 2018-04-16 | End: 2018-06-15 | Stop reason: SDUPTHER

## 2018-04-16 RX ORDER — PREGABALIN 150 MG/1
150 CAPSULE ORAL 2 TIMES DAILY
Qty: 60 CAPSULE | Refills: 3 | Status: SHIPPED | OUTPATIENT
Start: 2018-04-16 | End: 2018-05-04 | Stop reason: SDUPTHER

## 2018-04-16 RX ORDER — FUROSEMIDE 40 MG/1
40 TABLET ORAL DAILY PRN
Qty: 30 TABLET | Refills: 3 | Status: SHIPPED | OUTPATIENT
Start: 2018-04-16 | End: 2018-06-15 | Stop reason: SDUPTHER

## 2018-04-16 RX ORDER — METHYLPREDNISOLONE 4 MG/1
TABLET ORAL
Qty: 1 KIT | Refills: 0 | Status: SHIPPED | OUTPATIENT
Start: 2018-04-16 | End: 2018-04-22

## 2018-04-16 ASSESSMENT — ENCOUNTER SYMPTOMS
COUGH: 0
WHEEZING: 0
EYES NEGATIVE: 1
TROUBLE SWALLOWING: 0
SHORTNESS OF BREATH: 0
NAUSEA: 0
BACK PAIN: 1
SORE THROAT: 0
ABDOMINAL PAIN: 0

## 2018-04-19 ENCOUNTER — OFFICE VISIT (OUTPATIENT)
Dept: PRIMARY CARE CLINIC | Age: 38
End: 2018-04-19
Payer: MEDICAID

## 2018-04-19 VITALS
DIASTOLIC BLOOD PRESSURE: 84 MMHG | BODY MASS INDEX: 36.4 KG/M2 | HEIGHT: 65 IN | TEMPERATURE: 96.9 F | SYSTOLIC BLOOD PRESSURE: 122 MMHG | WEIGHT: 218.5 LBS | OXYGEN SATURATION: 97 % | HEART RATE: 89 BPM

## 2018-04-19 DIAGNOSIS — M54.50 LOW BACK PAIN WITHOUT SCIATICA, UNSPECIFIED BACK PAIN LATERALITY, UNSPECIFIED CHRONICITY: ICD-10-CM

## 2018-04-19 DIAGNOSIS — R73.09 ELEVATED GLUCOSE: Primary | ICD-10-CM

## 2018-04-19 LAB
GLUCOSE BLD-MCNC: 127 MG/DL
HBA1C MFR BLD: 5.3 %

## 2018-04-19 PROCEDURE — G8427 DOCREV CUR MEDS BY ELIG CLIN: HCPCS | Performed by: NURSE PRACTITIONER

## 2018-04-19 PROCEDURE — 82962 GLUCOSE BLOOD TEST: CPT | Performed by: NURSE PRACTITIONER

## 2018-04-19 PROCEDURE — 4004F PT TOBACCO SCREEN RCVD TLK: CPT | Performed by: NURSE PRACTITIONER

## 2018-04-19 PROCEDURE — 99213 OFFICE O/P EST LOW 20 MIN: CPT | Performed by: NURSE PRACTITIONER

## 2018-04-19 PROCEDURE — 83036 HEMOGLOBIN GLYCOSYLATED A1C: CPT | Performed by: NURSE PRACTITIONER

## 2018-04-19 PROCEDURE — G8417 CALC BMI ABV UP PARAM F/U: HCPCS | Performed by: NURSE PRACTITIONER

## 2018-04-19 RX ORDER — TIZANIDINE 4 MG/1
4 TABLET ORAL EVERY 8 HOURS PRN
Qty: 90 TABLET | Refills: 5 | Status: ON HOLD | OUTPATIENT
Start: 2018-04-19 | End: 2018-09-30

## 2018-04-19 ASSESSMENT — PATIENT HEALTH QUESTIONNAIRE - PHQ9
1. LITTLE INTEREST OR PLEASURE IN DOING THINGS: 0
2. FEELING DOWN, DEPRESSED OR HOPELESS: 0
SUM OF ALL RESPONSES TO PHQ9 QUESTIONS 1 & 2: 0
SUM OF ALL RESPONSES TO PHQ QUESTIONS 1-9: 0

## 2018-04-19 ASSESSMENT — ENCOUNTER SYMPTOMS
ABDOMINAL PAIN: 0
COUGH: 0
EYES NEGATIVE: 1
TROUBLE SWALLOWING: 0
SHORTNESS OF BREATH: 0
WHEEZING: 0
BACK PAIN: 1
NAUSEA: 0
SORE THROAT: 0

## 2018-04-20 RX ORDER — ATORVASTATIN CALCIUM 20 MG/1
20 TABLET, FILM COATED ORAL DAILY
Qty: 30 TABLET | Refills: 11 | Status: SHIPPED | OUTPATIENT
Start: 2018-04-20 | End: 2019-01-08

## 2018-05-04 ENCOUNTER — OFFICE VISIT (OUTPATIENT)
Dept: PRIMARY CARE CLINIC | Age: 38
End: 2018-05-04
Payer: MEDICAID

## 2018-05-04 VITALS
DIASTOLIC BLOOD PRESSURE: 78 MMHG | WEIGHT: 223 LBS | SYSTOLIC BLOOD PRESSURE: 128 MMHG | TEMPERATURE: 95.7 F | BODY MASS INDEX: 37.15 KG/M2 | HEIGHT: 65 IN | OXYGEN SATURATION: 93 % | HEART RATE: 69 BPM

## 2018-05-04 DIAGNOSIS — R91.1 PULMONARY NODULE, LEFT: Primary | ICD-10-CM

## 2018-05-04 DIAGNOSIS — F11.20 PATIENT ON METHADONE MAINTENANCE THERAPY (HCC): ICD-10-CM

## 2018-05-04 DIAGNOSIS — M54.5 CHRONIC LOW BACK PAIN, UNSPECIFIED BACK PAIN LATERALITY, WITH SCIATICA PRESENCE UNSPECIFIED: ICD-10-CM

## 2018-05-04 DIAGNOSIS — F11.21 OPIOID DEPENDENCE IN REMISSION (HCC): ICD-10-CM

## 2018-05-04 DIAGNOSIS — G89.29 CHRONIC LOW BACK PAIN, UNSPECIFIED BACK PAIN LATERALITY, WITH SCIATICA PRESENCE UNSPECIFIED: ICD-10-CM

## 2018-05-04 DIAGNOSIS — R91.1 INCIDENTAL PULMONARY NODULE, GREATER THAN OR EQUAL TO 8MM: ICD-10-CM

## 2018-05-04 DIAGNOSIS — G89.29 CHRONIC BILATERAL LOW BACK PAIN WITH RIGHT-SIDED SCIATICA: ICD-10-CM

## 2018-05-04 DIAGNOSIS — M54.41 CHRONIC BILATERAL LOW BACK PAIN WITH RIGHT-SIDED SCIATICA: ICD-10-CM

## 2018-05-04 DIAGNOSIS — Z72.0 TOBACCO ABUSE: ICD-10-CM

## 2018-05-04 PROCEDURE — G8427 DOCREV CUR MEDS BY ELIG CLIN: HCPCS | Performed by: NURSE PRACTITIONER

## 2018-05-04 PROCEDURE — 4004F PT TOBACCO SCREEN RCVD TLK: CPT | Performed by: NURSE PRACTITIONER

## 2018-05-04 PROCEDURE — G8417 CALC BMI ABV UP PARAM F/U: HCPCS | Performed by: NURSE PRACTITIONER

## 2018-05-04 PROCEDURE — 99213 OFFICE O/P EST LOW 20 MIN: CPT | Performed by: NURSE PRACTITIONER

## 2018-05-04 RX ORDER — PREGABALIN 200 MG/1
200 CAPSULE ORAL 3 TIMES DAILY
Qty: 90 CAPSULE | Refills: 3 | Status: SHIPPED | OUTPATIENT
Start: 2018-05-04 | End: 2018-06-14 | Stop reason: ALTCHOICE

## 2018-05-04 RX ORDER — GABAPENTIN 800 MG/1
800 TABLET ORAL 4 TIMES DAILY
Qty: 120 TABLET | Refills: 3 | Status: SHIPPED | OUTPATIENT
Start: 2018-05-04 | End: 2018-06-14

## 2018-05-04 ASSESSMENT — ENCOUNTER SYMPTOMS
COUGH: 0
WHEEZING: 0
BACK PAIN: 1
SHORTNESS OF BREATH: 0
SORE THROAT: 0
NAUSEA: 0
ABDOMINAL PAIN: 0
TROUBLE SWALLOWING: 0
EYES NEGATIVE: 1

## 2018-05-08 ENCOUNTER — TELEPHONE (OUTPATIENT)
Dept: PRIMARY CARE CLINIC | Age: 38
End: 2018-05-08

## 2018-05-15 ENCOUNTER — HOSPITAL ENCOUNTER (OUTPATIENT)
Dept: GENERAL RADIOLOGY | Age: 38
Discharge: HOME | End: 2018-05-15
Payer: MEDICAID

## 2018-05-15 DIAGNOSIS — R91.1 INCIDENTAL PULMONARY NODULE, GREATER THAN OR EQUAL TO 8MM: ICD-10-CM

## 2018-05-15 DIAGNOSIS — Z72.0 TOBACCO ABUSE: ICD-10-CM

## 2018-05-15 DIAGNOSIS — R91.1 PULMONARY NODULE, LEFT: ICD-10-CM

## 2018-05-15 PROCEDURE — 71250 CT THORAX DX C-: CPT

## 2018-05-16 ENCOUNTER — TELEPHONE (OUTPATIENT)
Dept: PRIMARY CARE CLINIC | Age: 38
End: 2018-05-16

## 2018-05-18 ENCOUNTER — TELEPHONE (OUTPATIENT)
Dept: OBGYN | Facility: CLINIC | Age: 38
End: 2018-05-18

## 2018-05-18 DIAGNOSIS — Z12.31 ENCOUNTER FOR SCREENING MAMMOGRAM FOR BREAST CANCER: ICD-10-CM

## 2018-05-18 DIAGNOSIS — N83.202 LEFT OVARIAN CYST: ICD-10-CM

## 2018-05-21 ENCOUNTER — TELEPHONE (OUTPATIENT)
Dept: OBGYN | Facility: MEDICAL CENTER | Age: 38
End: 2018-05-21

## 2018-05-21 ENCOUNTER — TELEPHONE (OUTPATIENT)
Dept: OBGYN | Facility: CLINIC | Age: 38
End: 2018-05-21

## 2018-05-21 ENCOUNTER — TELEPHONE (OUTPATIENT)
Dept: PRIMARY CARE CLINIC | Age: 38
End: 2018-05-21

## 2018-05-21 DIAGNOSIS — Z12.31 ENCOUNTER FOR SCREENING MAMMOGRAM FOR BREAST CANCER: ICD-10-CM

## 2018-05-21 DIAGNOSIS — R11.0 NAUSEA: ICD-10-CM

## 2018-05-21 RX ORDER — ONDANSETRON 4 MG/1
4 TABLET, ORALLY DISINTEGRATING ORAL EVERY 8 HOURS PRN
Qty: 30 TABLET | Refills: 0 | Status: SHIPPED | OUTPATIENT
Start: 2018-05-21 | End: 2018-06-14 | Stop reason: SDUPTHER

## 2018-05-21 NOTE — TELEPHONE ENCOUNTER
Pt would like a new order for her mammogram and pelvic ultrasound that you ordered at her visit with 16. They . Please sign off or let us know if she needs to make an appointment.  Orders are pending please review   Thank you

## 2018-05-21 NOTE — TELEPHONE ENCOUNTER
----- Message from Brunilda Johnson sent at 5/18/2018 10:13 AM PDT -----  Contact: [phone]  Pt didn't have tests that were ordered two years ago - wants to have them done prior to being seen by the doc

## 2018-05-29 DIAGNOSIS — F11.21 OPIOID DEPENDENCE IN REMISSION (HCC): ICD-10-CM

## 2018-05-29 DIAGNOSIS — M54.5 CHRONIC LOW BACK PAIN, UNSPECIFIED BACK PAIN LATERALITY, WITH SCIATICA PRESENCE UNSPECIFIED: ICD-10-CM

## 2018-05-29 DIAGNOSIS — G89.29 CHRONIC LOW BACK PAIN, UNSPECIFIED BACK PAIN LATERALITY, WITH SCIATICA PRESENCE UNSPECIFIED: ICD-10-CM

## 2018-05-29 RX ORDER — GABAPENTIN 800 MG/1
TABLET ORAL
Qty: 120 TABLET | Refills: 1 | Status: SHIPPED | OUTPATIENT
Start: 2018-05-29 | End: 2018-06-14 | Stop reason: SDUPTHER

## 2018-06-14 ENCOUNTER — OFFICE VISIT (OUTPATIENT)
Dept: PRIMARY CARE CLINIC | Age: 38
End: 2018-06-14
Payer: MEDICAID

## 2018-06-14 VITALS
WEIGHT: 219.25 LBS | HEART RATE: 69 BPM | OXYGEN SATURATION: 95 % | DIASTOLIC BLOOD PRESSURE: 78 MMHG | BODY MASS INDEX: 36.53 KG/M2 | TEMPERATURE: 97.6 F | HEIGHT: 65 IN | SYSTOLIC BLOOD PRESSURE: 102 MMHG

## 2018-06-14 DIAGNOSIS — F11.21 OPIOID DEPENDENCE IN REMISSION (HCC): Primary | ICD-10-CM

## 2018-06-14 DIAGNOSIS — F41.8 SITUATIONAL ANXIETY: ICD-10-CM

## 2018-06-14 DIAGNOSIS — F33.41 RECURRENT MAJOR DEPRESSIVE DISORDER, IN PARTIAL REMISSION (HCC): ICD-10-CM

## 2018-06-14 DIAGNOSIS — M54.5 CHRONIC LOW BACK PAIN, UNSPECIFIED BACK PAIN LATERALITY, WITH SCIATICA PRESENCE UNSPECIFIED: ICD-10-CM

## 2018-06-14 DIAGNOSIS — G89.29 CHRONIC LOW BACK PAIN, UNSPECIFIED BACK PAIN LATERALITY, WITH SCIATICA PRESENCE UNSPECIFIED: ICD-10-CM

## 2018-06-14 DIAGNOSIS — R11.0 NAUSEA: ICD-10-CM

## 2018-06-14 PROCEDURE — G8427 DOCREV CUR MEDS BY ELIG CLIN: HCPCS | Performed by: NURSE PRACTITIONER

## 2018-06-14 PROCEDURE — 99213 OFFICE O/P EST LOW 20 MIN: CPT | Performed by: NURSE PRACTITIONER

## 2018-06-14 PROCEDURE — 4004F PT TOBACCO SCREEN RCVD TLK: CPT | Performed by: NURSE PRACTITIONER

## 2018-06-14 PROCEDURE — G8417 CALC BMI ABV UP PARAM F/U: HCPCS | Performed by: NURSE PRACTITIONER

## 2018-06-14 RX ORDER — ONDANSETRON 4 MG/1
4 TABLET, ORALLY DISINTEGRATING ORAL EVERY 8 HOURS PRN
Qty: 30 TABLET | Refills: 0 | Status: ON HOLD | OUTPATIENT
Start: 2018-06-14 | End: 2018-10-04 | Stop reason: HOSPADM

## 2018-06-14 RX ORDER — VENLAFAXINE HYDROCHLORIDE 37.5 MG/1
37.5 CAPSULE, EXTENDED RELEASE ORAL DAILY
Qty: 30 CAPSULE | Refills: 11 | Status: ON HOLD | OUTPATIENT
Start: 2018-06-14 | End: 2019-01-01 | Stop reason: HOSPADM

## 2018-06-14 RX ORDER — GABAPENTIN 800 MG/1
800 TABLET ORAL 4 TIMES DAILY
Qty: 120 TABLET | Refills: 5 | Status: SHIPPED | OUTPATIENT
Start: 2018-06-14 | End: 2018-09-27 | Stop reason: SDUPTHER

## 2018-06-14 RX ORDER — GABAPENTIN 800 MG/1
800 TABLET ORAL 4 TIMES DAILY
Qty: 120 TABLET | Refills: 1 | Status: SHIPPED | OUTPATIENT
Start: 2018-06-14 | End: 2018-06-14 | Stop reason: SDUPTHER

## 2018-06-14 RX ORDER — VENLAFAXINE HYDROCHLORIDE 75 MG/1
75 CAPSULE, EXTENDED RELEASE ORAL DAILY
Qty: 30 CAPSULE | Refills: 5 | Status: SHIPPED | OUTPATIENT
Start: 2018-06-14 | End: 2018-11-23 | Stop reason: SDUPTHER

## 2018-06-14 ASSESSMENT — ENCOUNTER SYMPTOMS
WHEEZING: 0
SORE THROAT: 0
TROUBLE SWALLOWING: 0
BACK PAIN: 1
NAUSEA: 0
COUGH: 0
EYES NEGATIVE: 1
ABDOMINAL PAIN: 0
SHORTNESS OF BREATH: 0

## 2018-06-15 DIAGNOSIS — G89.29 CHRONIC BILATERAL LOW BACK PAIN WITH RIGHT-SIDED SCIATICA: ICD-10-CM

## 2018-06-15 DIAGNOSIS — M54.41 CHRONIC BILATERAL LOW BACK PAIN WITH RIGHT-SIDED SCIATICA: ICD-10-CM

## 2018-06-15 DIAGNOSIS — M79.89 BILATERAL SWELLING OF FEET: ICD-10-CM

## 2018-06-15 RX ORDER — FUROSEMIDE 40 MG/1
TABLET ORAL
Qty: 30 TABLET | Refills: 5 | Status: ON HOLD | OUTPATIENT
Start: 2018-06-15 | End: 2018-09-30

## 2018-06-15 RX ORDER — MELOXICAM 15 MG/1
TABLET ORAL
Qty: 30 TABLET | Refills: 5 | Status: SHIPPED | OUTPATIENT
Start: 2018-06-15 | End: 2018-11-23 | Stop reason: SDUPTHER

## 2018-07-05 ENCOUNTER — TELEPHONE (OUTPATIENT)
Dept: PRIMARY CARE CLINIC | Age: 38
End: 2018-07-05

## 2018-07-05 RX ORDER — ONDANSETRON 4 MG/1
4 TABLET, ORALLY DISINTEGRATING ORAL EVERY 8 HOURS PRN
Qty: 20 TABLET | Refills: 0 | Status: SHIPPED | OUTPATIENT
Start: 2018-07-05 | End: 2018-07-23 | Stop reason: SDUPTHER

## 2018-07-23 RX ORDER — ONDANSETRON 4 MG/1
4 TABLET, ORALLY DISINTEGRATING ORAL EVERY 8 HOURS PRN
Qty: 20 TABLET | Refills: 0 | Status: SHIPPED | OUTPATIENT
Start: 2018-07-23 | End: 2018-09-27 | Stop reason: SDUPTHER

## 2018-08-10 DIAGNOSIS — F33.41 RECURRENT MAJOR DEPRESSIVE DISORDER, IN PARTIAL REMISSION (HCC): ICD-10-CM

## 2018-08-10 DIAGNOSIS — F41.8 SITUATIONAL ANXIETY: ICD-10-CM

## 2018-08-10 RX ORDER — PANTOPRAZOLE SODIUM 40 MG/1
TABLET, DELAYED RELEASE ORAL
Qty: 30 TABLET | Refills: 5 | Status: SHIPPED | OUTPATIENT
Start: 2018-08-10 | End: 2019-01-01 | Stop reason: SDUPTHER

## 2018-08-10 RX ORDER — QUETIAPINE FUMARATE 100 MG/1
TABLET, FILM COATED ORAL
Qty: 45 TABLET | Refills: 5 | Status: ON HOLD | OUTPATIENT
Start: 2018-08-10 | End: 2018-09-29

## 2018-09-27 ENCOUNTER — OFFICE VISIT (OUTPATIENT)
Dept: PRIMARY CARE CLINIC | Age: 38
End: 2018-09-27
Payer: MEDICAID

## 2018-09-27 VITALS
OXYGEN SATURATION: 92 % | TEMPERATURE: 99.2 F | WEIGHT: 205.5 LBS | HEIGHT: 63 IN | HEART RATE: 92 BPM | DIASTOLIC BLOOD PRESSURE: 82 MMHG | SYSTOLIC BLOOD PRESSURE: 118 MMHG | RESPIRATION RATE: 18 BRPM | BODY MASS INDEX: 36.41 KG/M2

## 2018-09-27 DIAGNOSIS — J18.9 PNEUMONIA DUE TO INFECTIOUS ORGANISM, UNSPECIFIED LATERALITY, UNSPECIFIED PART OF LUNG: Primary | ICD-10-CM

## 2018-09-27 DIAGNOSIS — F11.20 PATIENT ON METHADONE MAINTENANCE THERAPY (HCC): ICD-10-CM

## 2018-09-27 DIAGNOSIS — Z98.890 HISTORY OF TRACHEOSTOMY: ICD-10-CM

## 2018-09-27 DIAGNOSIS — G89.29 CHRONIC LOW BACK PAIN, UNSPECIFIED BACK PAIN LATERALITY, WITH SCIATICA PRESENCE UNSPECIFIED: ICD-10-CM

## 2018-09-27 DIAGNOSIS — J01.10 ACUTE NON-RECURRENT FRONTAL SINUSITIS: ICD-10-CM

## 2018-09-27 DIAGNOSIS — Z72.0 TOBACCO ABUSE: ICD-10-CM

## 2018-09-27 DIAGNOSIS — R91.8 PULMONARY NODULES/LESIONS, MULTIPLE: ICD-10-CM

## 2018-09-27 DIAGNOSIS — F11.21 OPIOID DEPENDENCE IN REMISSION (HCC): ICD-10-CM

## 2018-09-27 DIAGNOSIS — J18.9 PNEUMONIA DUE TO INFECTIOUS ORGANISM, UNSPECIFIED LATERALITY, UNSPECIFIED PART OF LUNG: ICD-10-CM

## 2018-09-27 DIAGNOSIS — M54.5 CHRONIC LOW BACK PAIN, UNSPECIFIED BACK PAIN LATERALITY, WITH SCIATICA PRESENCE UNSPECIFIED: ICD-10-CM

## 2018-09-27 LAB
ALBUMIN SERPL-MCNC: 2.9 G/DL (ref 3.5–5.2)
ALP BLD-CCNC: 69 U/L (ref 35–104)
ALT SERPL-CCNC: 31 U/L (ref 5–33)
ANION GAP SERPL CALCULATED.3IONS-SCNC: 11 MMOL/L (ref 7–19)
AST SERPL-CCNC: 41 U/L (ref 5–32)
BASOPHILS ABSOLUTE: 0.1 K/UL (ref 0–0.2)
BASOPHILS RELATIVE PERCENT: 0.7 % (ref 0–1)
BILIRUB SERPL-MCNC: 0.5 MG/DL (ref 0.2–1.2)
BUN BLDV-MCNC: 7 MG/DL (ref 6–20)
CALCIUM SERPL-MCNC: 8.6 MG/DL (ref 8.6–10)
CHLORIDE BLD-SCNC: 95 MMOL/L (ref 98–111)
CO2: 35 MMOL/L (ref 22–29)
CREAT SERPL-MCNC: 0.8 MG/DL (ref 0.5–0.9)
EOSINOPHILS ABSOLUTE: 0.1 K/UL (ref 0–0.6)
EOSINOPHILS RELATIVE PERCENT: 0.6 % (ref 0–5)
GFR NON-AFRICAN AMERICAN: >60
GLUCOSE BLD-MCNC: 96 MG/DL (ref 74–109)
HCT VFR BLD CALC: 37 % (ref 37–47)
HEMOGLOBIN: 11.6 G/DL (ref 12–16)
LYMPHOCYTES ABSOLUTE: 2.6 K/UL (ref 1.1–4.5)
LYMPHOCYTES RELATIVE PERCENT: 21.2 % (ref 20–40)
MCH RBC QN AUTO: 30.4 PG (ref 27–31)
MCHC RBC AUTO-ENTMCNC: 31.4 G/DL (ref 33–37)
MCV RBC AUTO: 96.9 FL (ref 81–99)
MONOCYTES ABSOLUTE: 0.6 K/UL (ref 0–0.9)
MONOCYTES RELATIVE PERCENT: 4.9 % (ref 0–10)
NEUTROPHILS ABSOLUTE: 8.8 K/UL (ref 1.5–7.5)
NEUTROPHILS RELATIVE PERCENT: 72.2 % (ref 50–65)
PDW BLD-RTO: 14.6 % (ref 11.5–14.5)
PLATELET # BLD: 545 K/UL (ref 130–400)
PMV BLD AUTO: 10.1 FL (ref 9.4–12.3)
POTASSIUM SERPL-SCNC: 3 MMOL/L (ref 3.5–5)
RBC # BLD: 3.82 M/UL (ref 4.2–5.4)
SODIUM BLD-SCNC: 141 MMOL/L (ref 136–145)
TOTAL PROTEIN: 6.9 G/DL (ref 6.6–8.7)
WBC # BLD: 12.2 K/UL (ref 4.8–10.8)

## 2018-09-27 PROCEDURE — 4004F PT TOBACCO SCREEN RCVD TLK: CPT | Performed by: NURSE PRACTITIONER

## 2018-09-27 PROCEDURE — 99214 OFFICE O/P EST MOD 30 MIN: CPT | Performed by: NURSE PRACTITIONER

## 2018-09-27 PROCEDURE — G8417 CALC BMI ABV UP PARAM F/U: HCPCS | Performed by: NURSE PRACTITIONER

## 2018-09-27 PROCEDURE — G8427 DOCREV CUR MEDS BY ELIG CLIN: HCPCS | Performed by: NURSE PRACTITIONER

## 2018-09-27 RX ORDER — GABAPENTIN 800 MG/1
800 TABLET ORAL 4 TIMES DAILY
Qty: 120 TABLET | Refills: 5 | Status: SHIPPED | OUTPATIENT
Start: 2018-09-27 | End: 2019-01-01 | Stop reason: SDUPTHER

## 2018-09-27 RX ORDER — AMOXICILLIN AND CLAVULANATE POTASSIUM 875; 125 MG/1; MG/1
1 TABLET, FILM COATED ORAL 2 TIMES DAILY
Qty: 28 TABLET | Refills: 0 | Status: ON HOLD | OUTPATIENT
Start: 2018-09-27 | End: 2018-10-04 | Stop reason: HOSPADM

## 2018-09-27 RX ORDER — ALBUTEROL SULFATE 1.25 MG/3ML
1 SOLUTION RESPIRATORY (INHALATION) EVERY 6 HOURS PRN
Qty: 360 ML | Refills: 3 | Status: SHIPPED | OUTPATIENT
Start: 2018-09-27 | End: 2019-01-01 | Stop reason: SDUPTHER

## 2018-09-27 RX ORDER — ONDANSETRON 4 MG/1
4 TABLET, ORALLY DISINTEGRATING ORAL EVERY 8 HOURS PRN
Qty: 20 TABLET | Refills: 0 | Status: SHIPPED | OUTPATIENT
Start: 2018-09-27 | End: 2018-10-10 | Stop reason: SDUPTHER

## 2018-09-27 ASSESSMENT — ENCOUNTER SYMPTOMS
VOMITING: 0
CONSTIPATION: 0
NAUSEA: 0
ABDOMINAL PAIN: 0
COUGH: 1
CHEST TIGHTNESS: 0
SHORTNESS OF BREATH: 1
WHEEZING: 0
SORE THROAT: 0
DIARRHEA: 0

## 2018-09-27 NOTE — PROGRESS NOTES
Vomiting  RONALD Burton   atorvastatin (LIPITOR) 20 MG tablet Take 1 tablet by mouth daily  RONALD Burton       Allergies: Codeine    Past Medical History:   Diagnosis Date    Anxiety     Chronic pain     lower back and right hip    Depression     Endometriosis     GERD (gastroesophageal reflux disease)     Insomnia     Methadone maintenance therapy patient ([facility] [address]     hx of pain med addiction    Ovarian cyst     Overdose        Past Surgical History:   Procedure Laterality Date   Destini Salane 2011    Dr. Jeanine Anderson in [place] did surgery and found endometriosis/ Pt states she has polyps on left ovary     COLONOSCOPY  9/2014    Andrei: poor prep, hyperplastic polyp    COLONOSCOPY  2/23/16    Dr Dhruv Patton; poor prep; normal colonoscopy of the transverse colon, keep previous recall (now 8 yrs)   Aileen Dawson  07/25/2013    Dr. Syeda Aaron, VAGINAL  07/25/2013    Dr. Dorothea Chatman; partial    LAPAROSCOPY      SALPINGECTOMY  Bilateral    Dr. Dorothea Chatman       Social History   Substance Use Topics    Smoking status: Current Every Day Smoker     Packs/day: 0.50     Years: 5.00     Types: Cigarettes    Smokeless tobacco: Never Used    Alcohol use No       Review of Systems   Constitutional: Positive for activity change, appetite change and fatigue. Negative for fever. HENT: Positive for congestion and postnasal drip. Negative for sore throat. Respiratory: Positive for cough and shortness of breath. Negative for chest tightness and wheezing. Cardiovascular: Negative for chest pain, palpitations and leg swelling. Gastrointestinal: Negative for abdominal pain, constipation, diarrhea, nausea and vomiting. Genitourinary: Negative for difficulty urinating. Musculoskeletal: Positive for arthralgias. Skin: Negative for rash. Psychiatric/Behavioral: Negative for behavioral problems, hallucinations, self-injury and sleep disturbance.  The patient is not nervous/anxious. Physical Exam   Constitutional: She is oriented to person, place, and time. She appears well-developed and well-nourished. No distress. HENT:   Head: Normocephalic. Right Ear: External ear normal.   Left Ear: External ear normal.   Mouth/Throat: No oropharyngeal exudate. Eyes: Right eye exhibits no discharge. Left eye exhibits no discharge. Neck: Normal range of motion. Cardiovascular: Normal rate, regular rhythm, normal heart sounds and intact distal pulses. No murmur heard. Pulmonary/Chest: Effort normal. No respiratory distress. She has no wheezes. She has rales (bases). Abdominal: Soft. Bowel sounds are normal.   Musculoskeletal:        Lumbar back: She exhibits decreased range of motion, tenderness (right SI joint) and pain. Lymphadenopathy:     She has no cervical adenopathy. Neurological: She is alert and oriented to person, place, and time. No cranial nerve deficit. Skin: Skin is warm and dry. No rash noted. She is not diaphoretic. Psychiatric: She has a normal mood and affect. Her behavior is normal.   Vitals reviewed. ASSESSMENT/ PLAN    1. Pneumonia due to infectious organism, unspecified laterality, unspecified part of lung  Increase fluids  gatoraid  Supportive care  Nebulizer four times a day  Will check labs  If worsening to ER  Voiced understanding      - CBC Auto Differential; Future  - Comprehensive Metabolic Panel; Future  - Nebulizers ([address] [place] [address] NEBULIZER) MISC; 1 applicator by Does not apply route every 4 hours as needed (soa)  Dispense: 1 each; Refill: 0  - albuterol (ACCUNEB) 1.25 MG/3ML nebulizer solution; Inhale 3 mLs into the lungs every 6 hours as needed for Wheezing  Dispense: 360 mL; Refill: 3  - amoxicillin-clavulanate (AUGMENTIN) 875-125 MG per tablet; Take 1 tablet by mouth 2 times daily for 14 days  Dispense: 28 tablet; Refill: 0    2.  Opioid dependence in remission [facility])  Will call to fill  Had chantal morales police report  - ondansetron (ZOFRAN ODT) 4 MG disintegrating tablet; Take 1 tablet by mouth every 8 hours as needed for Nausea or Vomiting  Dispense: 20 tablet; Refill: 0  - gabapentin (NEURONTIN) 800 MG tablet; Take 1 tablet by mouth 4 times daily for 30 days. .  Dispense: 120 tablet; Refill: 5    3. Chronic low back pain, unspecified back pain laterality, with sciatica presence unspecified    - gabapentin (NEURONTIN) 800 MG tablet; Take 1 tablet by mouth 4 times daily for 30 days. .  Dispense: 120 tablet; Refill: 5    4. Pulmonary nodules/lesions, multiple    - amoxicillin-clavulanate (AUGMENTIN) 875-125 MG per tablet; Take 1 tablet by mouth 2 times daily for 14 days  Dispense: 28 tablet; Refill: 0    5. History of tracheostomy    - amoxicillin-clavulanate (AUGMENTIN) 875-125 MG per tablet; Take 1 tablet by mouth 2 times daily for 14 days  Dispense: 28 tablet; Refill: 0    6. Patient on methadone maintenance therapy (HCC)    - amoxicillin-clavulanate (AUGMENTIN) 875-125 MG per tablet; Take 1 tablet by mouth 2 times daily for 14 days  Dispense: 28 tablet; Refill: 0    7. Tobacco abuse  Increase fluids  Avoid smoking  - amoxicillin-clavulanate (AUGMENTIN) 875-125 MG per tablet; Take 1 tablet by mouth 2 times daily for 14 days  Dispense: 28 tablet; Refill: 0    8. Acute non-recurrent frontal sinusitis        Orders Placed This Encounter   Procedures    CBC Auto Differential    Comprehensive Metabolic Panel        Return in about 11 days (around 10/8/2018) for breathing follow up. Patient Instructions       Patient Education        Pneumonia: Care Instructions  Your Care Instructions    Pneumonia is an infection of the lungs. Most cases are caused by infections from bacteria or viruses. Pneumonia may be mild or very severe. If it is caused by bacteria, you will be treated with antibiotics.  It may take a few weeks to a few months to recover fully from pneumonia, depending on how sick you were and whether your overall health is good. Follow-up care is a key part of your treatment and safety. Be sure to make and go to all appointments, and call your doctor if you are having problems. It's also a good idea to know your test results and keep a list of the medicines you take. How can you care for yourself at home? · Take your antibiotics exactly as directed. Do not stop taking the medicine just because you are feeling better. You need to take the full course of antibiotics. · Take your medicines exactly as prescribed. Call your doctor if you think you are having a problem with your medicine. · Get plenty of rest and sleep. You may feel weak and tired for a while, but your energy level will improve with time. · To prevent dehydration, drink plenty of fluids, enough so that your urine is light yellow or clear like water. Choose water and other caffeine-free clear liquids until you feel better. If you have kidney, heart, or liver disease and have to limit fluids, talk with your doctor before you increase the amount of fluids you drink. · Take care of your cough so you can rest. A cough that brings up mucus from your lungs is common with pneumonia. It is one way your body gets rid of the infection. But if coughing keeps you from resting or causes severe fatigue and chest-wall pain, talk to your doctor. He or she may suggest that you take a medicine to reduce the cough. · Use a vaporizer or humidifier to add moisture to your bedroom. Follow the directions for cleaning the machine. · Do not smoke or allow others to smoke around you. Smoke will make your cough last longer. If you need help quitting, talk to your doctor about stop-smoking programs and medicines. These can increase your chances of quitting for good. · Take an over-the-counter pain medicine, such as acetaminophen (Tylenol), ibuprofen (Advil, Motrin), or naproxen (Aleve). Read and follow all instructions on the label.   · Do not take two or more pain medicines at the

## 2018-09-27 NOTE — PATIENT INSTRUCTIONS
Patient Education        Pneumonia: Care Instructions  Your Care Instructions    Pneumonia is an infection of the lungs. Most cases are caused by infections from bacteria or viruses. Pneumonia may be mild or very severe. If it is caused by bacteria, you will be treated with antibiotics. It may take a few weeks to a few months to recover fully from pneumonia, depending on how sick you were and whether your overall health is good. Follow-up care is a key part of your treatment and safety. Be sure to make and go to all appointments, and call your doctor if you are having problems. It's also a good idea to know your test results and keep a list of the medicines you take. How can you care for yourself at home? · Take your antibiotics exactly as directed. Do not stop taking the medicine just because you are feeling better. You need to take the full course of antibiotics. · Take your medicines exactly as prescribed. Call your doctor if you think you are having a problem with your medicine. · Get plenty of rest and sleep. You may feel weak and tired for a while, but your energy level will improve with time. · To prevent dehydration, drink plenty of fluids, enough so that your urine is light yellow or clear like water. Choose water and other caffeine-free clear liquids until you feel better. If you have kidney, heart, or liver disease and have to limit fluids, talk with your doctor before you increase the amount of fluids you drink. · Take care of your cough so you can rest. A cough that brings up mucus from your lungs is common with pneumonia. It is one way your body gets rid of the infection. But if coughing keeps you from resting or causes severe fatigue and chest-wall pain, talk to your doctor. He or she may suggest that you take a medicine to reduce the cough. · Use a vaporizer or humidifier to add moisture to your bedroom. Follow the directions for cleaning the machine.   · Do not smoke or allow others to smoke around you. Smoke will make your cough last longer. If you need help quitting, talk to your doctor about stop-smoking programs and medicines. These can increase your chances of quitting for good. · Take an over-the-counter pain medicine, such as acetaminophen (Tylenol), ibuprofen (Advil, Motrin), or naproxen (Aleve). Read and follow all instructions on the label. · Do not take two or more pain medicines at the same time unless the doctor told you to. Many pain medicines have acetaminophen, which is Tylenol. Too much acetaminophen (Tylenol) can be harmful. · If you were given a spirometer to measure how well your lungs are working, use it as instructed. This can help your doctor tell how your recovery is going. · To prevent pneumonia in the future, talk to your doctor about getting a flu vaccine (once a year) and a pneumococcal vaccine (one time only for most people). When should you call for help? Call 911 anytime you think you may need emergency care. For example, call if:    · You have severe trouble breathing.    Call your doctor now or seek immediate medical care if:    · You cough up dark brown or bloody mucus (sputum).     · You have new or worse trouble breathing.     · You are dizzy or lightheaded, or you feel like you may faint.    Watch closely for changes in your health, and be sure to contact your doctor if:    · You have a new or higher fever.     · You are coughing more deeply or more often.     · You are not getting better after 2 days (48 hours).     · You do not get better as expected. Where can you learn more? Go to https://OneGoodLove.compk.[employer]. org and sign in to your Knowledgestreem account. Enter D336 in the [facility] box to learn more about \"Pneumonia: Care Instructions. \"     If you do not have an account, please click on the \"Sign Up Now\" link. Current as of: December 6, 2017  Content Version: 11.7  © 9529-7183 [employer].  Care instructions adapted under license by [facility] ([facility]). If you have questions about a medical condition or this instruction, always ask your healthcare professional. [address] any warranty or liability for your use of this information. Patient Education        Learning About Nebulizers  What is a nebulizer? A nebulizer is a tool that delivers liquid medicine as a fine mist. You breathe in the medicine through a mouthpiece or face mask. This sends the medicine directly to your airways and lungs. You breathe in the medicine for a few minutes. What is it used for? A nebulizer may be used to treat respiratory problems. These include asthma and chronic obstructive pulmonary disease (COPD). If you have asthma, it can be used with daily controller medicines or with quick-relief medicine during an attack or flare-up. A nebulizer can make inhaling medicines easier. It may be very helpful if it is hard for you to breathe or use an inhaler. How do you use a nebulizer? 1. Put the medicine into the medicine container. Be sure to measure the right amount. 2. Make sure that the container is connected to the mouthpiece or face mask. 3. Turn on the air compressor. 4. Take deep, slow breaths through the mouthpiece or mask. Hold each breath for about 2 seconds. 5. Continue breathing until the medicine is gone from the container. When the medicine is gone, there will be no more mist coming out. This may take about 10 minutes. 6. Shake the container to make sure you get all the medicine. Where can you learn more? Go to https://Binpresspk.[employer]. org and sign in to your [employer] account. Enter X455 in the [employer] box to learn more about \"Learning About Nebulizers. \"     If you do not have an account, please click on the \"Sign Up Now\" link. Current as of: December 6, 2017  Content Version: 11.7  © 8246-3218 [employer]. Care instructions adapted under license by [facility] ([facility]).  If you have questions about a medical condition or this instruction, always ask your healthcare professional. [place] [address] any warranty or liability for your use of this information.

## 2018-09-28 ENCOUNTER — TELEPHONE (OUTPATIENT)
Dept: PRIMARY CARE CLINIC | Age: 38
End: 2018-09-28

## 2018-09-28 DIAGNOSIS — E87.5 SERUM POTASSIUM ELEVATED: Primary | ICD-10-CM

## 2018-09-28 RX ORDER — POTASSIUM CHLORIDE 20 MEQ/1
20 TABLET, EXTENDED RELEASE ORAL 2 TIMES DAILY
Qty: 6 TABLET | Refills: 0 | Status: SHIPPED | OUTPATIENT
Start: 2018-09-28 | End: 2019-01-08

## 2018-09-29 ENCOUNTER — HOSPITAL ENCOUNTER (OUTPATIENT)
Age: 38
Setting detail: OBSERVATION
Discharge: PSYCHIATRIC OR SUBSTANCE USE FACILITY | DRG: 885 | End: 2018-10-01
Attending: EMERGENCY MEDICINE | Admitting: INTERNAL MEDICINE
Payer: MEDICAID

## 2018-09-29 DIAGNOSIS — T50.901A ACCIDENTAL DRUG OVERDOSE, INITIAL ENCOUNTER: Primary | ICD-10-CM

## 2018-09-29 PROBLEM — T50.904A OVERDOSE, UNDETERMINED INTENT, INITIAL ENCOUNTER: Status: ACTIVE | Noted: 2018-09-29

## 2018-09-29 LAB
ACETAMINOPHEN LEVEL: <15 UG/ML
ALBUMIN SERPL-MCNC: 2.8 G/DL (ref 3.5–5.2)
ALP BLD-CCNC: 66 U/L (ref 35–104)
ALT SERPL-CCNC: 31 U/L (ref 5–33)
AMPHETAMINE SCREEN, URINE: NEGATIVE
ANION GAP SERPL CALCULATED.3IONS-SCNC: 9 MMOL/L (ref 7–19)
AST SERPL-CCNC: 68 U/L (ref 5–32)
BACTERIA: ABNORMAL /HPF
BARBITURATE SCREEN URINE: NEGATIVE
BASOPHILS ABSOLUTE: 0.1 K/UL (ref 0–0.2)
BASOPHILS RELATIVE PERCENT: 0.4 % (ref 0–1)
BENZODIAZEPINE SCREEN, URINE: NEGATIVE
BILIRUB SERPL-MCNC: 0.3 MG/DL (ref 0.2–1.2)
BILIRUBIN URINE: NEGATIVE
BLOOD, URINE: NEGATIVE
BUN BLDV-MCNC: 8 MG/DL (ref 6–20)
CALCIUM SERPL-MCNC: 8.8 MG/DL (ref 8.6–10)
CANNABINOID SCREEN URINE: NEGATIVE
CASTS: ABNORMAL /LPF
CHLORIDE BLD-SCNC: 95 MMOL/L (ref 98–111)
CLARITY: ABNORMAL
CO2: 36 MMOL/L (ref 22–29)
COCAINE METABOLITE SCREEN URINE: NEGATIVE
COLOR: YELLOW
CREAT SERPL-MCNC: 0.8 MG/DL (ref 0.5–0.9)
EOSINOPHILS ABSOLUTE: 0 K/UL (ref 0–0.6)
EOSINOPHILS RELATIVE PERCENT: 0.2 % (ref 0–5)
EPITHELIAL CELLS, UA: ABNORMAL /HPF
ETHANOL: <10 MG/DL (ref 0–0.08)
GFR NON-AFRICAN AMERICAN: >60
GLUCOSE BLD-MCNC: 121 MG/DL (ref 74–109)
GLUCOSE URINE: NEGATIVE MG/DL
HCG(URINE) PREGNANCY TEST: NEGATIVE
HCT VFR BLD CALC: 32.9 % (ref 37–47)
HEMOGLOBIN: 10.3 G/DL (ref 12–16)
KETONES, URINE: NEGATIVE MG/DL
LEUKOCYTE ESTERASE, URINE: NEGATIVE
LYMPHOCYTES ABSOLUTE: 1.7 K/UL (ref 1.1–4.5)
LYMPHOCYTES RELATIVE PERCENT: 10.1 % (ref 20–40)
Lab: NORMAL
MCH RBC QN AUTO: 30.8 PG (ref 27–31)
MCHC RBC AUTO-ENTMCNC: 31.3 G/DL (ref 33–37)
MCV RBC AUTO: 98.5 FL (ref 81–99)
MONOCYTES ABSOLUTE: 1 K/UL (ref 0–0.9)
MONOCYTES RELATIVE PERCENT: 5.8 % (ref 0–10)
MUCUS: ABNORMAL /LPF
NEUTROPHILS ABSOLUTE: 14.1 K/UL (ref 1.5–7.5)
NEUTROPHILS RELATIVE PERCENT: 82.9 % (ref 50–65)
NITRITE, URINE: NEGATIVE
OPIATE SCREEN URINE: NEGATIVE
PDW BLD-RTO: 14.8 % (ref 11.5–14.5)
PH UA: 6
PLATELET # BLD: 514 K/UL (ref 130–400)
PMV BLD AUTO: 9.8 FL (ref 9.4–12.3)
POTASSIUM SERPL-SCNC: 4.4 MMOL/L (ref 3.5–5)
PROTEIN UA: 100 MG/DL
RBC # BLD: 3.34 M/UL (ref 4.2–5.4)
SODIUM BLD-SCNC: 140 MMOL/L (ref 136–145)
SPECIFIC GRAVITY UA: 1.02
TOTAL PROTEIN: 6.9 G/DL (ref 6.6–8.7)
URINE REFLEX TO CULTURE: ABNORMAL
UROBILINOGEN, URINE: 1 E.U./DL
WBC # BLD: 17 K/UL (ref 4.8–10.8)

## 2018-09-29 PROCEDURE — 80053 COMPREHEN METABOLIC PANEL: CPT

## 2018-09-29 PROCEDURE — G0378 HOSPITAL OBSERVATION PER HR: HCPCS

## 2018-09-29 PROCEDURE — 81025 URINE PREGNANCY TEST: CPT

## 2018-09-29 PROCEDURE — 80307 DRUG TEST PRSMV CHEM ANLYZR: CPT

## 2018-09-29 PROCEDURE — G0480 DRUG TEST DEF 1-7 CLASSES: HCPCS

## 2018-09-29 PROCEDURE — 6370000000 HC RX 637 (ALT 250 FOR IP): Performed by: INTERNAL MEDICINE

## 2018-09-29 PROCEDURE — 87040 BLOOD CULTURE FOR BACTERIA: CPT

## 2018-09-29 PROCEDURE — 99223 1ST HOSP IP/OBS HIGH 75: CPT | Performed by: INTERNAL MEDICINE

## 2018-09-29 PROCEDURE — 85025 COMPLETE CBC W/AUTO DIFF WBC: CPT

## 2018-09-29 PROCEDURE — 6360000002 HC RX W HCPCS: Performed by: INTERNAL MEDICINE

## 2018-09-29 PROCEDURE — 81001 URINALYSIS AUTO W/SCOPE: CPT

## 2018-09-29 PROCEDURE — 99285 EMERGENCY DEPT VISIT HI MDM: CPT

## 2018-09-29 PROCEDURE — 2580000003 HC RX 258: Performed by: INTERNAL MEDICINE

## 2018-09-29 PROCEDURE — 99285 EMERGENCY DEPT VISIT HI MDM: CPT | Performed by: EMERGENCY MEDICINE

## 2018-09-29 PROCEDURE — 96372 THER/PROPH/DIAG INJ SC/IM: CPT

## 2018-09-29 PROCEDURE — 36415 COLL VENOUS BLD VENIPUNCTURE: CPT

## 2018-09-29 PROCEDURE — 96374 THER/PROPH/DIAG INJ IV PUSH: CPT

## 2018-09-29 PROCEDURE — 93005 ELECTROCARDIOGRAM TRACING: CPT

## 2018-09-29 RX ORDER — SODIUM CHLORIDE 0.9 % (FLUSH) 0.9 %
10 SYRINGE (ML) INJECTION PRN
Status: DISCONTINUED | OUTPATIENT
Start: 2018-09-29 | End: 2018-10-01 | Stop reason: HOSPADM

## 2018-09-29 RX ORDER — QUETIAPINE FUMARATE 100 MG/1
100 TABLET, FILM COATED ORAL NIGHTLY
Status: ON HOLD | COMMUNITY
End: 2018-09-30

## 2018-09-29 RX ORDER — VENLAFAXINE HYDROCHLORIDE 75 MG/1
75 CAPSULE, EXTENDED RELEASE ORAL DAILY
Status: DISCONTINUED | OUTPATIENT
Start: 2018-09-29 | End: 2018-10-01 | Stop reason: HOSPADM

## 2018-09-29 RX ORDER — ATORVASTATIN CALCIUM 20 MG/1
20 TABLET, FILM COATED ORAL DAILY
Status: DISCONTINUED | OUTPATIENT
Start: 2018-09-29 | End: 2018-10-01 | Stop reason: HOSPADM

## 2018-09-29 RX ORDER — AMOXICILLIN AND CLAVULANATE POTASSIUM 875; 125 MG/1; MG/1
1 TABLET, FILM COATED ORAL 2 TIMES DAILY
Status: DISCONTINUED | OUTPATIENT
Start: 2018-09-29 | End: 2018-10-01 | Stop reason: HOSPADM

## 2018-09-29 RX ORDER — VENLAFAXINE HYDROCHLORIDE 37.5 MG/1
37.5 CAPSULE, EXTENDED RELEASE ORAL DAILY
Status: DISCONTINUED | OUTPATIENT
Start: 2018-09-29 | End: 2018-10-01 | Stop reason: HOSPADM

## 2018-09-29 RX ORDER — LEVALBUTEROL INHALATION SOLUTION 1.25 MG/3ML
1 SOLUTION RESPIRATORY (INHALATION) 4 TIMES DAILY PRN
Status: DISCONTINUED | OUTPATIENT
Start: 2018-09-29 | End: 2018-10-01 | Stop reason: HOSPADM

## 2018-09-29 RX ORDER — SODIUM CHLORIDE 0.9 % (FLUSH) 0.9 %
10 SYRINGE (ML) INJECTION EVERY 12 HOURS SCHEDULED
Status: DISCONTINUED | OUTPATIENT
Start: 2018-09-29 | End: 2018-10-01 | Stop reason: HOSPADM

## 2018-09-29 RX ORDER — ONDANSETRON 2 MG/ML
4 INJECTION INTRAMUSCULAR; INTRAVENOUS EVERY 6 HOURS PRN
Status: DISCONTINUED | OUTPATIENT
Start: 2018-09-29 | End: 2018-10-01 | Stop reason: HOSPADM

## 2018-09-29 RX ORDER — NICOTINE 21 MG/24HR
1 PATCH, TRANSDERMAL 24 HOURS TRANSDERMAL DAILY
Status: DISCONTINUED | OUTPATIENT
Start: 2018-09-29 | End: 2018-10-01 | Stop reason: HOSPADM

## 2018-09-29 RX ORDER — METHADONE HYDROCHLORIDE 10 MG/1
60 TABLET ORAL DAILY
Status: DISCONTINUED | OUTPATIENT
Start: 2018-09-29 | End: 2018-10-01 | Stop reason: HOSPADM

## 2018-09-29 RX ORDER — ONDANSETRON 4 MG/1
4 TABLET, ORALLY DISINTEGRATING ORAL EVERY 8 HOURS PRN
Status: DISCONTINUED | OUTPATIENT
Start: 2018-09-29 | End: 2018-10-01 | Stop reason: HOSPADM

## 2018-09-29 RX ORDER — METOPROLOL SUCCINATE 50 MG/1
50 TABLET, EXTENDED RELEASE ORAL DAILY
Status: DISCONTINUED | OUTPATIENT
Start: 2018-09-29 | End: 2018-10-01 | Stop reason: HOSPADM

## 2018-09-29 RX ORDER — PANTOPRAZOLE SODIUM 40 MG/1
40 TABLET, DELAYED RELEASE ORAL DAILY
Status: DISCONTINUED | OUTPATIENT
Start: 2018-09-29 | End: 2018-10-01 | Stop reason: HOSPADM

## 2018-09-29 RX ADMIN — VENLAFAXINE HYDROCHLORIDE 37.5 MG: 37.5 CAPSULE, EXTENDED RELEASE ORAL at 08:27

## 2018-09-29 RX ADMIN — ENOXAPARIN SODIUM 40 MG: 40 INJECTION SUBCUTANEOUS at 08:23

## 2018-09-29 RX ADMIN — ONDANSETRON 4 MG: 2 INJECTION INTRAMUSCULAR; INTRAVENOUS at 17:42

## 2018-09-29 RX ADMIN — AMOXICILLIN AND CLAVULANATE POTASSIUM 1 TABLET: 875; 125 TABLET, FILM COATED ORAL at 08:21

## 2018-09-29 RX ADMIN — VENLAFAXINE HYDROCHLORIDE 75 MG: 75 CAPSULE, EXTENDED RELEASE ORAL at 08:22

## 2018-09-29 RX ADMIN — Medication 10 ML: at 09:53

## 2018-09-29 RX ADMIN — PANTOPRAZOLE SODIUM 40 MG: 40 TABLET, DELAYED RELEASE ORAL at 08:21

## 2018-09-29 RX ADMIN — ATORVASTATIN CALCIUM 20 MG: 20 TABLET, FILM COATED ORAL at 08:21

## 2018-09-29 RX ADMIN — AMOXICILLIN AND CLAVULANATE POTASSIUM 1 TABLET: 875; 125 TABLET, FILM COATED ORAL at 20:39

## 2018-09-29 RX ADMIN — METHADONE HYDROCHLORIDE 60 MG: 10 TABLET ORAL at 09:52

## 2018-09-29 ASSESSMENT — PAIN SCALES - GENERAL
PAINLEVEL_OUTOF10: 8
PAINLEVEL_OUTOF10: 8
PAINLEVEL_OUTOF10: 5

## 2018-09-29 ASSESSMENT — ENCOUNTER SYMPTOMS
CONSTIPATION: 0
TROUBLE SWALLOWING: 0
RHINORRHEA: 0
SORE THROAT: 0
ABDOMINAL PAIN: 0
PHOTOPHOBIA: 0
SHORTNESS OF BREATH: 0
CHEST TIGHTNESS: 0
ABDOMINAL DISTENTION: 0
WHEEZING: 0
COUGH: 0
BACK PAIN: 0
DIARRHEA: 0
NAUSEA: 0
VOMITING: 0
EYE PAIN: 0
COLOR CHANGE: 0

## 2018-09-29 NOTE — PROGRESS NOTES
Spoke with Marquis Kevin RN at [facility] regarding the change in dosage of patient's methadone. Marquis Kevin RN states that she spoke with [facility] MD, who agreed to the dosage change. Spoke with Karlee Mcconnell, pharmacist, to verify. Will continue to monitor.

## 2018-09-29 NOTE — H&P
SpO2 96%   24HR INTAKE/OUTPUT:  No intake or output data in the 24 hours ending 09/29/18 0416  General appearance: alert and cooperative with exam  HEENT: atraumatic, eyes with clear conjunctiva and normal lids, pupils and irises normal, external ears and nose are normal, lips normal. Neck without masses, lympadenopathy, bruit, thyroid normal  Lungs: no increased work of breathing, clear to auscultation bilaterally without rales, rhonchi or wheezes  Heart: regular rate and rhythm, S1, S2 normal, no murmur, click, rub or gallop  Abdomen: soft, non-tender; bowel sounds normal; no masses,  no organomegaly  Extremities: extremities normal without clubbing, atraumatic, no cyanosis or edema  Neurologic: No focal neurologic deficits, normal sensation, alert and oriented, affect and mood appropriate. Skin: no rashes, nodules. CBC:   Recent Labs      09/27/18 1436 09/29/18 0043   WBC  12.2*  17.0*   HGB  11.6*  10.3*   PLT  545*  514*     BMP:    Recent Labs      09/27/18 1436 09/29/18 0043   NA  141  140   K  3.0*  4.4   CL  95*  95*   CO2  35*  36*   BUN  7  8   CREATININE  0.8  0.8   GLUCOSE  96  121*     Hepatic:   Recent Labs      09/27/18 1436 09/29/18 0043   AST  41*  68*   ALT  31  31   BILITOT  0.5  0.3   ALKPHOS  69  66     Troponin T: No results for input(s): TROPONINI in the last 72 hours. Pro-BNP: No results for input(s): BNP in the last 72 hours. Lipids: No results for input(s): CHOL, HDL in the last 72 hours. Invalid input(s): LDLCALCU  ABGs: No results for input(s): PHART, FFI9QPG, PO2ART, ZNQ0CDH, BEART, HGBAE, C0SMFJME, CARBOXHGBART, 02THERAPY in the last 72 hours. INR: No results for input(s): INR in the last 72 hours. A1c:Invalid input(s): HEMOGLOBIN A1C      Assessment and Plan   Active Problems:  #  Overdose, undetermined intent, initial encounter  - Patient adamantly denying any intentional overdose  - Will r/o other potential causes.   Blood cxs sent  - Currently holding any sedative medications including methadone, however patient may leave AMA if not prescribed. - will monitor on tele  - Seizure precautions  - Fall precautions    #PNA: POA  - will continue augmentin at this time  - Blood cxs as above    Resolved Problems:    * No resolved hospital problems.  Aakash Trotter MD  Admitting Hospitalist

## 2018-09-29 NOTE — ED PROVIDER NOTES
Neurological: Negative for dizziness, weakness, light-headedness, numbness and headaches. Psychiatric/Behavioral: Negative for agitation, behavioral problems, confusion, hallucinations, self-injury and suicidal ideas.             PAST MEDICAL HISTORY     Past Medical History:   Diagnosis Date    Anxiety     Chronic pain     lower back and right hip    Depression     Endometriosis     GERD (gastroesophageal reflux disease)     Insomnia     Methadone maintenance therapy patient ([address]     hx of pain med addiction    Ovarian cyst     Overdose          SURGICAL HISTORY       Past Surgical History:   Procedure Laterality Date   Sanjay Brush  2011    Dr. Clark President in [place] did surgery and found endometriosis/ Pt states she has polyps on left ovary     COLONOSCOPY  9/2014    Andrei: poor prep, hyperplastic polyp    COLONOSCOPY  2/23/16    Dr Kera Pickering; poor prep; normal colonoscopy of the transverse colon, keep previous recall (now 8 yrs)    CYSTOSCOPY  07/25/2013    Dr. Kaity Chaves, VAGINAL  07/25/2013    Dr. Linnette Andres; partial   Royden Gurney SALPINGECTOMY  Bilateral    Dr. Shonda Hollis       Current Discharge Medication List      CONTINUE these medications which have NOT CHANGED    Details   potassium chloride (KLOR-CON M) 20 MEQ extended release tablet Take 1 tablet by mouth 2 times daily for 3 days  Qty: 6 tablet, Refills: 0      Nebulizers ([address] [place] [address] PED NEBULIZER) MISC 1 applicator by Does not apply route every 4 hours as needed (soa)  Qty: 1 each, Refills: 0    Associated Diagnoses: Pneumonia due to infectious organism, unspecified laterality, unspecified part of lung      !! ondansetron (ZOFRAN ODT) 4 MG disintegrating tablet Take 1 tablet by mouth every 8 hours as needed for Nausea or Vomiting  Qty: 20 tablet, Refills: 0    Associated Diagnoses: Opioid dependence in remission (HCC)      gabapentin (NEURONTIN) 800 MG tablet Take 1 tablet by reveals no gallop. No murmur heard. Pulmonary/Chest: Effort normal and breath sounds normal. She has no wheezes. She has no rales. Abdominal: Soft. Bowel sounds are normal. She exhibits no distension. There is no tenderness. There is no rebound and no guarding. Musculoskeletal: Normal range of motion. She exhibits no edema. Neurological: She is alert and oriented to person, place, and time. No cranial nerve deficit. Skin: Skin is warm and dry. No rash noted. Psychiatric: She has a normal mood and affect. Judgment and thought content normal. Her speech is delayed. She is slowed. Cognition and memory are normal. She expresses no suicidal ideation. She expresses no suicidal plans. Nursing note and vitals reviewed. DIAGNOSTIC RESULTS     EKG: All EKG's are interpreted by the Emergency Department Physician who either signs or Co-signs this chart in the absence of a cardiologist.    Sinus tachycardia with a rate of 108, left axis deviation, no acute ST elevations or depressions.     RADIOLOGY:   Non-plain film images such as CT, Ultrasound and MRI are read by the radiologist. Plain radiographic images are visualized and preliminarily interpreted by the emergency physician with the below findings:          No orders to display           LABS:  Labs Reviewed   CBC WITH AUTO DIFFERENTIAL - Abnormal; Notable for the following:        Result Value    WBC 17.0 (*)     RBC 3.34 (*)     Hemoglobin 10.3 (*)     Hematocrit 32.9 (*)     MCHC 31.3 (*)     RDW 14.8 (*)     Platelets 859 (*)     Neutrophils % 82.9 (*)     Lymphocytes % 10.1 (*)     Neutrophils # 14.1 (*)     Monocytes # 1.00 (*)     All other components within normal limits   COMPREHENSIVE METABOLIC PANEL - Abnormal; Notable for the following:     Chloride 95 (*)     CO2 36 (*)     Glucose 121 (*)     Alb 2.8 (*)     AST 68 (*)     All other components within normal limits   URINE RT REFLEX TO CULTURE - Abnormal; Notable for the following: Clarity, UA CLOUDY (*)     Protein,  (*)     All other components within normal limits   MICROSCOPIC URINALYSIS - Abnormal; Notable for the following:     Casts 0-1 Hyaline (*)     Mucus, UA Rare (*)     All other components within normal limits   CULTURE BLOOD #1   CULTURE BLOOD #2   ACETAMINOPHEN LEVEL   ETHANOL   URINE DRUG SCREEN   PREGNANCY, URINE       All other labs were within normal range or not returned as of this dictation. EMERGENCY DEPARTMENT COURSE and DIFFERENTIAL DIAGNOSIS/MDM:   Vitals:    Vitals:    09/29/18 0307 09/29/18 0426 09/29/18 0454 09/29/18 0459   BP: (!) 98/53 105/62  (!) 93/59   Pulse: 90 85  87   Resp: 18 18  12   Temp: 98.2 °F (36.8 °C) 98.2 °F (36.8 °C)  97.1 °F (36.2 °C)   TempSrc:    Temporal   SpO2: 96% 92%  97%   Weight:   206 lb 2 oz (93.5 kg)    Height:   5' 4\" (1.626 m)        MDM  Number of Diagnoses or Management Options  Accidental drug overdose, initial encounter: new and requires workup  Diagnosis management comments: Poison control recommends 24-hour observation. The patient has not required any further Narcan during her stay. The lab work is unremarkable. I we will speak to the hospitalist about admitting patient for further evaluation. Patient Progress  Patient progress: stable      Reassessment  Patient required some supplemental oxygen during her stay. She denied any suicidal intent. She denied over taking her medication this evening. We spoke with poison control and they suggested a 24 hour observation if this was a methadone overdose. Unfortunately there is no way to confirm what or how much patient took. He is adamant that it was not a suicidal attempt. CONSULTS:  IP CONSULT TO HOSPITALIST    PROCEDURES:  Unless otherwise noted below, none     Procedures    FINAL IMPRESSION      1.  Accidental drug overdose, initial encounter          DISPOSITION/PLAN   DISPOSITION Admitted 09/29/2018 03:44:27 AM      PATIENT REFERRED TO:  RONALD Walker  38

## 2018-09-29 NOTE — PROGRESS NOTES
Ofelia Ramey arrived to room # 325. Presented with: Overdose  Mental Status: Patient is oriented, alert and coherent. Vitals:    09/29/18 0459   BP: (!) 93/59   Pulse: 87   Resp: 12   Temp: 97.1 °F (36.2 °C)   SpO2: 97%     Patient safety contract and falls prevention contract reviewed with patient Yes. Oriented Patient to room. Call light within reach. Yes.   Needs, issues or concerns expressed at this time: no.      Electronically signed by Jacqueline Galicia RN on 9/29/2018 at 6:09 AM

## 2018-09-30 PROBLEM — E87.6 HYPOKALEMIA: Status: ACTIVE | Noted: 2018-09-30

## 2018-09-30 PROBLEM — D72.829 LEUKOCYTOSIS: Status: ACTIVE | Noted: 2018-09-30

## 2018-09-30 LAB
ALBUMIN SERPL-MCNC: 2.5 G/DL (ref 3.5–5.2)
ALP BLD-CCNC: 64 U/L (ref 35–104)
ALT SERPL-CCNC: 20 U/L (ref 5–33)
ANION GAP SERPL CALCULATED.3IONS-SCNC: 13 MMOL/L (ref 7–19)
ANION GAP SERPL CALCULATED.3IONS-SCNC: 6 MMOL/L (ref 7–19)
AST SERPL-CCNC: 17 U/L (ref 5–32)
BASOPHILS ABSOLUTE: 0.1 K/UL (ref 0–0.2)
BASOPHILS RELATIVE PERCENT: 0.8 % (ref 0–1)
BILIRUB SERPL-MCNC: 0.4 MG/DL (ref 0.2–1.2)
BUN BLDV-MCNC: 5 MG/DL (ref 6–20)
BUN BLDV-MCNC: 7 MG/DL (ref 6–20)
CALCIUM SERPL-MCNC: 8.4 MG/DL (ref 8.6–10)
CALCIUM SERPL-MCNC: 8.9 MG/DL (ref 8.6–10)
CHLORIDE BLD-SCNC: 99 MMOL/L (ref 98–111)
CHLORIDE BLD-SCNC: 99 MMOL/L (ref 98–111)
CO2: 30 MMOL/L (ref 22–29)
CO2: 37 MMOL/L (ref 22–29)
CREAT SERPL-MCNC: 0.5 MG/DL (ref 0.5–0.9)
CREAT SERPL-MCNC: 0.6 MG/DL (ref 0.5–0.9)
EKG P AXIS: 45 DEGREES
EKG P-R INTERVAL: 126 MS
EKG Q-T INTERVAL: 364 MS
EKG QRS DURATION: 84 MS
EKG QTC CALCULATION (BAZETT): 448 MS
EKG T AXIS: 13 DEGREES
EOSINOPHILS ABSOLUTE: 0.2 K/UL (ref 0–0.6)
EOSINOPHILS RELATIVE PERCENT: 1.3 % (ref 0–5)
GFR NON-AFRICAN AMERICAN: >60
GFR NON-AFRICAN AMERICAN: >60
GLUCOSE BLD-MCNC: 101 MG/DL (ref 74–109)
GLUCOSE BLD-MCNC: 78 MG/DL (ref 74–109)
HCT VFR BLD CALC: 36.4 % (ref 37–47)
HEMOGLOBIN: 10.6 G/DL (ref 12–16)
LYMPHOCYTES ABSOLUTE: 2.6 K/UL (ref 1.1–4.5)
LYMPHOCYTES RELATIVE PERCENT: 21.6 % (ref 20–40)
MAGNESIUM: 2.1 MG/DL (ref 1.6–2.6)
MCH RBC QN AUTO: 29.8 PG (ref 27–31)
MCHC RBC AUTO-ENTMCNC: 29.1 G/DL (ref 33–37)
MCV RBC AUTO: 102.2 FL (ref 81–99)
MONOCYTES ABSOLUTE: 0.7 K/UL (ref 0–0.9)
MONOCYTES RELATIVE PERCENT: 5.8 % (ref 0–10)
NEUTROPHILS ABSOLUTE: 8.4 K/UL (ref 1.5–7.5)
NEUTROPHILS RELATIVE PERCENT: 70.1 % (ref 50–65)
PDW BLD-RTO: 14.6 % (ref 11.5–14.5)
PLATELET # BLD: 462 K/UL (ref 130–400)
PMV BLD AUTO: 9.1 FL (ref 9.4–12.3)
POTASSIUM REFLEX MAGNESIUM: 2.8 MMOL/L (ref 3.5–5)
POTASSIUM SERPL-SCNC: 3.8 MMOL/L (ref 3.5–5)
RBC # BLD: 3.56 M/UL (ref 4.2–5.4)
SODIUM BLD-SCNC: 142 MMOL/L (ref 136–145)
SODIUM BLD-SCNC: 142 MMOL/L (ref 136–145)
TOTAL PROTEIN: 6.3 G/DL (ref 6.6–8.7)
WBC # BLD: 12 K/UL (ref 4.8–10.8)

## 2018-09-30 PROCEDURE — 6360000002 HC RX W HCPCS: Performed by: INTERNAL MEDICINE

## 2018-09-30 PROCEDURE — 85025 COMPLETE CBC W/AUTO DIFF WBC: CPT

## 2018-09-30 PROCEDURE — 36415 COLL VENOUS BLD VENIPUNCTURE: CPT

## 2018-09-30 PROCEDURE — 96376 TX/PRO/DX INJ SAME DRUG ADON: CPT

## 2018-09-30 PROCEDURE — 6370000000 HC RX 637 (ALT 250 FOR IP): Performed by: INTERNAL MEDICINE

## 2018-09-30 PROCEDURE — 83735 ASSAY OF MAGNESIUM: CPT

## 2018-09-30 PROCEDURE — G0378 HOSPITAL OBSERVATION PER HR: HCPCS

## 2018-09-30 PROCEDURE — 80053 COMPREHEN METABOLIC PANEL: CPT

## 2018-09-30 PROCEDURE — 99238 HOSP IP/OBS DSCHRG MGMT 30/<: CPT | Performed by: INTERNAL MEDICINE

## 2018-09-30 PROCEDURE — 96375 TX/PRO/DX INJ NEW DRUG ADDON: CPT

## 2018-09-30 PROCEDURE — 96372 THER/PROPH/DIAG INJ SC/IM: CPT

## 2018-09-30 RX ORDER — METOPROLOL SUCCINATE 50 MG/1
50 TABLET, EXTENDED RELEASE ORAL DAILY
Status: CANCELLED | OUTPATIENT
Start: 2018-10-01

## 2018-09-30 RX ORDER — ALBUTEROL SULFATE 2.5 MG/3ML
2.5 SOLUTION RESPIRATORY (INHALATION) EVERY 4 HOURS PRN
Status: CANCELLED | OUTPATIENT
Start: 2018-09-30

## 2018-09-30 RX ORDER — PANTOPRAZOLE SODIUM 40 MG/1
40 TABLET, DELAYED RELEASE ORAL DAILY
Status: CANCELLED | OUTPATIENT
Start: 2018-10-01

## 2018-09-30 RX ORDER — VENLAFAXINE HYDROCHLORIDE 75 MG/1
75 CAPSULE, EXTENDED RELEASE ORAL DAILY
Status: CANCELLED | OUTPATIENT
Start: 2018-10-01

## 2018-09-30 RX ORDER — POTASSIUM CHLORIDE 20 MEQ/1
40 TABLET, EXTENDED RELEASE ORAL DAILY
Status: DISCONTINUED | OUTPATIENT
Start: 2018-09-30 | End: 2018-10-01 | Stop reason: HOSPADM

## 2018-09-30 RX ORDER — POTASSIUM CHLORIDE 7.45 MG/ML
10 INJECTION INTRAVENOUS
Status: DISPENSED | OUTPATIENT
Start: 2018-09-30 | End: 2018-09-30

## 2018-09-30 RX ORDER — AMOXICILLIN AND CLAVULANATE POTASSIUM 875; 125 MG/1; MG/1
1 TABLET, FILM COATED ORAL 2 TIMES DAILY
Status: CANCELLED | OUTPATIENT
Start: 2018-09-30

## 2018-09-30 RX ORDER — ACETAMINOPHEN 325 MG/1
650 TABLET ORAL EVERY 6 HOURS PRN
Status: DISCONTINUED | OUTPATIENT
Start: 2018-09-30 | End: 2018-10-01 | Stop reason: HOSPADM

## 2018-09-30 RX ORDER — POTASSIUM CHLORIDE 20 MEQ/1
40 TABLET, EXTENDED RELEASE ORAL DAILY
Status: CANCELLED | OUTPATIENT
Start: 2018-10-01

## 2018-09-30 RX ORDER — POTASSIUM CHLORIDE 7.45 MG/ML
20 INJECTION INTRAVENOUS ONCE
Status: DISCONTINUED | OUTPATIENT
Start: 2018-09-30 | End: 2018-09-30

## 2018-09-30 RX ORDER — POTASSIUM BICARBONATE 25 MEQ/1
50 TABLET, EFFERVESCENT ORAL ONCE
Status: COMPLETED | OUTPATIENT
Start: 2018-09-30 | End: 2018-09-30

## 2018-09-30 RX ORDER — POTASSIUM BICARBONATE 25 MEQ/1
25 TABLET, EFFERVESCENT ORAL ONCE
Status: COMPLETED | OUTPATIENT
Start: 2018-09-30 | End: 2018-09-30

## 2018-09-30 RX ORDER — NICOTINE 21 MG/24HR
1 PATCH, TRANSDERMAL 24 HOURS TRANSDERMAL DAILY
Status: CANCELLED | OUTPATIENT
Start: 2018-10-01

## 2018-09-30 RX ORDER — ONDANSETRON 4 MG/1
4 TABLET, ORALLY DISINTEGRATING ORAL EVERY 8 HOURS PRN
Status: CANCELLED | OUTPATIENT
Start: 2018-09-30

## 2018-09-30 RX ORDER — METHADONE HYDROCHLORIDE 10 MG/1
60 TABLET ORAL DAILY
Status: CANCELLED | OUTPATIENT
Start: 2018-10-01

## 2018-09-30 RX ORDER — ATORVASTATIN CALCIUM 20 MG/1
20 TABLET, FILM COATED ORAL DAILY
Status: CANCELLED | OUTPATIENT
Start: 2018-10-01

## 2018-09-30 RX ADMIN — ACETAMINOPHEN 650 MG: 325 TABLET ORAL at 22:11

## 2018-09-30 RX ADMIN — POTASSIUM BICARBONATE 25 MEQ: 25 TABLET, EFFERVESCENT ORAL at 12:43

## 2018-09-30 RX ADMIN — ONDANSETRON 4 MG: 2 INJECTION INTRAMUSCULAR; INTRAVENOUS at 03:19

## 2018-09-30 RX ADMIN — ATORVASTATIN CALCIUM 20 MG: 20 TABLET, FILM COATED ORAL at 08:52

## 2018-09-30 RX ADMIN — PANTOPRAZOLE SODIUM 40 MG: 40 TABLET, DELAYED RELEASE ORAL at 08:54

## 2018-09-30 RX ADMIN — VENLAFAXINE HYDROCHLORIDE 37.5 MG: 37.5 CAPSULE, EXTENDED RELEASE ORAL at 08:55

## 2018-09-30 RX ADMIN — POTASSIUM BICARBONATE 50 MEQ: 25 TABLET, EFFERVESCENT ORAL at 08:54

## 2018-09-30 RX ADMIN — ONDANSETRON 4 MG: 2 INJECTION INTRAMUSCULAR; INTRAVENOUS at 09:05

## 2018-09-30 RX ADMIN — METHADONE HYDROCHLORIDE 60 MG: 10 TABLET ORAL at 08:52

## 2018-09-30 RX ADMIN — ONDANSETRON 4 MG: 4 TABLET, ORALLY DISINTEGRATING ORAL at 13:17

## 2018-09-30 RX ADMIN — ONDANSETRON 4 MG: 4 TABLET, ORALLY DISINTEGRATING ORAL at 22:10

## 2018-09-30 RX ADMIN — POTASSIUM CHLORIDE 40 MEQ: 20 TABLET, EXTENDED RELEASE ORAL at 08:54

## 2018-09-30 RX ADMIN — VENLAFAXINE HYDROCHLORIDE 75 MG: 75 CAPSULE, EXTENDED RELEASE ORAL at 08:53

## 2018-09-30 RX ADMIN — AMOXICILLIN AND CLAVULANATE POTASSIUM 1 TABLET: 875; 125 TABLET, FILM COATED ORAL at 08:54

## 2018-09-30 RX ADMIN — METOPROLOL SUCCINATE 50 MG: 50 TABLET, EXTENDED RELEASE ORAL at 08:52

## 2018-09-30 RX ADMIN — AMOXICILLIN AND CLAVULANATE POTASSIUM 1 TABLET: 875; 125 TABLET, FILM COATED ORAL at 21:36

## 2018-09-30 RX ADMIN — ENOXAPARIN SODIUM 40 MG: 40 INJECTION SUBCUTANEOUS at 08:52

## 2018-09-30 RX ADMIN — POTASSIUM CHLORIDE 10 MEQ: 7.46 INJECTION, SOLUTION INTRAVENOUS at 11:29

## 2018-09-30 ASSESSMENT — PAIN SCALES - GENERAL
PAINLEVEL_OUTOF10: 4
PAINLEVEL_OUTOF10: 0
PAINLEVEL_OUTOF10: 8
PAINLEVEL_OUTOF10: 7
PAINLEVEL_OUTOF10: 0

## 2018-09-30 NOTE — PROGRESS NOTES
Hospitalist Progress Note  9/30/2018 1:04 PM  Subjective:   Admit Date: 9/29/2018  PCP: Kiki Aquino, APRN  [id number]      Chief Complaint: Unresponsive    Subjective: No acute events overnight. Pt c/o R arm swelling. Pt denies any current SI or HI.     ROS:  14 point review of systems is negative except as specifically addressed above.     Medications:  Current Facility-Administered Medications   Medication Dose Route Frequency Provider Last Rate Last Dose    potassium chloride (KLOR-CON M) extended release tablet 40 mEq  40 mEq Oral Daily Stephanie Merino MD   40 mEq at 09/30/18 0854    potassium chloride 10 mEq/100 mL IVPB (Peripheral Line)  10 mEq Intravenous Q1H Stephanie Merino  mL/hr at 09/30/18 1129 10 mEq at 09/30/18 1129    sodium chloride flush 0.9 % injection 10 mL  10 mL Intravenous 2 times per day Stephanie Merino MD   10 mL at 09/29/18 0953    sodium chloride flush 0.9 % injection 10 mL  10 mL Intravenous PRN Stephanie Merino MD        magnesium hydroxide (MILK OF MAGNESIA) 400 MG/5ML suspension 30 mL  30 mL Oral Daily PRN Stephanie Merino MD        [facility]ron [facility]) injection 4 mg  4 mg Intravenous Q6H PRN Stephanie Merino MD   4 mg at 09/30/18 0905    enoxaparin (LOVENOX) injection 40 mg  40 mg Subcutaneous Daily Stephanie Merino MD   40 mg at 09/30/18 8615    levalbuterol (XOPENEX) nebulizer solution 1.25 mg  1 ampule Nebulization 4x Daily PRN Stephanie Merino MD        amoxicillin-clavulanate (AUGMENTIN) 875-125 MG per tablet 1 tablet  1 tablet Oral BID Stephanie Merino MD   1 tablet at 09/30/18 0854    atorvastatin (LIPITOR) tablet 20 mg  20 mg Oral Daily Stephanie Merino MD   20 mg at 09/30/18 7735    metoprolol succinate (TOPROL XL) extended release tablet 50 mg  50 mg Oral Daily Stephanie Merino MD   50 mg at 09/30/18 9385    ondansetron (ZOFRAN-ODT) disintegrating tablet 4 mg  4 mg Oral Q8H PRN Stephanie Merino MD        pantoprazole

## 2018-09-30 NOTE — DISCHARGE SUMMARY
Tobacco abuse; Pneumonia due to infectious organism, unspecified laterality, unspecified part of lung      pantoprazole (PROTONIX) 40 MG tablet TAKE ONE TABLET BY MOUTH DAILY  Qty: 30 tablet, Refills: 5    Comments: This prescription was filled on 8/10/2018. Any refills authorized will be placed on file. meloxicam (MOBIC) 15 MG tablet TAKE ONE TABLET BY MOUTH DAILY  Qty: 30 tablet, Refills: 5    Comments: This prescription was filled on 6/15/2018. Any refills authorized will be placed on file. Associated Diagnoses: Chronic bilateral low back pain with right-sided sciatica      !! ondansetron (ZOFRAN-ODT) 4 MG disintegrating tablet Take 1 tablet by mouth every 8 hours as needed for Nausea or Vomiting  Qty: 30 tablet, Refills: 0    Comments: This prescription was filled on 2/27/2018. Any refills authorized will be placed on file. Associated Diagnoses: Nausea      !! venlafaxine (EFFEXOR XR) 37.5 MG extended release capsule Take 1 capsule by mouth daily  Qty: 30 capsule, Refills: 11    Comments: This prescription was filled on 9/15/2017. Any refills authorized will be placed on file. Associated Diagnoses: Situational anxiety; Recurrent major depressive disorder, in partial remission ([address]      ! ! venlafaxine (EFFEXOR XR) 75 MG extended release capsule Take 1 capsule by mouth daily  Qty: 30 capsule, Refills: 5    Associated Diagnoses: Situational anxiety; Recurrent major depressive disorder, in partial remission (HCC)      atorvastatin (LIPITOR) 20 MG tablet Take 1 tablet by mouth daily  Qty: 30 tablet, Refills: 11    Comments: This prescription was filled on 4/20/2018. Any refills authorized will be placed on file. metoprolol succinate (TOPROL XL) 50 MG extended release tablet TAKE ONE TABLET BY MOUTH DAILY  Qty: 30 tablet, Refills: 11    Comments: This prescription was filled on 12/1/2017. Any refills authorized will be placed on file.       methadone (METHADOSE) 40 MG disintegrating tablet Take 80 mg by mouth daily. .       !! - Potential duplicate medications found. Please discuss with provider.         Current Facility-Administered Medications   Medication Dose Route Frequency Provider Last Rate Last Dose    potassium chloride (KLOR-CON M) extended release tablet 40 mEq  40 mEq Oral Daily Magnus Meza MD   40 mEq at 09/30/18 0854    sodium chloride flush 0.9 % injection 10 mL  10 mL Intravenous 2 times per day Magnus Meza MD   10 mL at 09/29/18 0953    sodium chloride flush 0.9 % injection 10 mL  10 mL Intravenous PRN Magnus Meza MD        magnesium hydroxide (MILK OF MAGNESIA) 400 MG/5ML suspension 30 mL  30 mL Oral Daily PRN Magnus Meza MD        ondansetron [facility]) injection 4 mg  4 mg Intravenous Q6H PRN Magnus Meza MD   4 mg at 09/30/18 0905    enoxaparin (LOVENOX) injection 40 mg  40 mg Subcutaneous Daily Magnus Meza MD   40 mg at 09/30/18 7438    levalbuterol (XOPENEX) nebulizer solution 1.25 mg  1 ampule Nebulization 4x Daily PRN Magnus Meza MD        amoxicillin-clavulanate (AUGMENTIN) 875-125 MG per tablet 1 tablet  1 tablet Oral BID Magnus Meza MD   1 tablet at 09/30/18 0854    atorvastatin (LIPITOR) tablet 20 mg  20 mg Oral Daily Magnus Meza MD   20 mg at 09/30/18 3378    metoprolol succinate (TOPROL XL) extended release tablet 50 mg  50 mg Oral Daily Magnus Meza MD   50 mg at 09/30/18 7581    ondansetron (ZOFRAN-ODT) disintegrating tablet 4 mg  4 mg Oral Q8H PRN Magnus Meza MD   4 mg at 09/30/18 1317    pantoprazole (PROTONIX) tablet 40 mg  40 mg Oral Daily Mangus Meza MD   40 mg at 09/30/18 0854    venlafaxine (EFFEXOR XR) extended release capsule 37.5 mg  37.5 mg Oral Daily Magnus Meza MD   37.5 mg at 09/30/18 0855    venlafaxine (EFFEXOR XR) extended release capsule 75 mg  75 mg Oral Daily Magnus Meza MD   75 mg at 09/30/18 0853    methadone (DOLOPHINE) tablet 60 mg  60 mg Oral Daily Chyrl Brunner, MD   60 mg at 09/30/18 8348    nicotine (NICODERM CQ) 21 MG/24HR 1 patch  1 patch Transdermal Daily Chyrl Brunner, MD   1 patch at 09/30/18 2461       Time Spent on discharge is more than 30 minutes in the examination, evaluation, counseling and review of medications and discharge plan. Chyrl Brunner, MD  Internal Medicine Hospitalist    Thank you RONALD Lora for the opportunity to be involved in this patient's care.  If you have any questions or concerns please feel free to contact me at [phone]

## 2018-10-01 ENCOUNTER — HOSPITAL ENCOUNTER (INPATIENT)
Age: 38
LOS: 3 days | Discharge: HOME | DRG: 885 | End: 2018-10-04
Attending: PSYCHIATRY & NEUROLOGY | Admitting: PSYCHIATRY & NEUROLOGY
Payer: MEDICAID

## 2018-10-01 ENCOUNTER — APPOINTMENT (OUTPATIENT)
Dept: GENERAL RADIOLOGY | Age: 38
DRG: 885 | End: 2018-10-01
Payer: MEDICAID

## 2018-10-01 VITALS
SYSTOLIC BLOOD PRESSURE: 127 MMHG | OXYGEN SATURATION: 95 % | WEIGHT: 206.13 LBS | TEMPERATURE: 97.9 F | RESPIRATION RATE: 18 BRPM | HEIGHT: 64 IN | BODY MASS INDEX: 35.19 KG/M2 | DIASTOLIC BLOOD PRESSURE: 71 MMHG | HEART RATE: 88 BPM

## 2018-10-01 PROBLEM — F33.2 MAJOR DEPRESSIVE DISORDER, RECURRENT EPISODE, SEVERE WITH ANXIOUS DISTRESS (HCC): Status: ACTIVE | Noted: 2018-10-01

## 2018-10-01 LAB
ALBUMIN SERPL-MCNC: 2.7 G/DL (ref 3.5–5.2)
ALP BLD-CCNC: 67 U/L (ref 35–104)
ALT SERPL-CCNC: 17 U/L (ref 5–33)
ANION GAP SERPL CALCULATED.3IONS-SCNC: 11 MMOL/L (ref 7–19)
AST SERPL-CCNC: 15 U/L (ref 5–32)
BASOPHILS ABSOLUTE: 0.1 K/UL (ref 0–0.2)
BASOPHILS RELATIVE PERCENT: 0.6 % (ref 0–1)
BILIRUB SERPL-MCNC: 0.5 MG/DL (ref 0.2–1.2)
BUN BLDV-MCNC: 6 MG/DL (ref 6–20)
CALCIUM SERPL-MCNC: 8.9 MG/DL (ref 8.6–10)
CHLORIDE BLD-SCNC: 101 MMOL/L (ref 98–111)
CO2: 29 MMOL/L (ref 22–29)
CREAT SERPL-MCNC: 0.5 MG/DL (ref 0.5–0.9)
EOSINOPHILS ABSOLUTE: 0 K/UL (ref 0–0.6)
EOSINOPHILS RELATIVE PERCENT: 0.1 % (ref 0–5)
GFR NON-AFRICAN AMERICAN: >60
GLUCOSE BLD-MCNC: 109 MG/DL (ref 74–109)
HCT VFR BLD CALC: 36 % (ref 37–47)
HEMOGLOBIN: 10.8 G/DL (ref 12–16)
LYMPHOCYTES ABSOLUTE: 1.8 K/UL (ref 1.1–4.5)
LYMPHOCYTES RELATIVE PERCENT: 17.4 % (ref 20–40)
MCH RBC QN AUTO: 30.2 PG (ref 27–31)
MCHC RBC AUTO-ENTMCNC: 30 G/DL (ref 33–37)
MCV RBC AUTO: 100.6 FL (ref 81–99)
MONOCYTES ABSOLUTE: 0.3 K/UL (ref 0–0.9)
MONOCYTES RELATIVE PERCENT: 3.1 % (ref 0–10)
NEUTROPHILS ABSOLUTE: 8 K/UL (ref 1.5–7.5)
NEUTROPHILS RELATIVE PERCENT: 78.5 % (ref 50–65)
PDW BLD-RTO: 14.2 % (ref 11.5–14.5)
PLATELET # BLD: 451 K/UL (ref 130–400)
PMV BLD AUTO: 9.7 FL (ref 9.4–12.3)
POTASSIUM SERPL-SCNC: 4.5 MMOL/L (ref 3.5–5)
RBC # BLD: 3.58 M/UL (ref 4.2–5.4)
SODIUM BLD-SCNC: 141 MMOL/L (ref 136–145)
TOTAL PROTEIN: 6.8 G/DL (ref 6.6–8.7)
WBC # BLD: 10.1 K/UL (ref 4.8–10.8)

## 2018-10-01 PROCEDURE — 85025 COMPLETE CBC W/AUTO DIFF WBC: CPT

## 2018-10-01 PROCEDURE — 36415 COLL VENOUS BLD VENIPUNCTURE: CPT

## 2018-10-01 PROCEDURE — G0378 HOSPITAL OBSERVATION PER HR: HCPCS

## 2018-10-01 PROCEDURE — 6360000002 HC RX W HCPCS: Performed by: INTERNAL MEDICINE

## 2018-10-01 PROCEDURE — 99238 HOSP IP/OBS DSCHRG MGMT 30/<: CPT | Performed by: INTERNAL MEDICINE

## 2018-10-01 PROCEDURE — 71045 X-RAY EXAM CHEST 1 VIEW: CPT

## 2018-10-01 PROCEDURE — 96372 THER/PROPH/DIAG INJ SC/IM: CPT

## 2018-10-01 PROCEDURE — 6370000000 HC RX 637 (ALT 250 FOR IP): Performed by: PSYCHIATRY & NEUROLOGY

## 2018-10-01 PROCEDURE — 1240000000 HC EMOTIONAL WELLNESS R&B

## 2018-10-01 PROCEDURE — 93005 ELECTROCARDIOGRAM TRACING: CPT

## 2018-10-01 PROCEDURE — 6370000000 HC RX 637 (ALT 250 FOR IP): Performed by: INTERNAL MEDICINE

## 2018-10-01 PROCEDURE — 80053 COMPREHEN METABOLIC PANEL: CPT

## 2018-10-01 PROCEDURE — 6360000002 HC RX W HCPCS: Performed by: PSYCHIATRY & NEUROLOGY

## 2018-10-01 RX ORDER — ACETAMINOPHEN 325 MG/1
650 TABLET ORAL EVERY 4 HOURS PRN
Status: DISCONTINUED | OUTPATIENT
Start: 2018-10-01 | End: 2018-10-04 | Stop reason: HOSPADM

## 2018-10-01 RX ORDER — VENLAFAXINE HYDROCHLORIDE 37.5 MG/1
37.5 CAPSULE, EXTENDED RELEASE ORAL DAILY
Status: DISCONTINUED | OUTPATIENT
Start: 2018-10-01 | End: 2018-10-04 | Stop reason: HOSPADM

## 2018-10-01 RX ORDER — GABAPENTIN 400 MG/1
800 CAPSULE ORAL 4 TIMES DAILY
Status: DISCONTINUED | OUTPATIENT
Start: 2018-10-01 | End: 2018-10-04 | Stop reason: HOSPADM

## 2018-10-01 RX ORDER — VENLAFAXINE HYDROCHLORIDE 75 MG/1
75 CAPSULE, EXTENDED RELEASE ORAL DAILY
Status: DISCONTINUED | OUTPATIENT
Start: 2018-10-01 | End: 2018-10-04 | Stop reason: HOSPADM

## 2018-10-01 RX ORDER — METHADONE HYDROCHLORIDE 10 MG/1
60 TABLET ORAL DAILY
Status: DISCONTINUED | OUTPATIENT
Start: 2018-10-02 | End: 2018-10-01 | Stop reason: ALTCHOICE

## 2018-10-01 RX ORDER — ATORVASTATIN CALCIUM 10 MG/1
20 TABLET, FILM COATED ORAL DAILY
Status: DISCONTINUED | OUTPATIENT
Start: 2018-10-02 | End: 2018-10-01 | Stop reason: ALTCHOICE

## 2018-10-01 RX ORDER — PANTOPRAZOLE SODIUM 40 MG/1
40 TABLET, DELAYED RELEASE ORAL DAILY
Status: DISCONTINUED | OUTPATIENT
Start: 2018-10-01 | End: 2018-10-04 | Stop reason: HOSPADM

## 2018-10-01 RX ORDER — ALBUTEROL SULFATE 2.5 MG/3ML
2.5 SOLUTION RESPIRATORY (INHALATION) EVERY 4 HOURS PRN
Status: DISCONTINUED | OUTPATIENT
Start: 2018-10-01 | End: 2018-10-04 | Stop reason: HOSPADM

## 2018-10-01 RX ORDER — PANTOPRAZOLE SODIUM 40 MG/1
40 TABLET, DELAYED RELEASE ORAL DAILY
Status: DISCONTINUED | OUTPATIENT
Start: 2018-10-02 | End: 2018-10-01 | Stop reason: ALTCHOICE

## 2018-10-01 RX ORDER — ALBUTEROL SULFATE 2.5 MG/3ML
1.25 SOLUTION RESPIRATORY (INHALATION) EVERY 6 HOURS PRN
Status: DISCONTINUED | OUTPATIENT
Start: 2018-10-01 | End: 2018-10-04 | Stop reason: HOSPADM

## 2018-10-01 RX ORDER — METOPROLOL SUCCINATE 50 MG/1
50 TABLET, EXTENDED RELEASE ORAL DAILY
Status: DISCONTINUED | OUTPATIENT
Start: 2018-10-02 | End: 2018-10-01 | Stop reason: ALTCHOICE

## 2018-10-01 RX ORDER — VENLAFAXINE HYDROCHLORIDE 75 MG/1
75 CAPSULE, EXTENDED RELEASE ORAL DAILY
Status: DISCONTINUED | OUTPATIENT
Start: 2018-10-02 | End: 2018-10-01 | Stop reason: SDUPTHER

## 2018-10-01 RX ORDER — AMOXICILLIN AND CLAVULANATE POTASSIUM 875; 125 MG/1; MG/1
1 TABLET, FILM COATED ORAL 2 TIMES DAILY
Status: DISCONTINUED | OUTPATIENT
Start: 2018-10-01 | End: 2018-10-04 | Stop reason: HOSPADM

## 2018-10-01 RX ORDER — NICOTINE 21 MG/24HR
1 PATCH, TRANSDERMAL 24 HOURS TRANSDERMAL DAILY
Status: DISCONTINUED | OUTPATIENT
Start: 2018-10-02 | End: 2018-10-04 | Stop reason: HOSPADM

## 2018-10-01 RX ORDER — ATORVASTATIN CALCIUM 10 MG/1
20 TABLET, FILM COATED ORAL DAILY
Status: DISCONTINUED | OUTPATIENT
Start: 2018-10-01 | End: 2018-10-04 | Stop reason: HOSPADM

## 2018-10-01 RX ORDER — ONDANSETRON 4 MG/1
4 TABLET, ORALLY DISINTEGRATING ORAL EVERY 8 HOURS PRN
Status: DISCONTINUED | OUTPATIENT
Start: 2018-10-01 | End: 2018-10-04 | Stop reason: HOSPADM

## 2018-10-01 RX ORDER — CLONIDINE HYDROCHLORIDE 0.1 MG/1
0.2 TABLET ORAL EVERY 6 HOURS PRN
Status: DISCONTINUED | OUTPATIENT
Start: 2018-10-01 | End: 2018-10-04 | Stop reason: HOSPADM

## 2018-10-01 RX ORDER — METHADONE HYDROCHLORIDE 10 MG/1
80 TABLET ORAL DAILY
Status: DISCONTINUED | OUTPATIENT
Start: 2018-10-01 | End: 2018-10-04 | Stop reason: HOSPADM

## 2018-10-01 RX ORDER — METOPROLOL SUCCINATE 50 MG/1
50 TABLET, EXTENDED RELEASE ORAL DAILY
Status: DISCONTINUED | OUTPATIENT
Start: 2018-10-01 | End: 2018-10-04 | Stop reason: HOSPADM

## 2018-10-01 RX ORDER — ONDANSETRON 4 MG/1
4 TABLET, FILM COATED ORAL EVERY 6 HOURS PRN
Status: DISCONTINUED | OUTPATIENT
Start: 2018-10-01 | End: 2018-10-01

## 2018-10-01 RX ORDER — POTASSIUM CHLORIDE 20 MEQ/1
40 TABLET, EXTENDED RELEASE ORAL DAILY
Status: DISCONTINUED | OUTPATIENT
Start: 2018-10-02 | End: 2018-10-04 | Stop reason: HOSPADM

## 2018-10-01 RX ADMIN — AMOXICILLIN AND CLAVULANATE POTASSIUM 1 TABLET: 875; 125 TABLET, FILM COATED ORAL at 20:47

## 2018-10-01 RX ADMIN — POTASSIUM CHLORIDE 40 MEQ: 20 TABLET, EXTENDED RELEASE ORAL at 08:49

## 2018-10-01 RX ADMIN — VENLAFAXINE HYDROCHLORIDE 37.5 MG: 37.5 CAPSULE, EXTENDED RELEASE ORAL at 08:48

## 2018-10-01 RX ADMIN — PANTOPRAZOLE SODIUM 40 MG: 40 TABLET, DELAYED RELEASE ORAL at 08:48

## 2018-10-01 RX ADMIN — METOPROLOL SUCCINATE 50 MG: 50 TABLET, EXTENDED RELEASE ORAL at 08:48

## 2018-10-01 RX ADMIN — METHADONE HYDROCHLORIDE 60 MG: 10 TABLET ORAL at 08:49

## 2018-10-01 RX ADMIN — ONDANSETRON 4 MG: 4 TABLET, ORALLY DISINTEGRATING ORAL at 06:14

## 2018-10-01 RX ADMIN — VENLAFAXINE HYDROCHLORIDE 75 MG: 75 CAPSULE, EXTENDED RELEASE ORAL at 08:48

## 2018-10-01 RX ADMIN — ONDANSETRON 4 MG: 4 TABLET, ORALLY DISINTEGRATING ORAL at 15:22

## 2018-10-01 RX ADMIN — ONDANSETRON 4 MG: 4 TABLET, ORALLY DISINTEGRATING ORAL at 20:50

## 2018-10-01 RX ADMIN — ENOXAPARIN SODIUM 40 MG: 40 INJECTION SUBCUTANEOUS at 08:48

## 2018-10-01 RX ADMIN — AMOXICILLIN AND CLAVULANATE POTASSIUM 1 TABLET: 875; 125 TABLET, FILM COATED ORAL at 08:48

## 2018-10-01 RX ADMIN — ATORVASTATIN CALCIUM 20 MG: 20 TABLET, FILM COATED ORAL at 08:48

## 2018-10-01 RX ADMIN — GABAPENTIN 800 MG: 400 CAPSULE ORAL at 20:47

## 2018-10-01 ASSESSMENT — SLEEP AND FATIGUE QUESTIONNAIRES
DIFFICULTY FALLING ASLEEP: YES
DIFFICULTY ARISING: NO
DO YOU USE A SLEEP AID: YES
RESTFUL SLEEP: NO
DIFFICULTY STAYING ASLEEP: YES
AVERAGE NUMBER OF SLEEP HOURS: 5
DO YOU HAVE DIFFICULTY SLEEPING: YES
SLEEP PATTERN: DIFFICULTY FALLING ASLEEP;DIFFICULTY ARISING

## 2018-10-01 ASSESSMENT — PAIN SCALES - GENERAL: PAINLEVEL_OUTOF10: 10

## 2018-10-01 ASSESSMENT — LIFESTYLE VARIABLES: HISTORY_ALCOHOL_USE: NO

## 2018-10-01 ASSESSMENT — PATIENT HEALTH QUESTIONNAIRE - PHQ9: SUM OF ALL RESPONSES TO PHQ QUESTIONS 1-9: 15

## 2018-10-02 LAB
EKG P AXIS: 45 DEGREES
EKG P-R INTERVAL: 130 MS
EKG Q-T INTERVAL: 406 MS
EKG QRS DURATION: 86 MS
EKG QTC CALCULATION (BAZETT): 427 MS
EKG T AXIS: -2 DEGREES

## 2018-10-02 PROCEDURE — 6360000002 HC RX W HCPCS: Performed by: INTERNAL MEDICINE

## 2018-10-02 PROCEDURE — 6370000000 HC RX 637 (ALT 250 FOR IP): Performed by: PSYCHIATRY & NEUROLOGY

## 2018-10-02 PROCEDURE — 6370000000 HC RX 637 (ALT 250 FOR IP): Performed by: INTERNAL MEDICINE

## 2018-10-02 PROCEDURE — 1240000000 HC EMOTIONAL WELLNESS R&B

## 2018-10-02 PROCEDURE — 99223 1ST HOSP IP/OBS HIGH 75: CPT | Performed by: PSYCHIATRY & NEUROLOGY

## 2018-10-02 PROCEDURE — 99222 1ST HOSP IP/OBS MODERATE 55: CPT | Performed by: NURSE PRACTITIONER

## 2018-10-02 RX ORDER — DICYCLOMINE HYDROCHLORIDE 10 MG/1
10 CAPSULE ORAL EVERY 6 HOURS PRN
Status: DISCONTINUED | OUTPATIENT
Start: 2018-10-02 | End: 2018-10-04 | Stop reason: HOSPADM

## 2018-10-02 RX ORDER — TRAZODONE HYDROCHLORIDE 50 MG/1
50 TABLET ORAL NIGHTLY
Status: DISCONTINUED | OUTPATIENT
Start: 2018-10-02 | End: 2018-10-03

## 2018-10-02 RX ADMIN — GABAPENTIN 800 MG: 400 CAPSULE ORAL at 17:10

## 2018-10-02 RX ADMIN — PANTOPRAZOLE SODIUM 40 MG: 40 TABLET, DELAYED RELEASE ORAL at 08:57

## 2018-10-02 RX ADMIN — VENLAFAXINE HYDROCHLORIDE 37.5 MG: 37.5 CAPSULE, EXTENDED RELEASE ORAL at 08:59

## 2018-10-02 RX ADMIN — AMOXICILLIN AND CLAVULANATE POTASSIUM 1 TABLET: 875; 125 TABLET, FILM COATED ORAL at 20:36

## 2018-10-02 RX ADMIN — METHADONE HYDROCHLORIDE 80 MG: 10 TABLET ORAL at 09:00

## 2018-10-02 RX ADMIN — ACETAMINOPHEN 650 MG: 325 TABLET ORAL at 14:48

## 2018-10-02 RX ADMIN — GABAPENTIN 800 MG: 400 CAPSULE ORAL at 08:58

## 2018-10-02 RX ADMIN — VENLAFAXINE HYDROCHLORIDE 75 MG: 75 CAPSULE, EXTENDED RELEASE ORAL at 09:02

## 2018-10-02 RX ADMIN — GABAPENTIN 800 MG: 400 CAPSULE ORAL at 20:36

## 2018-10-02 RX ADMIN — POTASSIUM CHLORIDE 40 MEQ: 20 TABLET, EXTENDED RELEASE ORAL at 08:58

## 2018-10-02 RX ADMIN — ONDANSETRON 4 MG: 4 TABLET, ORALLY DISINTEGRATING ORAL at 06:57

## 2018-10-02 RX ADMIN — DICYCLOMINE HYDROCHLORIDE 10 MG: 10 CAPSULE ORAL at 15:31

## 2018-10-02 RX ADMIN — ATORVASTATIN CALCIUM 20 MG: 10 TABLET, FILM COATED ORAL at 08:57

## 2018-10-02 RX ADMIN — GABAPENTIN 800 MG: 400 CAPSULE ORAL at 14:43

## 2018-10-02 RX ADMIN — ONDANSETRON 4 MG: 4 TABLET, ORALLY DISINTEGRATING ORAL at 14:46

## 2018-10-02 RX ADMIN — TRAZODONE HYDROCHLORIDE 50 MG: 50 TABLET ORAL at 22:12

## 2018-10-02 RX ADMIN — METOPROLOL SUCCINATE 50 MG: 50 TABLET, EXTENDED RELEASE ORAL at 08:56

## 2018-10-02 RX ADMIN — AMOXICILLIN AND CLAVULANATE POTASSIUM 1 TABLET: 875; 125 TABLET, FILM COATED ORAL at 08:57

## 2018-10-02 RX ADMIN — TRAZODONE HYDROCHLORIDE 50 MG: 50 TABLET ORAL at 20:36

## 2018-10-02 ASSESSMENT — ENCOUNTER SYMPTOMS
EYES NEGATIVE: 1
GASTROINTESTINAL NEGATIVE: 1
RESPIRATORY NEGATIVE: 1

## 2018-10-02 ASSESSMENT — PAIN SCALES - GENERAL
PAINLEVEL_OUTOF10: 8
PAINLEVEL_OUTOF10: 7

## 2018-10-02 NOTE — PLAN OF CARE
Problem: Altered Mood, Depressive Behavior:  Goal: Able to verbalize acceptance of life and situations over which he or she has no control  Able to verbalize acceptance of life and situations over which he or she has no control   Outcome: Ongoing    Goal: Able to verbalize and/or display a decrease in depressive symptoms  Able to verbalize and/or display a decrease in depressive symptoms   Outcome: Ongoing    Goal: Ability to disclose and discuss suicidal ideas will improve  Ability to disclose and discuss suicidal ideas will improve   Outcome: Ongoing    Goal: Able to verbalize support systems  Able to verbalize support systems   Outcome: Ongoing    Goal: Absence of self-harm  Absence of self-harm   Outcome: Ongoing    Goal: Patient specific goal  Patient specific goal   Outcome: Ongoing    Goal: Participates in care planning  Participates in care planning   Outcome: Ongoing      Problem: Depressive Behavior With or Without Suicide Precautions:  Goal: Able to verbalize acceptance of life and situations over which he or she has no control  Able to verbalize acceptance of life and situations over which he or she has no control   Outcome: Ongoing    Goal: Able to verbalize and/or display a decrease in depressive symptoms  Able to verbalize and/or display a decrease in depressive symptoms   Outcome: Ongoing    Goal: Ability to disclose and discuss suicidal ideas will improve  Ability to disclose and discuss suicidal ideas will improve   Outcome: Ongoing    Goal: Able to verbalize support systems  Able to verbalize support systems   Outcome: Ongoing    Goal: Absence of self-harm  Absence of self-harm   Outcome: Ongoing    Goal: Patient specific goal  Patient specific goal   Outcome: Ongoing    Goal: Participates in care planning  Participates in care planning   Outcome: Ongoing      Problem: Risk of Harm:  Goal: Ability to remain free from injury will improve  Ability to remain free from injury will improve   Outcome:

## 2018-10-02 NOTE — PLAN OF CARE
Problem: Altered Mood, Depressive Behavior:  Goal: Patient specific goal  Patient specific goal   Outcome: Ongoing                                                                      Group Therapy Note    Date: 10/2/2018  Start Time: 1100  End Time:  5337  Number of Participants: 15    Type of Group: Psychoeducation    Wellness Binder Information  Module Name:  Emotional wellness  Session Number:  5    Patient's Goal:  Obstacles to emotional wellness    Notes:  Pt acknowledged negative thinking and negative behavior as obstacles to emotional wellness.     Status After Intervention:  Improved    Participation Level: Interactive    Participation Quality: Appropriate, Attentive and Sharing      Speech:  normal      Thought Process/Content: Logical      Affective Functioning: Congruent      Mood: congruent      Level of consciousness:  Alert, Oriented x4 and Attentive      Response to Learning: Able to verbalize current knowledge/experience      Endings: None Reported    Modes of Intervention: Education      Discipline Responsible: Psychoeducational Specialist      Signature:  Elvia Marcelo

## 2018-10-02 NOTE — H&P
(MOBIC) 15 MG tablet, TAKE ONE TABLET BY MOUTH DAILY  ondansetron (ZOFRAN-ODT) 4 MG disintegrating tablet, Take 1 tablet by mouth every 8 hours as needed for Nausea or Vomiting  venlafaxine (EFFEXOR XR) 37.5 MG extended release capsule, Take 1 capsule by mouth daily  venlafaxine (EFFEXOR XR) 75 MG extended release capsule, Take 1 capsule by mouth daily  atorvastatin (LIPITOR) 20 MG tablet, Take 1 tablet by mouth daily  metoprolol succinate (TOPROL XL) 50 MG extended release tablet, TAKE ONE TABLET BY MOUTH DAILY  methadone (METHADOSE) 40 MG disintegrating tablet, Take 80 mg by mouth daily. .    Allergies:  Codeine    Social History:  Single, never , no children  Smoking 1/2 PPD for 19 years  Alcohol - denies  Illicit drugs - denies    Family History:   Family History   Problem Relation Age of Onset    Cancer Maternal Grandmother     Liver Cancer Maternal Grandmother     Cancer Paternal Grandmother     Stroke Father     Colon Cancer Neg Hx     Colon Polyps Neg Hx     Esophageal Cancer Neg Hx     Stomach Cancer Neg Hx     Liver Disease Neg Hx     Rectal Cancer Neg Hx      Psychiatric Family History  No family history    REVIEW OF SYSTEMS:  General: No fevers, chills, night sweats, no recent weight loss or gain. Head: No headache, no migraine. Eyes: No recent visual changes. Ears: No recent hearing changes. Nose: No increased congestion or change in sense of smell. Throat: No sore throat, no trouble swallowing. Cardiovascular: No chest pain or palpitations, or dizziness. Respiratory: No cough, wheezes, congestion, or shortness of breath                           Gastrointestinal: endorses mild nausea and abdominal cramps. No vomiting, diarrhea or constipation.       Musculo-skeletal: No edema, deformities, or loss

## 2018-10-02 NOTE — PROGRESS NOTES
B[facility] Daily Shift Assessment  Nursing Progress Note    Room: [facility]/608-01 Name: Xavier Levin Age: 43 y.o.    Ethnicity:  Gender: female   Dx: <principal problem not specified>  Precautions: suicide risk  CPAP: No Accu-Chek: No  MSE:  Status and Exam  Normal: No  Facial Expression: Flat  Affect: Appropriate  Level of Consciousness: Alert  Mood:Normal: No  Mood: Anxious, Depressed  Motor Activity:Normal: No  Motor Activity: Decreased  Interview Behavior: Cooperative  Preception: Alden to Person, Judythe Mate to Time, Alden to Place, Alden to Situation  Attention:Normal: No  Attention: Unable to Concentrate  Thought Processes: Circumstantial  Thought Content:Normal: No  Thought Content: Preoccupations  Hallucinations: None  Delusions: No  Memory:Normal: No  Memory: Poor Recent, Poor Remote  Insight and Judgment: No  Insight and Judgment: Poor Judgment, Poor Insight  Present Suicidal Ideation: No  Present Homicidal Ideation: No  Sleep: No, Very poor, has difficulty falling asleep, has restless sleep and is not rested upon awakening Hours Slept: 1  Other PRN Meds: No Med Compliant: Yes Appetite: good Percent Meals: 100% Social: Yes ADLs: Yes Speech: normal Depression: 2 Anxiety: 2    Marshal Valles RN

## 2018-10-03 PROBLEM — F33.2 MAJOR DEPRESSIVE DISORDER, RECURRENT EPISODE, SEVERE WITH ANXIOUS DISTRESS (HCC): Status: RESOLVED | Noted: 2018-10-01 | Resolved: 2018-10-03

## 2018-10-03 PROCEDURE — 6370000000 HC RX 637 (ALT 250 FOR IP): Performed by: PSYCHIATRY & NEUROLOGY

## 2018-10-03 PROCEDURE — 1240000000 HC EMOTIONAL WELLNESS R&B

## 2018-10-03 PROCEDURE — 6360000002 HC RX W HCPCS: Performed by: INTERNAL MEDICINE

## 2018-10-03 PROCEDURE — 6370000000 HC RX 637 (ALT 250 FOR IP): Performed by: INTERNAL MEDICINE

## 2018-10-03 PROCEDURE — 99233 SBSQ HOSP IP/OBS HIGH 50: CPT | Performed by: PSYCHIATRY & NEUROLOGY

## 2018-10-03 RX ORDER — TRAZODONE HYDROCHLORIDE 100 MG/1
100 TABLET ORAL NIGHTLY
Status: DISCONTINUED | OUTPATIENT
Start: 2018-10-03 | End: 2018-10-04 | Stop reason: HOSPADM

## 2018-10-03 RX ADMIN — ONDANSETRON 4 MG: 4 TABLET, ORALLY DISINTEGRATING ORAL at 09:02

## 2018-10-03 RX ADMIN — PANTOPRAZOLE SODIUM 40 MG: 40 TABLET, DELAYED RELEASE ORAL at 08:16

## 2018-10-03 RX ADMIN — TRAZODONE HYDROCHLORIDE 100 MG: 100 TABLET ORAL at 21:03

## 2018-10-03 RX ADMIN — GABAPENTIN 800 MG: 400 CAPSULE ORAL at 08:17

## 2018-10-03 RX ADMIN — VENLAFAXINE HYDROCHLORIDE 75 MG: 75 CAPSULE, EXTENDED RELEASE ORAL at 08:16

## 2018-10-03 RX ADMIN — AMOXICILLIN AND CLAVULANATE POTASSIUM 1 TABLET: 875; 125 TABLET, FILM COATED ORAL at 20:32

## 2018-10-03 RX ADMIN — ACETAMINOPHEN 650 MG: 325 TABLET ORAL at 19:15

## 2018-10-03 RX ADMIN — AMOXICILLIN AND CLAVULANATE POTASSIUM 1 TABLET: 875; 125 TABLET, FILM COATED ORAL at 08:16

## 2018-10-03 RX ADMIN — GABAPENTIN 800 MG: 400 CAPSULE ORAL at 12:20

## 2018-10-03 RX ADMIN — METHADONE HYDROCHLORIDE 80 MG: 10 TABLET ORAL at 08:16

## 2018-10-03 RX ADMIN — TRAZODONE HYDROCHLORIDE 100 MG: 100 TABLET ORAL at 20:32

## 2018-10-03 RX ADMIN — DICYCLOMINE HYDROCHLORIDE 10 MG: 10 CAPSULE ORAL at 11:54

## 2018-10-03 RX ADMIN — GABAPENTIN 800 MG: 400 CAPSULE ORAL at 20:33

## 2018-10-03 RX ADMIN — ATORVASTATIN CALCIUM 20 MG: 10 TABLET, FILM COATED ORAL at 08:16

## 2018-10-03 RX ADMIN — METOPROLOL SUCCINATE 50 MG: 50 TABLET, EXTENDED RELEASE ORAL at 08:16

## 2018-10-03 RX ADMIN — POTASSIUM CHLORIDE 40 MEQ: 20 TABLET, EXTENDED RELEASE ORAL at 08:16

## 2018-10-03 RX ADMIN — GABAPENTIN 800 MG: 400 CAPSULE ORAL at 16:09

## 2018-10-03 RX ADMIN — VENLAFAXINE HYDROCHLORIDE 37.5 MG: 37.5 CAPSULE, EXTENDED RELEASE ORAL at 08:17

## 2018-10-03 ASSESSMENT — PAIN SCALES - GENERAL
PAINLEVEL_OUTOF10: 7
PAINLEVEL_OUTOF10: 9
PAINLEVEL_OUTOF10: 8

## 2018-10-03 NOTE — PLAN OF CARE
Problem: Altered Mood, Depressive Behavior:  Goal: Patient specific goal  Patient specific goal   Outcome: Ongoing                                                                      Group Therapy Note    Date: 10/3/2018  Start Time: 4440  End Time:  9821  Number of Participants: 11    Type of Group: Recovery    Wellness Binder Information  Module Name:  Relapse prevention  Session Number:  3    Patient's Goal:  Too much stress can lead to relapse    Notes:  Pt acknowledged recognizing limitations as a way to help prevent relapse.     Status After Intervention:  Improved    Participation Level: Interactive    Participation Quality: Appropriate, Attentive and Sharing      Speech:  normal      Thought Process/Content: Logical      Affective Functioning: Congruent      Mood: congruent      Level of consciousness:  Alert, Oriented x4 and Attentive      Response to Learning: Able to verbalize current knowledge/experience      Endings: None Reported    Modes of Intervention: Education      Discipline Responsible: Psychoeducational Specialist      Signature:  Elvia Marcelo

## 2018-10-03 NOTE — PROGRESS NOTES
Department of Psychiatry  Attending Progress Note      SUBJECTIVE:    The patient is a 43 y.o. female with previous h/o depression and opioid use disorder, who presents with overdose of prescribed Methadone. Today, patient reported that she feels good and she is in \"great\" mood. Patient endorses good appetite and good sleep last night. She was able to sleep all night with Trazodone 100 mg total dose. She reported good energy level and good motivation today. Patient is compliant with currently prescribed medications and denies any side effects. She attends group and other unit activities, and participates in those activities. Patient denies any suicidal or homicidal ideations or plans. Denies any auditory or visual hallucinations. OBJECTIVE    Physical  VITALS:  /73   Pulse 111   Temp 97.6 °F (36.4 °C) (Temporal)   Resp 20   LMP 2013   SpO2 91%   TEMPERATURE:  Current - Temp: 97.6 °F (36.4 °C); Max - Temp  Av.1 °F (36.7 °C)  Min: 97.6 °F (36.4 °C)  Max: 98.6 °F (37 °C)  RESPIRATIONS RANGE: Resp  Av  Min: 18  Max: 20  PULSE RANGE: Pulse  Av  Min: 89  Max: 111  BLOOD PRESSURE RANGE:  Systolic (46LDP), BWH:635 , Min:103 , HNB:306   ; Diastolic (21DRK), GQE:82, Min:73, Max:82      Mental Status Examination:    Appearance: Appropriately groomed and in hospital attire. Made good eye contact. Behavior: Calm, cooperative. Gait unremarkable. Speech: Normal in tone, volume, and quality. No slurring, dysarthria or pressured speech noted. Mood: \"great\"   Affect: Mood congruent. Range is full. Thought Process: Appears linear and goal oriented. Thought Content: Patient does not have any current active suicidal and homicidal ideations. No overt delusions or paranoia appreciated. Perceptions: Seems patient does not have any auditory or visual hallucinations at present time. Patient did not appear to be responding to internal stimuli. No overt psychosis.    Orientation: to release capsule 75 mg, 75 mg, Oral, Daily  magnesium hydroxide (MILK OF MAGNESIA) 400 MG/5ML suspension 30 mL, 30 mL, Oral, Daily PRN  albuterol (PROVENTIL) nebulizer solution 2.5 mg, 2.5 mg, Nebulization, Q4H PRN  amoxicillin-clavulanate (AUGMENTIN) 875-125 MG per tablet 1 tablet, 1 tablet, Oral, BID  ondansetron (ZOFRAN-ODT) disintegrating tablet 4 mg, 4 mg, Oral, Q8H PRN  nicotine (NICODERM CQ) 21 MG/24HR 1 patch, 1 patch, Transdermal, Daily  potassium chloride (KLOR-CON M) extended release tablet 40 mEq, 40 mEq, Oral, Daily  cloNIDine (CATAPRES) tablet 0.2 mg, 0.2 mg, Oral, Q6H PRN    ASSESSMENT AND PLAN    DSM 5 Diagnosis:  Major depressive disorder, in remission  Opioid use disorder, in remission, on MAT with Methadone  Overdose of prescribed Methadone    Plan:     1. Psychiatric Medications: Continue current psychotropic medications as recommended. Will increase dose of Trazodone to 100 mg for depression and insomnia. The risks, benefits, side effects, indications, contraindications, alternatives and adverse effects of the medications have been discussed with patient. 2. Medical Issues:  Continue medical monitoring by Dr. Delmi Francis and associates. 3. Disposition:    Her 72 hour hold will be completed tomorrow and I think that Ms Valerio Alvarado has an adequate understanding of her condition and she will sign herself in voluntarily.        Amount of time spent with patient:  35 minutes with greater than 50% of the time spent in counseling and collaboration of care

## 2018-10-03 NOTE — PLAN OF CARE
Problem: Altered Mood, Depressive Behavior:  Goal: Able to verbalize acceptance of life and situations over which he or she has no control  Able to verbalize acceptance of life and situations over which he or she has no control   Outcome: Ongoing                                                                      Group Therapy Note    Date: 10/3/2018  Start Time: 1100  End Time:  1200  Number of Participants: 9    Type of Group: Cognitive Skills    Wellness Binder Information  Module Name:  Stress  Session Number:  1    Patient's Goal:  To improve knowledge of effective stress management techniques    Notes:  Pt demonstrated improved knowledge of effective stress management techniques by actively participating in group discussion.     Status After Intervention:  Unchanged    Participation Level: Interactive    Participation Quality: Attentive      Speech:  normal      Thought Process/Content: Logical      Affective Functioning: Congruent      Mood: anxious and depressed      Level of consciousness:  Alert and Oriented x4      Response to Learning: Able to verbalize current knowledge/experience, Able to verbalize/acknowledge new learning and Progressing to goal      Endings: None Reported    Modes of Intervention: Education      Discipline Responsible: Psychoeducational Specialist      Signature:  Forest Levine

## 2018-10-03 NOTE — PROGRESS NOTES
Patient is calm and cooperative with staff and peers. Patient has normal appetite, social at times, participated in group, med compliant, ADLs done earlier. Patient rates depression 2/10, denies anxiety, SI, HI, and AVH. Will continue to monitor.

## 2018-10-03 NOTE — PROGRESS NOTES
Group Therapy Note    Date: 10/3/2018  Start Time: 1600  End Time:  1630  Number of Participants: 11    Type of Group: Healthy Living/Wellness    Wellness Binder Information  Module Name:  Managing your mental health    Status After Intervention:  Improved     Participation Level: Active Listener and Interactive    Participation Quality: Appropriate, Attentive and Sharing      Speech:  normal      Thought Process/Content: Logical  Linear      Affective Functioning: Congruent      Mood: anxious and depressed      Level of consciousness:  Alert, Oriented x4 and Attentive      Response to Learning: Progressing to goal      Endings: None Reported    Modes of Intervention: Socialization      Discipline Responsible: Registered Nurse      Signature:   Carlos Vela RN

## 2018-10-04 VITALS
BODY MASS INDEX: 36.32 KG/M2 | SYSTOLIC BLOOD PRESSURE: 117 MMHG | WEIGHT: 205 LBS | RESPIRATION RATE: 18 BRPM | DIASTOLIC BLOOD PRESSURE: 60 MMHG | TEMPERATURE: 97.3 F | OXYGEN SATURATION: 99 % | HEIGHT: 63 IN | HEART RATE: 96 BPM

## 2018-10-04 LAB
BLOOD CULTURE, ROUTINE: NORMAL
CULTURE, BLOOD 2: NORMAL

## 2018-10-04 PROCEDURE — 6370000000 HC RX 637 (ALT 250 FOR IP): Performed by: PSYCHIATRY & NEUROLOGY

## 2018-10-04 PROCEDURE — 5130000000 HC BRIDGE APPOINTMENT

## 2018-10-04 PROCEDURE — 99239 HOSP IP/OBS DSCHRG MGMT >30: CPT | Performed by: PSYCHIATRY & NEUROLOGY

## 2018-10-04 PROCEDURE — 6360000002 HC RX W HCPCS: Performed by: INTERNAL MEDICINE

## 2018-10-04 PROCEDURE — 6370000000 HC RX 637 (ALT 250 FOR IP): Performed by: INTERNAL MEDICINE

## 2018-10-04 RX ORDER — NICOTINE 21 MG/24HR
1 PATCH, TRANSDERMAL 24 HOURS TRANSDERMAL DAILY
Qty: 30 PATCH | Refills: 3 | Status: SHIPPED | OUTPATIENT
Start: 2018-10-05 | End: 2019-01-08

## 2018-10-04 RX ORDER — TRAZODONE HYDROCHLORIDE 100 MG/1
100 TABLET ORAL NIGHTLY
Qty: 60 TABLET | Refills: 0 | Status: SHIPPED | OUTPATIENT
Start: 2018-10-04 | End: 2018-10-26 | Stop reason: SDUPTHER

## 2018-10-04 RX ORDER — NALOXONE HYDROCHLORIDE 4 MG/.1ML
1 SPRAY NASAL PRN
Qty: 2 SPRAY | Refills: 3 | Status: SHIPPED | OUTPATIENT
Start: 2018-10-04 | End: 2019-01-08

## 2018-10-04 RX ADMIN — VENLAFAXINE HYDROCHLORIDE 37.5 MG: 37.5 CAPSULE, EXTENDED RELEASE ORAL at 08:07

## 2018-10-04 RX ADMIN — ONDANSETRON 4 MG: 4 TABLET, ORALLY DISINTEGRATING ORAL at 08:11

## 2018-10-04 RX ADMIN — POTASSIUM CHLORIDE 40 MEQ: 20 TABLET, EXTENDED RELEASE ORAL at 08:07

## 2018-10-04 RX ADMIN — VENLAFAXINE HYDROCHLORIDE 75 MG: 75 CAPSULE, EXTENDED RELEASE ORAL at 08:08

## 2018-10-04 RX ADMIN — METOPROLOL SUCCINATE 50 MG: 50 TABLET, EXTENDED RELEASE ORAL at 08:07

## 2018-10-04 RX ADMIN — GABAPENTIN 800 MG: 400 CAPSULE ORAL at 08:07

## 2018-10-04 RX ADMIN — PANTOPRAZOLE SODIUM 40 MG: 40 TABLET, DELAYED RELEASE ORAL at 08:07

## 2018-10-04 RX ADMIN — ATORVASTATIN CALCIUM 20 MG: 10 TABLET, FILM COATED ORAL at 08:07

## 2018-10-04 RX ADMIN — AMOXICILLIN AND CLAVULANATE POTASSIUM 1 TABLET: 875; 125 TABLET, FILM COATED ORAL at 08:07

## 2018-10-04 RX ADMIN — METHADONE HYDROCHLORIDE 80 MG: 10 TABLET ORAL at 08:07

## 2018-10-04 RX ADMIN — GABAPENTIN 800 MG: 400 CAPSULE ORAL at 12:34

## 2018-10-04 ASSESSMENT — PAIN SCALES - GENERAL: PAINLEVEL_OUTOF10: 7

## 2018-10-04 NOTE — DISCHARGE SUMMARY
motivation. She reported mild withdrawal symptoms from opioids - poor quality of sleep, nausea and abdominal cramps. She denies any current or recent suicidal or homicidal ideations or plans. Denies any auditory or visual hallucinations. Hospital Course:   Patient was admitted to the medical services secondary to OD of prescribed Methadone. When she was stabilized, she was transferred to psychiatry and put on 72 hours hold due to possible suicidal attempt. Labs were reviewed and physical exam was completed by Dr. Paradise Yeh and associates. Home medications were reconciled. PACO was obtained and reviewed. Medication changes were made and patient tolerated well with no side effects. During the hospital stay patient was started on Trazodone PRN for insomnia, dose of Trazodone was titrated to beneficial effect, patient responded well to 200 mg of Trazodone at bedtime. Patient attended and participated in groups. The patient did interact well with other patients and staff on the unit. Behaviorally she was not a problem. Patient was compliant with her medications. Patient was sleeping through the night. This patient is not suicidal, homicidal or psychotic at discharge. She does not present a danger to self or others. With the above mentioned medications changes as well as psychotherapeutic interventions, the patient reported considerable improvement in her condition. On 10/04/18 it was therefore decided to discharge the patient, as it was felt that she received maximal benefit from her hospitalization.         Number of antipsychotic medication prescribed at discharge: NONE  IF MORE THAN ONE IS USED: NA    History of greater than 3 failed multiple monotherapy trials: NA  Monotherapy taper plan/ cross taper in progress: NA  Augmentation of Clozapine: NA    Referral to addiction treatment: NA    Prescription for Alcohol or Drug Disorder Medication: NA    Prescription for Tobacco Cessation medication: Yes, Nicotine

## 2018-10-04 NOTE — PROGRESS NOTES
Discharge Note     Pt discharging on this date. Pt denies SI, HI, and AVH at this time. Pt reports improvement in behavior and is leaving unit in overall good condition. SW and pt discussed pt's follow up appointments and importance of attending appointments as scheduled, pt voiced understanding and agreement. Pt and SW also discussed pt safety plan and pt able to verbally identify: warning signs, coping strategies, places and people that help make the pt feel better/distract negative thoughts, friends/family/agencies/professionals the pt can reach out to in a crisis, and something that is important to the pt/worth living for. Pt provided the national suicide prevention hotline number (0-509-059-949-991-7622) as well as local community behavioral health [facility]) crisis number for emergencies (1-495-427-680-047-2681). Pt to follow up with:  [address] ([address]) on 10__/09__/18 @ 8:00 AM/. Patient will follow up with Dr. Ann Cobb at [facility] for medication management, patient will be seen on 10__/12__/18 @ 8:00 AM/ for the med management appt. Referral to out patient tobacco cessation counseling treatment:    Patient refused tobacco cessation counseling    SW offered to assist pt with transportation, pt reports that she has transportation.

## 2018-10-05 DIAGNOSIS — G47.09 OTHER INSOMNIA: ICD-10-CM

## 2018-10-05 DIAGNOSIS — F33.41 RECURRENT MAJOR DEPRESSIVE DISORDER, IN PARTIAL REMISSION (HCC): ICD-10-CM

## 2018-10-05 DIAGNOSIS — F41.8 SITUATIONAL ANXIETY: ICD-10-CM

## 2018-10-05 RX ORDER — QUETIAPINE FUMARATE 50 MG/1
50 TABLET, FILM COATED ORAL EVERY EVENING
Qty: 30 TABLET | Refills: 11 | Status: SHIPPED | OUTPATIENT
Start: 2018-10-05 | End: 2019-01-01 | Stop reason: SDUPTHER

## 2018-10-05 RX ORDER — METOPROLOL SUCCINATE 50 MG/1
50 TABLET, EXTENDED RELEASE ORAL DAILY
Qty: 30 TABLET | Refills: 11 | Status: SHIPPED | OUTPATIENT
Start: 2018-10-05 | End: 2019-01-01 | Stop reason: SDUPTHER

## 2018-10-10 DIAGNOSIS — F11.21 OPIOID DEPENDENCE IN REMISSION (HCC): ICD-10-CM

## 2018-10-10 RX ORDER — ONDANSETRON 4 MG/1
4 TABLET, ORALLY DISINTEGRATING ORAL EVERY 8 HOURS PRN
Qty: 20 TABLET | Refills: 0 | Status: SHIPPED | OUTPATIENT
Start: 2018-10-10 | End: 2018-10-25 | Stop reason: SDUPTHER

## 2018-10-25 ENCOUNTER — OFFICE VISIT (OUTPATIENT)
Dept: PRIMARY CARE CLINIC | Age: 38
End: 2018-10-25
Payer: MEDICAID

## 2018-10-25 VITALS
WEIGHT: 193.75 LBS | HEART RATE: 73 BPM | OXYGEN SATURATION: 97 % | TEMPERATURE: 98.1 F | HEIGHT: 64 IN | SYSTOLIC BLOOD PRESSURE: 126 MMHG | BODY MASS INDEX: 33.08 KG/M2 | RESPIRATION RATE: 16 BRPM | DIASTOLIC BLOOD PRESSURE: 80 MMHG

## 2018-10-25 DIAGNOSIS — F11.21 OPIOID DEPENDENCE IN REMISSION (HCC): ICD-10-CM

## 2018-10-25 DIAGNOSIS — G47.01 INSOMNIA DUE TO MEDICAL CONDITION: ICD-10-CM

## 2018-10-25 DIAGNOSIS — J18.9 COMMUNITY ACQUIRED PNEUMONIA, UNSPECIFIED LATERALITY: Primary | ICD-10-CM

## 2018-10-25 DIAGNOSIS — F32.9 REACTIVE DEPRESSION: ICD-10-CM

## 2018-10-25 DIAGNOSIS — F11.20 PATIENT ON METHADONE MAINTENANCE THERAPY (HCC): ICD-10-CM

## 2018-10-25 PROCEDURE — G8484 FLU IMMUNIZE NO ADMIN: HCPCS | Performed by: NURSE PRACTITIONER

## 2018-10-25 PROCEDURE — 99213 OFFICE O/P EST LOW 20 MIN: CPT | Performed by: NURSE PRACTITIONER

## 2018-10-25 PROCEDURE — 1111F DSCHRG MED/CURRENT MED MERGE: CPT | Performed by: NURSE PRACTITIONER

## 2018-10-25 PROCEDURE — G8417 CALC BMI ABV UP PARAM F/U: HCPCS | Performed by: NURSE PRACTITIONER

## 2018-10-25 PROCEDURE — G8427 DOCREV CUR MEDS BY ELIG CLIN: HCPCS | Performed by: NURSE PRACTITIONER

## 2018-10-25 PROCEDURE — 4004F PT TOBACCO SCREEN RCVD TLK: CPT | Performed by: NURSE PRACTITIONER

## 2018-10-25 RX ORDER — ONDANSETRON 4 MG/1
4 TABLET, ORALLY DISINTEGRATING ORAL EVERY 8 HOURS PRN
Qty: 20 TABLET | Refills: 0 | Status: ON HOLD | OUTPATIENT
Start: 2018-10-25 | End: 2019-01-01 | Stop reason: HOSPADM

## 2018-10-25 ASSESSMENT — ENCOUNTER SYMPTOMS
TROUBLE SWALLOWING: 0
ABDOMINAL PAIN: 0
WHEEZING: 0
SHORTNESS OF BREATH: 0
EYES NEGATIVE: 1
SORE THROAT: 0
COUGH: 0
BACK PAIN: 1
NAUSEA: 0

## 2018-10-25 NOTE — PROGRESS NOTES
[facility]  [place], [address]  Phone [phone]   Fax [phone]      OFFICE VISIT: 10/25/2018    Eduardo Carney- : 1980      Reason For Visit:  Kye Kilgore is a 43 y.o. femalewho is here for Other (Pt is here for follow up on pneumonia. She is much better,needs rfs on zofran and trazodone.)         Health Maintenance         HPI      Patient is here for hospital follow up  She was admitted unresponsive  And was thought to have taken to much methadone     Then was sent to psych floor  10/1-10/4  Depression   And opioid overdose undertermined intent  She reports she was not trying to hurt herself  She had an change in dose down from 120mg to 80mg  Cause of her breathing  So is on 75mg she reports it is helping      She was admitted   With CAP   And had rx and finished the antibiotics  She is doing better at present  No more fevers  And feeling better    She denies any suicidal intentions  I had see patient two days before  And tried treatment at home  She did get her neurontin that day         height is 5' 4\" (1.626 m) and weight is 193 lb 12 oz (87.9 kg). Her temporal temperature is 98.1 °F (36.7 °C). Her blood pressure is 126/80 and her pulse is 73. Her respiration is 16 and oxygen saturation is 97%. Body mass index is 33.26 kg/m². Results for orders placed or performed during the hospital encounter of 18   CULTURE BLOOD #1   Result Value Ref Range    Blood Culture, Routine No growth after 5 days of incubation. CULTURE BLOOD #2   Result Value Ref Range    Culture, Blood 2 No growth after 5 days of incubation.     Acetaminophen Level   Result Value Ref Range    Acetaminophen Level <15 ug/mL   CBC Auto Differential   Result Value Ref Range    WBC 17.0 (H) 4.8 - 10.8 K/uL    RBC 3.34 (L) 4.20 - 5.40 M/uL    Hemoglobin 10.3 (L) 12.0 - 16.0 g/dL    Hematocrit 32.9 (L) 37.0 - 47.0 %    MCV 98.5 81.0 - 99.0 fL    MCH 30.8 27.0 - 31.0 pg    MCHC 31.3 (L) 33.0 - 37.0 g/dL    RDW 14.8 (H) 11.5 Leukocyte Esterase, Urine Negative Negative    Urine Reflex to Culture Not Indicated    Pregnancy, Urine   Result Value Ref Range    HCG(Urine) Pregnancy Test Negative    Microscopic Urinalysis   Result Value Ref Range    Casts 0-1 Hyaline (A) /LPF    Mucus, UA Rare (A) /LPF    Epi Cells 3-5 /HPF    Bacteria, UA trace /HPF   Comprehensive Metabolic Panel w/ Reflex to MG   Result Value Ref Range    Sodium 142 136 - 145 mmol/L    Potassium reflex Magnesium 2.8 (LL) 3.5 - 5.0 mmol/L    Chloride 99 98 - 111 mmol/L    CO2 30 (H) 22 - 29 mmol/L    Anion Gap 13 7 - 19 mmol/L    Glucose 101 74 - 109 mg/dL    BUN 7 6 - 20 mg/dL    CREATININE 0.6 0.5 - 0.9 mg/dL    GFR Non-African American >60 >60    Calcium 8.4 (L) 8.6 - 10.0 mg/dL    Total Protein 6.3 (L) 6.6 - 8.7 g/dL    Alb 2.5 (L) 3.5 - 5.2 g/dL    Total Bilirubin 0.4 0.2 - 1.2 mg/dL    Alkaline Phosphatase 64 35 - 104 U/L    ALT 20 5 - 33 U/L    AST 17 5 - 32 U/L   CBC auto differential   Result Value Ref Range    WBC 12.0 (H) 4.8 - 10.8 K/uL    RBC 3.56 (L) 4.20 - 5.40 M/uL    Hemoglobin 10.6 (L) 12.0 - 16.0 g/dL    Hematocrit 36.4 (L) 37.0 - 47.0 %    .2 (H) 81.0 - 99.0 fL    MCH 29.8 27.0 - 31.0 pg    MCHC 29.1 (L) 33.0 - 37.0 g/dL    RDW 14.6 (H) 11.5 - 14.5 %    Platelets 193 (H) 247 - 400 K/uL    MPV 9.1 (L) 9.4 - 12.3 fL    Neutrophils % 70.1 (H) 50.0 - 65.0 %    Lymphocytes % 21.6 20.0 - 40.0 %    Monocytes % 5.8 0.0 - 10.0 %    Eosinophils % 1.3 0.0 - 5.0 %    Basophils % 0.8 0.0 - 1.0 %    Neutrophils # 8.4 (H) 1.5 - 7.5 K/uL    Lymphocytes # 2.6 1.1 - 4.5 K/uL    Monocytes # 0.70 0.00 - 0.90 K/uL    Eosinophils # 0.20 0.00 - 0.60 K/uL    Basophils # 0.10 0.00 - 0.20 K/uL   Magnesium   Result Value Ref Range    Magnesium 2.1 1.6 - 2.6 mg/dL   Basic Metabolic Panel   Result Value Ref Range    Sodium 142 136 - 145 mmol/L    Potassium 3.8 3.5 - 5.0 mmol/L    Chloride 99 98 - 111 mmol/L    CO2 37 (H) 22 - 29 mmol/L    Anion Gap 6 (L) 7 - 19 mmol/L Glucose 78 74 - 109 mg/dL    BUN 5 (L) 6 - 20 mg/dL    CREATININE 0.5 0.5 - 0.9 mg/dL    GFR Non-African American >60 >60    Calcium 8.9 8.6 - 10.0 mg/dL   CBC Auto Differential   Result Value Ref Range    WBC 10.1 4.8 - 10.8 K/uL    RBC 3.58 (L) 4.20 - 5.40 M/uL    Hemoglobin 10.8 (L) 12.0 - 16.0 g/dL    Hematocrit 36.0 (L) 37.0 - 47.0 %    .6 (H) 81.0 - 99.0 fL    MCH 30.2 27.0 - 31.0 pg    MCHC 30.0 (L) 33.0 - 37.0 g/dL    RDW 14.2 11.5 - 14.5 %    Platelets 613 (H) 160 - 400 K/uL    MPV 9.7 9.4 - 12.3 fL    Neutrophils % 78.5 (H) 50.0 - 65.0 %    Lymphocytes % 17.4 (L) 20.0 - 40.0 %    Monocytes % 3.1 0.0 - 10.0 %    Eosinophils % 0.1 0.0 - 5.0 %    Basophils % 0.6 0.0 - 1.0 %    Neutrophils # 8.0 (H) 1.5 - 7.5 K/uL    Lymphocytes # 1.8 1.1 - 4.5 K/uL    Monocytes # 0.30 0.00 - 0.90 K/uL    Eosinophils # 0.00 0.00 - 0.60 K/uL    Basophils # 0.10 0.00 - 0.20 K/uL   Comprehensive Metabolic Panel   Result Value Ref Range    Sodium 141 136 - 145 mmol/L    Potassium 4.5 3.5 - 5.0 mmol/L    Chloride 101 98 - 111 mmol/L    CO2 29 22 - 29 mmol/L    Anion Gap 11 7 - 19 mmol/L    Glucose 109 74 - 109 mg/dL    BUN 6 6 - 20 mg/dL    CREATININE 0.5 0.5 - 0.9 mg/dL    GFR Non-African American >60 >60    Calcium 8.9 8.6 - 10.0 mg/dL    Total Protein 6.8 6.6 - 8.7 g/dL    Alb 2.7 (L) 3.5 - 5.2 g/dL    Total Bilirubin 0.5 0.2 - 1.2 mg/dL    Alkaline Phosphatase 67 35 - 104 U/L    ALT 17 5 - 33 U/L    AST 15 5 - 32 U/L   EKG 12 Lead   Result Value Ref Range    P-R Interval 126 ms    QRS Duration 84 ms    Q-T Interval 364 ms    QTc Calculation (Bazett) 448 ms    P Axis 45 degrees    T Axis 13 degrees   EKG 12 Lead   Result Value Ref Range    P-R Interval 130 ms    QRS Duration 86 ms    Q-T Interval 406 ms    QTc Calculation (Bazett) 427 ms    P Axis 45 degrees    T Axis -2 degrees       I have reviewed the following with the Ms. Ball Marcosho   Lab Review   Admission on 09/29/2018, Discharged on 10/01/2018   Component Date Value Basophils # 09/27/2018 0.10     Sodium 09/27/2018 141     Potassium 09/27/2018 3.0*    Chloride 09/27/2018 95*    CO2 09/27/2018 35*    Anion Gap 09/27/2018 11     Glucose 09/27/2018 96     BUN 09/27/2018 7     CREATININE 09/27/2018 0.8     GFR Non- 09/27/2018 >60     Calcium 09/27/2018 8.6     Total Protein 09/27/2018 6.9     Alb 09/27/2018 2.9*    Total Bilirubin 09/27/2018 0.5     Alkaline Phosphatase 09/27/2018 69     ALT 09/27/2018 31     AST 09/27/2018 41*     Copies of these are in the chart. Prior to Visit Medications    Medication Sig Taking? Authorizing Provider   ondansetron (ZOFRAN ODT) 4 MG disintegrating tablet Take 1 tablet by mouth every 8 hours as needed for Nausea or Vomiting Yes RONALD Bruce   metoprolol succinate (TOPROL XL) 50 MG extended release tablet Take 1 tablet by mouth daily TAKE ONE TABLET BY MOUTH DAILY Yes RONALD Garcia   naloxone 4 MG/0.1ML LIQD nasal spray 1 spray by Nasal route as needed for Opioid Reversal Yes Sidra Kemp MD   traZODone (DESYREL) 100 MG tablet Take 1 tablet by mouth nightly May take additional dose of 100 mg in 30-40 minutes if still awake Yes Sidra Kemp MD   Nebulizers Cristian Croissant COMPRESS PED NEBULIZER) MISC 1 applicator by Does not apply route every 4 hours as needed (soa) Yes RONALD Garcia   gabapentin (NEURONTIN) 800 MG tablet Take 1 tablet by mouth 4 times daily for 30 days. RONALD Cazares   albuterol (ACCUNEB) 1.25 MG/3ML nebulizer solution Inhale 3 mLs into the lungs every 6 hours as needed for Wheezing Yes RONALD Garcia   pantoprazole (PROTONIX) 40 MG tablet TAKE ONE TABLET BY MOUTH DAILY Yes RONALD Garcia   venlafaxine (EFFEXOR XR) 37.5 MG extended release capsule Take 1 capsule by mouth daily Yes RONALD Garcia   venlafaxine (EFFEXOR XR) 75 MG extended release capsule Take 1 capsule by mouth daily Yes RONALD Garcia

## 2018-10-26 RX ORDER — TRAZODONE HYDROCHLORIDE 100 MG/1
TABLET ORAL
Qty: 60 TABLET | Refills: 5 | Status: SHIPPED | OUTPATIENT
Start: 2018-10-26 | End: 2019-01-08

## 2018-11-23 DIAGNOSIS — F41.8 SITUATIONAL ANXIETY: ICD-10-CM

## 2018-11-23 DIAGNOSIS — M54.41 CHRONIC BILATERAL LOW BACK PAIN WITH RIGHT-SIDED SCIATICA: ICD-10-CM

## 2018-11-23 DIAGNOSIS — F33.41 RECURRENT MAJOR DEPRESSIVE DISORDER, IN PARTIAL REMISSION (HCC): ICD-10-CM

## 2018-11-23 DIAGNOSIS — G89.29 CHRONIC BILATERAL LOW BACK PAIN WITH RIGHT-SIDED SCIATICA: ICD-10-CM

## 2018-11-23 DIAGNOSIS — M79.89 BILATERAL SWELLING OF FEET: ICD-10-CM

## 2018-11-26 RX ORDER — VENLAFAXINE HYDROCHLORIDE 75 MG/1
75 CAPSULE, EXTENDED RELEASE ORAL DAILY
Qty: 90 CAPSULE | Refills: 3 | Status: ON HOLD | OUTPATIENT
Start: 2018-11-26 | End: 2019-01-01 | Stop reason: HOSPADM

## 2018-11-26 RX ORDER — FUROSEMIDE 40 MG/1
40 TABLET ORAL DAILY
Qty: 90 TABLET | Refills: 3 | Status: SHIPPED | OUTPATIENT
Start: 2018-11-26 | End: 2019-01-01 | Stop reason: SDUPTHER

## 2018-11-26 RX ORDER — MELOXICAM 15 MG/1
15 TABLET ORAL DAILY
Qty: 90 TABLET | Refills: 3 | Status: SHIPPED | OUTPATIENT
Start: 2018-11-26 | End: 2019-01-08

## 2018-12-21 ENCOUNTER — TELEPHONE (OUTPATIENT)
Dept: PRIMARY CARE CLINIC | Age: 38
End: 2018-12-21

## 2018-12-21 RX ORDER — ONDANSETRON 8 MG/1
8 TABLET, ORALLY DISINTEGRATING ORAL EVERY 8 HOURS PRN
Qty: 20 TABLET | Refills: 0 | Status: SHIPPED | OUTPATIENT
Start: 2018-12-21 | End: 2019-01-01 | Stop reason: SDUPTHER

## 2019-01-01 ENCOUNTER — APPOINTMENT (OUTPATIENT)
Dept: GENERAL RADIOLOGY | Age: 39
End: 2019-01-01
Payer: MEDICAID

## 2019-01-01 ENCOUNTER — HOSPITAL ENCOUNTER (INPATIENT)
Age: 39
LOS: 2 days | Discharge: HOME | DRG: 885 | End: 2019-01-25
Attending: EMERGENCY MEDICINE | Admitting: PSYCHIATRY & NEUROLOGY
Payer: MEDICAID

## 2019-01-01 ENCOUNTER — APPOINTMENT (OUTPATIENT)
Dept: GENERAL RADIOLOGY | Age: 39
DRG: 917 | End: 2019-01-01
Payer: MEDICAID

## 2019-01-01 ENCOUNTER — OFFICE VISIT (OUTPATIENT)
Dept: PRIMARY CARE CLINIC | Age: 39
End: 2019-01-01
Payer: MEDICAID

## 2019-01-01 ENCOUNTER — HOSPITAL ENCOUNTER (OUTPATIENT)
Dept: GENERAL RADIOLOGY | Age: 39
Discharge: HOME | End: 2019-03-11
Payer: MEDICAID

## 2019-01-01 ENCOUNTER — HOSPITAL ENCOUNTER (INPATIENT)
Age: 39
LOS: 1 days | Discharge: HOME | DRG: 885 | End: 2019-12-24
Attending: EMERGENCY MEDICINE | Admitting: PSYCHIATRY & NEUROLOGY
Payer: MEDICAID

## 2019-01-01 ENCOUNTER — HOSPITAL ENCOUNTER (EMERGENCY)
Age: 39
Discharge: HOME | End: 2019-12-15
Attending: EMERGENCY MEDICINE
Payer: MEDICAID

## 2019-01-01 ENCOUNTER — TELEPHONE (OUTPATIENT)
Dept: NEUROSURGERY | Age: 39
End: 2019-01-01

## 2019-01-01 ENCOUNTER — TELEPHONE (OUTPATIENT)
Dept: PRIMARY CARE CLINIC | Age: 39
End: 2019-01-01

## 2019-01-01 ENCOUNTER — APPOINTMENT (OUTPATIENT)
Dept: GENERAL RADIOLOGY | Age: 39
DRG: 494 | End: 2019-01-01
Payer: MEDICAID

## 2019-01-01 ENCOUNTER — OFFICE VISIT (OUTPATIENT)
Dept: PSYCHIATRY | Age: 39
End: 2019-01-01
Payer: MEDICAID

## 2019-01-01 ENCOUNTER — HOSPITAL ENCOUNTER (INPATIENT)
Age: 39
LOS: 2 days | Discharge: HOME | DRG: 494 | End: 2019-11-26
Attending: EMERGENCY MEDICINE | Admitting: INTERNAL MEDICINE
Payer: MEDICAID

## 2019-01-01 ENCOUNTER — HOSPITAL ENCOUNTER (EMERGENCY)
Age: 39
Discharge: HOME | End: 2019-08-04
Attending: EMERGENCY MEDICINE
Payer: MEDICAID

## 2019-01-01 ENCOUNTER — HOSPITAL ENCOUNTER (EMERGENCY)
Age: 39
Discharge: HOME | End: 2019-12-16
Attending: EMERGENCY MEDICINE
Payer: MEDICAID

## 2019-01-01 ENCOUNTER — ANESTHESIA EVENT (OUTPATIENT)
Dept: OPERATING ROOM | Age: 39
DRG: 494 | End: 2019-01-01
Payer: MEDICAID

## 2019-01-01 ENCOUNTER — HOSPITAL ENCOUNTER (INPATIENT)
Age: 39
LOS: 1 days | Discharge: LEFT BEFORE DISCHARGE | DRG: 917 | End: 2019-12-18
Attending: FAMILY MEDICINE | Admitting: INTERNAL MEDICINE
Payer: MEDICAID

## 2019-01-01 ENCOUNTER — ANESTHESIA (OUTPATIENT)
Dept: OPERATING ROOM | Age: 39
DRG: 494 | End: 2019-01-01
Payer: MEDICAID

## 2019-01-01 VITALS
HEIGHT: 63 IN | WEIGHT: 201.75 LBS | DIASTOLIC BLOOD PRESSURE: 86 MMHG | SYSTOLIC BLOOD PRESSURE: 132 MMHG | HEART RATE: 89 BPM | OXYGEN SATURATION: 98 % | BODY MASS INDEX: 35.75 KG/M2 | TEMPERATURE: 97.6 F

## 2019-01-01 VITALS
TEMPERATURE: 98 F | RESPIRATION RATE: 10 BRPM | DIASTOLIC BLOOD PRESSURE: 54 MMHG | OXYGEN SATURATION: 100 % | SYSTOLIC BLOOD PRESSURE: 99 MMHG

## 2019-01-01 VITALS
RESPIRATION RATE: 16 BRPM | OXYGEN SATURATION: 97 % | TEMPERATURE: 98.3 F | WEIGHT: 200 LBS | HEIGHT: 61 IN | DIASTOLIC BLOOD PRESSURE: 66 MMHG | SYSTOLIC BLOOD PRESSURE: 124 MMHG | BODY MASS INDEX: 37.76 KG/M2 | HEART RATE: 71 BPM

## 2019-01-01 VITALS
RESPIRATION RATE: 18 BRPM | DIASTOLIC BLOOD PRESSURE: 69 MMHG | HEIGHT: 62 IN | OXYGEN SATURATION: 92 % | SYSTOLIC BLOOD PRESSURE: 102 MMHG | WEIGHT: 190 LBS | BODY MASS INDEX: 34.96 KG/M2 | HEART RATE: 93 BPM | TEMPERATURE: 97.1 F

## 2019-01-01 VITALS
HEIGHT: 61 IN | TEMPERATURE: 97.4 F | HEART RATE: 78 BPM | BODY MASS INDEX: 37.76 KG/M2 | WEIGHT: 200 LBS | OXYGEN SATURATION: 90 % | SYSTOLIC BLOOD PRESSURE: 90 MMHG | RESPIRATION RATE: 16 BRPM | DIASTOLIC BLOOD PRESSURE: 59 MMHG

## 2019-01-01 VITALS
RESPIRATION RATE: 16 BRPM | DIASTOLIC BLOOD PRESSURE: 100 MMHG | HEART RATE: 97 BPM | WEIGHT: 180 LBS | HEIGHT: 61 IN | BODY MASS INDEX: 33.99 KG/M2 | OXYGEN SATURATION: 97 % | TEMPERATURE: 96.3 F | SYSTOLIC BLOOD PRESSURE: 128 MMHG

## 2019-01-01 VITALS
OXYGEN SATURATION: 96 % | HEART RATE: 63 BPM | WEIGHT: 194 LBS | SYSTOLIC BLOOD PRESSURE: 104 MMHG | TEMPERATURE: 97.1 F | BODY MASS INDEX: 34.37 KG/M2 | RESPIRATION RATE: 20 BRPM | DIASTOLIC BLOOD PRESSURE: 59 MMHG

## 2019-01-01 VITALS
SYSTOLIC BLOOD PRESSURE: 124 MMHG | OXYGEN SATURATION: 96 % | BODY MASS INDEX: 33.99 KG/M2 | RESPIRATION RATE: 20 BRPM | HEART RATE: 99 BPM | HEIGHT: 61 IN | WEIGHT: 180 LBS | DIASTOLIC BLOOD PRESSURE: 79 MMHG | TEMPERATURE: 98 F

## 2019-01-01 VITALS
SYSTOLIC BLOOD PRESSURE: 122 MMHG | WEIGHT: 202.13 LBS | HEART RATE: 70 BPM | TEMPERATURE: 97.5 F | DIASTOLIC BLOOD PRESSURE: 88 MMHG | BODY MASS INDEX: 38.16 KG/M2 | HEIGHT: 61 IN | OXYGEN SATURATION: 97 %

## 2019-01-01 VITALS
HEIGHT: 62 IN | SYSTOLIC BLOOD PRESSURE: 126 MMHG | OXYGEN SATURATION: 94 % | TEMPERATURE: 98.6 F | HEART RATE: 89 BPM | DIASTOLIC BLOOD PRESSURE: 88 MMHG | WEIGHT: 209.75 LBS | BODY MASS INDEX: 38.6 KG/M2

## 2019-01-01 VITALS
TEMPERATURE: 98 F | OXYGEN SATURATION: 98 % | HEIGHT: 61 IN | HEART RATE: 98 BPM | SYSTOLIC BLOOD PRESSURE: 125 MMHG | BODY MASS INDEX: 33.99 KG/M2 | DIASTOLIC BLOOD PRESSURE: 80 MMHG | RESPIRATION RATE: 16 BRPM | WEIGHT: 180 LBS

## 2019-01-01 VITALS
WEIGHT: 210.25 LBS | HEART RATE: 88 BPM | OXYGEN SATURATION: 98 % | BODY MASS INDEX: 38.69 KG/M2 | HEIGHT: 62 IN | SYSTOLIC BLOOD PRESSURE: 138 MMHG | TEMPERATURE: 97.9 F | DIASTOLIC BLOOD PRESSURE: 88 MMHG

## 2019-01-01 VITALS
HEART RATE: 60 BPM | OXYGEN SATURATION: 97 % | DIASTOLIC BLOOD PRESSURE: 80 MMHG | TEMPERATURE: 97.9 F | WEIGHT: 203.25 LBS | HEIGHT: 63 IN | BODY MASS INDEX: 36.01 KG/M2 | SYSTOLIC BLOOD PRESSURE: 128 MMHG

## 2019-01-01 VITALS
OXYGEN SATURATION: 98 % | BODY MASS INDEX: 40.33 KG/M2 | TEMPERATURE: 98.4 F | HEART RATE: 74 BPM | WEIGHT: 220.5 LBS | DIASTOLIC BLOOD PRESSURE: 86 MMHG | SYSTOLIC BLOOD PRESSURE: 124 MMHG

## 2019-01-01 DIAGNOSIS — F41.8 SITUATIONAL ANXIETY: ICD-10-CM

## 2019-01-01 DIAGNOSIS — E87.6 HYPOKALEMIA: ICD-10-CM

## 2019-01-01 DIAGNOSIS — J40 BRONCHITIS: ICD-10-CM

## 2019-01-01 DIAGNOSIS — F11.21 OPIOID DEPENDENCE IN REMISSION (HCC): ICD-10-CM

## 2019-01-01 DIAGNOSIS — R56.9 SEIZURES (HCC): Primary | ICD-10-CM

## 2019-01-01 DIAGNOSIS — F33.41 RECURRENT MAJOR DEPRESSIVE DISORDER, IN PARTIAL REMISSION (HCC): ICD-10-CM

## 2019-01-01 DIAGNOSIS — Z09 HOSPITAL DISCHARGE FOLLOW-UP: ICD-10-CM

## 2019-01-01 DIAGNOSIS — Z72.0 TOBACCO ABUSE: ICD-10-CM

## 2019-01-01 DIAGNOSIS — G89.29 CHRONIC LOW BACK PAIN, UNSPECIFIED BACK PAIN LATERALITY, WITH SCIATICA PRESENCE UNSPECIFIED: ICD-10-CM

## 2019-01-01 DIAGNOSIS — F41.9 ANXIETY: Primary | ICD-10-CM

## 2019-01-01 DIAGNOSIS — Z98.890 HISTORY OF TRACHEOSTOMY: ICD-10-CM

## 2019-01-01 DIAGNOSIS — M54.50 CHRONIC LOW BACK PAIN: ICD-10-CM

## 2019-01-01 DIAGNOSIS — J18.9 PNEUMONIA DUE TO INFECTIOUS ORGANISM, UNSPECIFIED LATERALITY, UNSPECIFIED PART OF LUNG: ICD-10-CM

## 2019-01-01 DIAGNOSIS — F11.20 PATIENT ON METHADONE MAINTENANCE THERAPY (HCC): ICD-10-CM

## 2019-01-01 DIAGNOSIS — G89.29 CHRONIC LOW BACK PAIN: ICD-10-CM

## 2019-01-01 DIAGNOSIS — Z87.81 HISTORY OF FRACTURE OF RIGHT ANKLE: ICD-10-CM

## 2019-01-01 DIAGNOSIS — R11.0 NAUSEA: ICD-10-CM

## 2019-01-01 DIAGNOSIS — R04.2 HEMOPTYSIS: Primary | ICD-10-CM

## 2019-01-01 DIAGNOSIS — M79.89 BILATERAL SWELLING OF FEET: ICD-10-CM

## 2019-01-01 DIAGNOSIS — G89.29 CHRONIC BILATERAL LOW BACK PAIN WITH RIGHT-SIDED SCIATICA: ICD-10-CM

## 2019-01-01 DIAGNOSIS — T40.601A OPIATE OVERDOSE, ACCIDENTAL OR UNINTENTIONAL, INITIAL ENCOUNTER (HCC): Primary | ICD-10-CM

## 2019-01-01 DIAGNOSIS — J18.9 PNEUMONIA DUE TO ORGANISM: ICD-10-CM

## 2019-01-01 DIAGNOSIS — M54.5 CHRONIC LOW BACK PAIN, UNSPECIFIED BACK PAIN LATERALITY, WITH SCIATICA PRESENCE UNSPECIFIED: ICD-10-CM

## 2019-01-01 DIAGNOSIS — R11.2 NON-INTRACTABLE VOMITING WITH NAUSEA, UNSPECIFIED VOMITING TYPE: Primary | ICD-10-CM

## 2019-01-01 DIAGNOSIS — Z87.81 HISTORY OF ANKLE FRACTURE: ICD-10-CM

## 2019-01-01 DIAGNOSIS — R05.9 COUGH: ICD-10-CM

## 2019-01-01 DIAGNOSIS — G47.09 OTHER INSOMNIA: ICD-10-CM

## 2019-01-01 DIAGNOSIS — M54.41 CHRONIC BILATERAL LOW BACK PAIN WITH RIGHT-SIDED SCIATICA: ICD-10-CM
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
## 2019-01-01 DIAGNOSIS — F11.93 OPIATE WITHDRAWAL (HCC): ICD-10-CM

## 2019-01-01 DIAGNOSIS — R09.02 HYPOXEMIA: ICD-10-CM

## 2019-01-01 DIAGNOSIS — R45.851 DEPRESSION WITH SUICIDAL IDEATION: Primary | ICD-10-CM

## 2019-01-01 DIAGNOSIS — R04.2 HEMOPTYSIS: ICD-10-CM

## 2019-01-01 DIAGNOSIS — R50.9 FEVER, UNSPECIFIED FEVER CAUSE: ICD-10-CM

## 2019-01-01 DIAGNOSIS — S82.851A CLOSED DISPLACED TRIMALLEOLAR FRACTURE OF RIGHT ANKLE, INITIAL ENCOUNTER: Primary | ICD-10-CM

## 2019-01-01 DIAGNOSIS — E89.40 SURGICAL MENOPAUSE: ICD-10-CM

## 2019-01-01 DIAGNOSIS — R11.2 NON-INTRACTABLE VOMITING WITH NAUSEA, UNSPECIFIED VOMITING TYPE: ICD-10-CM

## 2019-01-01 DIAGNOSIS — R52 INTRACTABLE PAIN: ICD-10-CM

## 2019-01-01 DIAGNOSIS — R56.9 SEIZURE-LIKE ACTIVITY (HCC): Primary | ICD-10-CM

## 2019-01-01 DIAGNOSIS — S82.91XA CLOSED FRACTURE OF RIGHT LOWER EXTREMITY, INITIAL ENCOUNTER: ICD-10-CM

## 2019-01-01 DIAGNOSIS — Z09 HOSPITAL DISCHARGE FOLLOW-UP: Primary | ICD-10-CM

## 2019-01-01 DIAGNOSIS — R45.851 SUICIDAL IDEATION: Primary | ICD-10-CM

## 2019-01-01 DIAGNOSIS — F32.A DEPRESSION WITH SUICIDAL IDEATION: Primary | ICD-10-CM

## 2019-01-01 DIAGNOSIS — R56.9 SEIZURE-LIKE ACTIVITY (HCC): ICD-10-CM

## 2019-01-01 DIAGNOSIS — J20.9 ACUTE BRONCHITIS, UNSPECIFIED ORGANISM: Primary | ICD-10-CM

## 2019-01-01 LAB
ALBUMIN SERPL-MCNC: 3.5 G/DL (ref 3.5–5.2)
ALBUMIN SERPL-MCNC: 3.5 G/DL (ref 3.5–5.2)
ALBUMIN SERPL-MCNC: 4 G/DL (ref 3.5–5.2)
ALBUMIN SERPL-MCNC: 4.1 G/DL (ref 3.5–5.2)
ALBUMIN SERPL-MCNC: 4.1 G/DL (ref 3.5–5.2)
ALBUMIN SERPL-MCNC: 4.4 G/DL (ref 3.5–5.2)
ALP BLD-CCNC: 63 U/L (ref 35–104)
ALP BLD-CCNC: 73 U/L (ref 35–104)
ALP BLD-CCNC: 77 U/L (ref 35–104)
ALP BLD-CCNC: 79 U/L (ref 35–104)
ALP BLD-CCNC: 80 U/L (ref 35–104)
ALP BLD-CCNC: 81 U/L (ref 35–104)
ALP BLD-CCNC: 85 U/L (ref 35–104)
ALP BLD-CCNC: 90 U/L (ref 35–104)
ALT SERPL-CCNC: 11 U/L (ref 5–33)
ALT SERPL-CCNC: 12 U/L (ref 5–33)
ALT SERPL-CCNC: 13 U/L (ref 5–33)
ALT SERPL-CCNC: 13 U/L (ref 5–33)
ALT SERPL-CCNC: 14 U/L (ref 5–33)
ALT SERPL-CCNC: 20 U/L (ref 5–33)
ALT SERPL-CCNC: 22 U/L (ref 5–33)
ALT SERPL-CCNC: 24 U/L (ref 5–33)
AMPHETAMINE SCREEN, URINE: NEGATIVE
ANION GAP SERPL CALCULATED.3IONS-SCNC: 11 MMOL/L (ref 7–19)
ANION GAP SERPL CALCULATED.3IONS-SCNC: 11 MMOL/L (ref 7–19)
ANION GAP SERPL CALCULATED.3IONS-SCNC: 12 MMOL/L (ref 7–19)
ANION GAP SERPL CALCULATED.3IONS-SCNC: 14 MMOL/L (ref 7–19)
ANION GAP SERPL CALCULATED.3IONS-SCNC: 15 MMOL/L (ref 7–19)
ANION GAP SERPL CALCULATED.3IONS-SCNC: 15 MMOL/L (ref 7–19)
ANION GAP SERPL CALCULATED.3IONS-SCNC: 16 MMOL/L (ref 7–19)
ANION GAP SERPL CALCULATED.3IONS-SCNC: 18 MMOL/L (ref 7–19)
AST SERPL-CCNC: 15 U/L (ref 5–32)
AST SERPL-CCNC: 15 U/L (ref 5–32)
AST SERPL-CCNC: 18 U/L (ref 5–32)
AST SERPL-CCNC: 18 U/L (ref 5–32)
AST SERPL-CCNC: 20 U/L (ref 5–32)
AST SERPL-CCNC: 24 U/L (ref 5–32)
AST SERPL-CCNC: 32 U/L (ref 5–32)
AST SERPL-CCNC: 41 U/L (ref 5–32)
BACTERIA: ABNORMAL /HPF
BARBITURATE SCREEN URINE: NEGATIVE
BASE EXCESS ARTERIAL: -4 MMOL/L (ref -2–2)
BASOPHILS ABSOLUTE: 0 K/UL (ref 0–0.2)
BASOPHILS ABSOLUTE: 0.1 K/UL (ref 0–0.2)
BASOPHILS RELATIVE PERCENT: 0.2 % (ref 0–1)
BASOPHILS RELATIVE PERCENT: 0.3 % (ref 0–1)
BASOPHILS RELATIVE PERCENT: 0.4 % (ref 0–1)
BASOPHILS RELATIVE PERCENT: 0.4 % (ref 0–1)
BASOPHILS RELATIVE PERCENT: 0.5 % (ref 0–1)
BASOPHILS RELATIVE PERCENT: 0.6 % (ref 0–1)
BASOPHILS RELATIVE PERCENT: 0.6 % (ref 0–1)
BASOPHILS RELATIVE PERCENT: 0.7 % (ref 0–1)
BASOPHILS RELATIVE PERCENT: 0.7 % (ref 0–1)
BENZODIAZEPINE SCREEN, URINE: NEGATIVE
BENZODIAZEPINE SCREEN, URINE: NEGATIVE
BENZODIAZEPINE SCREEN, URINE: POSITIVE
BILIRUB SERPL-MCNC: 0.3 MG/DL (ref 0.2–1.2)
BILIRUB SERPL-MCNC: 0.4 MG/DL (ref 0.2–1.2)
BILIRUB SERPL-MCNC: 0.5 MG/DL (ref 0.2–1.2)
BILIRUB SERPL-MCNC: <0.2 MG/DL (ref 0.2–1.2)
BILIRUBIN URINE: ABNORMAL
BILIRUBIN URINE: ABNORMAL
BILIRUBIN URINE: NEGATIVE
BILIRUBIN URINE: NEGATIVE
BLOOD CULTURE, ROUTINE: NORMAL
BLOOD, URINE: NEGATIVE
BUN BLDV-MCNC: 10 MG/DL (ref 6–20)
BUN BLDV-MCNC: 12 MG/DL (ref 6–20)
BUN BLDV-MCNC: 12 MG/DL (ref 6–20)
BUN BLDV-MCNC: 14 MG/DL (ref 6–20)
BUN BLDV-MCNC: 6 MG/DL (ref 6–20)
BUN BLDV-MCNC: 8 MG/DL (ref 6–20)
BUN BLDV-MCNC: 9 MG/DL (ref 6–20)
CALCIUM SERPL-MCNC: 7.9 MG/DL (ref 8.6–10)
CALCIUM SERPL-MCNC: 8.4 MG/DL (ref 8.6–10)
CALCIUM SERPL-MCNC: 8.5 MG/DL (ref 8.6–10)
CALCIUM SERPL-MCNC: 9 MG/DL (ref 8.6–10)
CALCIUM SERPL-MCNC: 9 MG/DL (ref 8.6–10)
CALCIUM SERPL-MCNC: 9.1 MG/DL (ref 8.6–10)
CALCIUM SERPL-MCNC: 9.1 MG/DL (ref 8.6–10)
CALCIUM SERPL-MCNC: 9.2 MG/DL (ref 8.6–10)
CALCIUM SERPL-MCNC: 9.3 MG/DL (ref 8.6–10)
CALCIUM SERPL-MCNC: 9.4 MG/DL (ref 8.6–10)
CANNABINOID SCREEN URINE: NEGATIVE
CANNABINOID SCREEN URINE: POSITIVE
CANNABINOID SCREEN URINE: POSITIVE
CARBOXYHEMOGLOBIN ARTERIAL: 8.6 % (ref 0–5)
CHLORIDE BLD-SCNC: 101 MMOL/L (ref 98–111)
CHLORIDE BLD-SCNC: 102 MMOL/L (ref 98–111)
CHLORIDE BLD-SCNC: 103 MMOL/L (ref 98–111)
CHLORIDE BLD-SCNC: 103 MMOL/L (ref 98–111)
CHLORIDE BLD-SCNC: 105 MMOL/L (ref 98–111)
CHLORIDE BLD-SCNC: 106 MMOL/L (ref 98–111)
CHLORIDE BLD-SCNC: 107 MMOL/L (ref 98–111)
CHLORIDE BLD-SCNC: 99 MMOL/L (ref 98–111)
CLARITY: ABNORMAL
CLARITY: CLEAR
CO2: 16 MMOL/L (ref 22–29)
CO2: 21 MMOL/L (ref 22–29)
CO2: 21 MMOL/L (ref 22–29)
CO2: 23 MMOL/L (ref 22–29)
CO2: 25 MMOL/L (ref 22–29)
CO2: 28 MMOL/L (ref 22–29)
CO2: 29 MMOL/L (ref 22–29)
CO2: 30 MMOL/L (ref 22–29)
COCAINE METABOLITE SCREEN URINE: NEGATIVE
COLOR: ABNORMAL
COLOR: ABNORMAL
COLOR: YELLOW
COLOR: YELLOW
CREAT SERPL-MCNC: 0.6 MG/DL (ref 0.5–0.9)
CREAT SERPL-MCNC: 0.7 MG/DL (ref 0.5–0.9)
CREAT SERPL-MCNC: 0.7 MG/DL (ref 0.5–0.9)
CREAT SERPL-MCNC: 0.8 MG/DL (ref 0.5–0.9)
CREAT SERPL-MCNC: 0.9 MG/DL (ref 0.5–0.9)
CREAT SERPL-MCNC: 0.9 MG/DL (ref 0.5–0.9)
CREAT SERPL-MCNC: 1.1 MG/DL (ref 0.5–0.9)
CREAT SERPL-MCNC: 1.2 MG/DL (ref 0.5–0.9)
CULTURE, BLOOD 2: ABNORMAL
CULTURE, BLOOD 2: ABNORMAL
EKG P AXIS: 48 DEGREES
EKG P AXIS: 49 DEGREES
EKG P-R INTERVAL: 140 MS
EKG P-R INTERVAL: 148 MS
EKG Q-T INTERVAL: 390 MS
EKG Q-T INTERVAL: 398 MS
EKG QRS DURATION: 86 MS
EKG QRS DURATION: 92 MS
EKG QTC CALCULATION (BAZETT): 412 MS
EKG QTC CALCULATION (BAZETT): 430 MS
EKG T AXIS: 13 DEGREES
EKG T AXIS: 40 DEGREES
EOSINOPHILS ABSOLUTE: 0 K/UL (ref 0–0.6)
EOSINOPHILS ABSOLUTE: 0.1 K/UL (ref 0–0.6)
EOSINOPHILS ABSOLUTE: 0.2 K/UL (ref 0–0.6)
EOSINOPHILS RELATIVE PERCENT: 0 % (ref 0–5)
EOSINOPHILS RELATIVE PERCENT: 0.1 % (ref 0–5)
EOSINOPHILS RELATIVE PERCENT: 0.4 % (ref 0–5)
EOSINOPHILS RELATIVE PERCENT: 1.1 % (ref 0–5)
EOSINOPHILS RELATIVE PERCENT: 1.3 % (ref 0–5)
EOSINOPHILS RELATIVE PERCENT: 1.3 % (ref 0–5)
EOSINOPHILS RELATIVE PERCENT: 1.6 % (ref 0–5)
EOSINOPHILS RELATIVE PERCENT: 1.8 % (ref 0–5)
EOSINOPHILS RELATIVE PERCENT: 2 % (ref 0–5)
EPITHELIAL CELLS, UA: ABNORMAL /HPF
ETHANOL: <10 MG/DL (ref 0–0.08)
GFR NON-AFRICAN AMERICAN: 50
GFR NON-AFRICAN AMERICAN: 55
GFR NON-AFRICAN AMERICAN: >60
GLUCOSE BLD-MCNC: 101 MG/DL (ref 74–109)
GLUCOSE BLD-MCNC: 112 MG/DL (ref 74–109)
GLUCOSE BLD-MCNC: 146 MG/DL (ref 74–109)
GLUCOSE BLD-MCNC: 155 MG/DL (ref 74–109)
GLUCOSE BLD-MCNC: 214 MG/DL (ref 74–109)
GLUCOSE BLD-MCNC: 77 MG/DL (ref 74–109)
GLUCOSE BLD-MCNC: 85 MG/DL (ref 74–109)
GLUCOSE BLD-MCNC: 85 MG/DL (ref 74–109)
GLUCOSE BLD-MCNC: 86 MG/DL (ref 74–109)
GLUCOSE BLD-MCNC: 99 MG/DL (ref 74–109)
GLUCOSE URINE: 250 MG/DL
GLUCOSE URINE: NEGATIVE MG/DL
HCG QUALITATIVE: NEGATIVE
HCG(URINE) PREGNANCY TEST: NEGATIVE
HCO3 ARTERIAL: 22.2 MMOL/L (ref 22–26)
HCT VFR BLD CALC: 34.7 % (ref 37–47)
HCT VFR BLD CALC: 36.2 % (ref 37–47)
HCT VFR BLD CALC: 39.6 % (ref 37–47)
HCT VFR BLD CALC: 39.7 % (ref 37–47)
HCT VFR BLD CALC: 39.7 % (ref 37–47)
HCT VFR BLD CALC: 43.1 % (ref 37–47)
HCT VFR BLD CALC: 43.2 % (ref 37–47)
HCT VFR BLD CALC: 44 % (ref 37–47)
HCT VFR BLD CALC: 45 % (ref 37–47)
HEMOGLOBIN, ART, EXTENDED: 12.5 G/DL (ref 12–16)
HEMOGLOBIN: 10.8 G/DL (ref 12–16)
HEMOGLOBIN: 11.6 G/DL (ref 12–16)
HEMOGLOBIN: 12.5 G/DL (ref 12–16)
HEMOGLOBIN: 12.7 G/DL (ref 12–16)
HEMOGLOBIN: 12.8 G/DL (ref 12–16)
HEMOGLOBIN: 13.1 G/DL (ref 12–16)
HEMOGLOBIN: 13.7 G/DL (ref 12–16)
HEMOGLOBIN: 13.9 G/DL (ref 12–16)
HEMOGLOBIN: 14.5 G/DL (ref 12–16)
IMMATURE GRANULOCYTES #: 0 K/UL
IMMATURE GRANULOCYTES #: 0.1 K/UL
IMMATURE GRANULOCYTES #: 0.2 K/UL
INR BLD: 1.03 (ref 0.88–1.18)
KETONES, URINE: ABNORMAL MG/DL
KETONES, URINE: ABNORMAL MG/DL
KETONES, URINE: NEGATIVE MG/DL
KETONES, URINE: NEGATIVE MG/DL
LACTIC ACID: 1.2 MMOL/L (ref 0.5–1.9)
LACTIC ACID: 2.1 MMOL/L (ref 0.5–1.9)
LACTIC ACID: 3 MMOL/L (ref 0.5–1.9)
LEUKOCYTE ESTERASE, URINE: NEGATIVE
LIPASE: 12 U/L (ref 13–60)
LYMPHOCYTES ABSOLUTE: 1.1 K/UL (ref 1.1–4.5)
LYMPHOCYTES ABSOLUTE: 1.5 K/UL (ref 1.1–4.5)
LYMPHOCYTES ABSOLUTE: 2.3 K/UL (ref 1.1–4.5)
LYMPHOCYTES ABSOLUTE: 2.4 K/UL (ref 1.1–4.5)
LYMPHOCYTES ABSOLUTE: 2.5 K/UL (ref 1.1–4.5)
LYMPHOCYTES ABSOLUTE: 2.8 K/UL (ref 1.1–4.5)
LYMPHOCYTES ABSOLUTE: 3 K/UL (ref 1.1–4.5)
LYMPHOCYTES ABSOLUTE: 3.2 K/UL (ref 1.1–4.5)
LYMPHOCYTES ABSOLUTE: 3.9 K/UL (ref 1.1–4.5)
LYMPHOCYTES RELATIVE PERCENT: 10.1 % (ref 20–40)
LYMPHOCYTES RELATIVE PERCENT: 21.4 % (ref 20–40)
LYMPHOCYTES RELATIVE PERCENT: 22.1 % (ref 20–40)
LYMPHOCYTES RELATIVE PERCENT: 26.5 % (ref 20–40)
LYMPHOCYTES RELATIVE PERCENT: 27.9 % (ref 20–40)
LYMPHOCYTES RELATIVE PERCENT: 28.6 % (ref 20–40)
LYMPHOCYTES RELATIVE PERCENT: 30.3 % (ref 20–40)
LYMPHOCYTES RELATIVE PERCENT: 37.4 % (ref 20–40)
LYMPHOCYTES RELATIVE PERCENT: 7 % (ref 20–40)
Lab: ABNORMAL
Lab: ABNORMAL
Lab: NORMAL
MAGNESIUM: 2.2 MG/DL (ref 1.6–2.6)
MAGNESIUM: 2.2 MG/DL (ref 1.6–2.6)
MCH RBC QN AUTO: 29.7 PG (ref 27–31)
MCH RBC QN AUTO: 30.6 PG (ref 27–31)
MCH RBC QN AUTO: 30.8 PG (ref 27–31)
MCH RBC QN AUTO: 30.8 PG (ref 27–31)
MCH RBC QN AUTO: 30.9 PG (ref 27–31)
MCH RBC QN AUTO: 31 PG (ref 27–31)
MCH RBC QN AUTO: 31 PG (ref 27–31)
MCH RBC QN AUTO: 31.3 PG (ref 27–31)
MCH RBC QN AUTO: 31.4 PG (ref 27–31)
MCHC RBC AUTO-ENTMCNC: 30.3 G/DL (ref 33–37)
MCHC RBC AUTO-ENTMCNC: 31.1 G/DL (ref 33–37)
MCHC RBC AUTO-ENTMCNC: 31.5 G/DL (ref 33–37)
MCHC RBC AUTO-ENTMCNC: 31.6 G/DL (ref 33–37)
MCHC RBC AUTO-ENTMCNC: 31.8 G/DL (ref 33–37)
MCHC RBC AUTO-ENTMCNC: 32 G/DL (ref 33–37)
MCHC RBC AUTO-ENTMCNC: 32 G/DL (ref 33–37)
MCHC RBC AUTO-ENTMCNC: 32.2 G/DL (ref 33–37)
MCHC RBC AUTO-ENTMCNC: 32.3 G/DL (ref 33–37)
MCV RBC AUTO: 101.4 FL (ref 81–99)
MCV RBC AUTO: 94 FL (ref 81–99)
MCV RBC AUTO: 96.2 FL (ref 81–99)
MCV RBC AUTO: 96.4 FL (ref 81–99)
MCV RBC AUTO: 96.4 FL (ref 81–99)
MCV RBC AUTO: 96.8 FL (ref 81–99)
MCV RBC AUTO: 97.3 FL (ref 81–99)
MCV RBC AUTO: 99.3 FL (ref 81–99)
MCV RBC AUTO: 99.4 FL (ref 81–99)
METHEMOGLOBIN ARTERIAL: 1.3 %
MONOCYTES ABSOLUTE: 0.4 K/UL (ref 0–0.9)
MONOCYTES ABSOLUTE: 0.4 K/UL (ref 0–0.9)
MONOCYTES ABSOLUTE: 0.5 K/UL (ref 0–0.9)
MONOCYTES ABSOLUTE: 0.6 K/UL (ref 0–0.9)
MONOCYTES ABSOLUTE: 0.7 K/UL (ref 0–0.9)
MONOCYTES ABSOLUTE: 1 K/UL (ref 0–0.9)
MONOCYTES RELATIVE PERCENT: 3.2 % (ref 0–10)
MONOCYTES RELATIVE PERCENT: 4.1 % (ref 0–10)
MONOCYTES RELATIVE PERCENT: 4.5 % (ref 0–10)
MONOCYTES RELATIVE PERCENT: 4.8 % (ref 0–10)
MONOCYTES RELATIVE PERCENT: 4.9 % (ref 0–10)
MONOCYTES RELATIVE PERCENT: 5.1 % (ref 0–10)
MONOCYTES RELATIVE PERCENT: 5.4 % (ref 0–10)
MONOCYTES RELATIVE PERCENT: 6.3 % (ref 0–10)
MONOCYTES RELATIVE PERCENT: 6.4 % (ref 0–10)
MUCUS: ABNORMAL /LPF
NEUTROPHILS ABSOLUTE: 19.2 K/UL (ref 1.5–7.5)
NEUTROPHILS ABSOLUTE: 5.2 K/UL (ref 1.5–7.5)
NEUTROPHILS ABSOLUTE: 5.9 K/UL (ref 1.5–7.5)
NEUTROPHILS ABSOLUTE: 6.5 K/UL (ref 1.5–7.5)
NEUTROPHILS ABSOLUTE: 6.7 K/UL (ref 1.5–7.5)
NEUTROPHILS ABSOLUTE: 7 K/UL (ref 1.5–7.5)
NEUTROPHILS ABSOLUTE: 7.8 K/UL (ref 1.5–7.5)
NEUTROPHILS ABSOLUTE: 8.1 K/UL (ref 1.5–7.5)
NEUTROPHILS ABSOLUTE: 9.3 K/UL (ref 1.5–7.5)
NEUTROPHILS RELATIVE PERCENT: 56.6 % (ref 50–65)
NEUTROPHILS RELATIVE PERCENT: 62.3 % (ref 50–65)
NEUTROPHILS RELATIVE PERCENT: 62.7 % (ref 50–65)
NEUTROPHILS RELATIVE PERCENT: 62.7 % (ref 50–65)
NEUTROPHILS RELATIVE PERCENT: 65.3 % (ref 50–65)
NEUTROPHILS RELATIVE PERCENT: 71.3 % (ref 50–65)
NEUTROPHILS RELATIVE PERCENT: 72 % (ref 50–65)
NEUTROPHILS RELATIVE PERCENT: 86.1 % (ref 50–65)
NEUTROPHILS RELATIVE PERCENT: 87.4 % (ref 50–65)
NITRITE, URINE: NEGATIVE
O2 CONTENT ARTERIAL: 16 ML/DL
O2 SAT, ARTERIAL: 90 %
O2 THERAPY: ABNORMAL
OPIATE SCREEN URINE: NEGATIVE
OPIATE SCREEN URINE: POSITIVE
OPIATE SCREEN URINE: POSITIVE
ORGANISM: ABNORMAL
PCO2 ARTERIAL: 44 MMHG (ref 35–45)
PDW BLD-RTO: 13.2 % (ref 11.5–14.5)
PDW BLD-RTO: 13.3 % (ref 11.5–14.5)
PDW BLD-RTO: 13.6 % (ref 11.5–14.5)
PDW BLD-RTO: 13.7 % (ref 11.5–14.5)
PDW BLD-RTO: 13.9 % (ref 11.5–14.5)
PDW BLD-RTO: 14.3 % (ref 11.5–14.5)
PDW BLD-RTO: 14.3 % (ref 11.5–14.5)
PDW BLD-RTO: 14.4 % (ref 11.5–14.5)
PDW BLD-RTO: 14.4 % (ref 11.5–14.5)
PH ARTERIAL: 7.31 (ref 7.35–7.45)
PH UA: 5.5
PH UA: 5.5 (ref 5–8)
PH UA: 5.5 (ref 5–8)
PH UA: 7 (ref 5–8)
PLATELET # BLD: 244 K/UL (ref 130–400)
PLATELET # BLD: 302 K/UL (ref 130–400)
PLATELET # BLD: 303 K/UL (ref 130–400)
PLATELET # BLD: 324 K/UL (ref 130–400)
PLATELET # BLD: 337 K/UL (ref 130–400)
PLATELET # BLD: 340 K/UL (ref 130–400)
PLATELET # BLD: 353 K/UL (ref 130–400)
PLATELET # BLD: 427 K/UL (ref 130–400)
PLATELET # BLD: 440 K/UL (ref 130–400)
PMV BLD AUTO: 10.1 FL (ref 9.4–12.3)
PMV BLD AUTO: 10.5 FL (ref 9.4–12.3)
PMV BLD AUTO: 9.1 FL (ref 9.4–12.3)
PMV BLD AUTO: 9.3 FL (ref 9.4–12.3)
PMV BLD AUTO: 9.4 FL (ref 9.4–12.3)
PMV BLD AUTO: 9.5 FL (ref 9.4–12.3)
PMV BLD AUTO: 9.8 FL (ref 9.4–12.3)
PO2 ARTERIAL: 130 MMHG (ref 80–100)
POTASSIUM REFLEX MAGNESIUM: 2.9 MMOL/L (ref 3.5–5)
POTASSIUM REFLEX MAGNESIUM: 3 MMOL/L (ref 3.5–5)
POTASSIUM REFLEX MAGNESIUM: 3.8 MMOL/L (ref 3.5–5)
POTASSIUM REFLEX MAGNESIUM: 3.9 MMOL/L (ref 3.5–5)
POTASSIUM REFLEX MAGNESIUM: 4 MMOL/L (ref 3.5–5)
POTASSIUM REFLEX MAGNESIUM: 4.4 MMOL/L (ref 3.5–5)
POTASSIUM SERPL-SCNC: 3.6 MMOL/L (ref 3.5–5)
POTASSIUM SERPL-SCNC: 3.7 MMOL/L (ref 3.5–5)
POTASSIUM SERPL-SCNC: 4.3 MMOL/L (ref 3.5–5)
POTASSIUM SERPL-SCNC: 4.6 MMOL/L (ref 3.5–5)
POTASSIUM, WHOLE BLOOD: 3.7
PROTEIN UA: 30 MG/DL
PROTEIN UA: ABNORMAL MG/DL
PROTHROMBIN TIME: 12.9 SEC (ref 12–14.6)
RBC # BLD: 3.49 M/UL (ref 4.2–5.4)
RBC # BLD: 3.74 M/UL (ref 4.2–5.4)
RBC # BLD: 4 M/UL (ref 4.2–5.4)
RBC # BLD: 4.07 M/UL (ref 4.2–5.4)
RBC # BLD: 4.12 M/UL (ref 4.2–5.4)
RBC # BLD: 4.26 M/UL (ref 4.2–5.4)
RBC # BLD: 4.47 M/UL (ref 4.2–5.4)
RBC # BLD: 4.68 M/UL (ref 4.2–5.4)
RBC # BLD: 4.68 M/UL (ref 4.2–5.4)
REASON FOR REJECTION: NORMAL
REJECTED TEST: NORMAL
SODIUM BLD-SCNC: 138 MMOL/L (ref 136–145)
SODIUM BLD-SCNC: 138 MMOL/L (ref 136–145)
SODIUM BLD-SCNC: 139 MMOL/L (ref 136–145)
SODIUM BLD-SCNC: 140 MMOL/L (ref 136–145)
SODIUM BLD-SCNC: 142 MMOL/L (ref 136–145)
SODIUM BLD-SCNC: 142 MMOL/L (ref 136–145)
SODIUM BLD-SCNC: 144 MMOL/L (ref 136–145)
SPECIFIC GRAVITY UA: 1.02 (ref 1–1.03)
SPECIFIC GRAVITY UA: 1.02 (ref 1–1.03)
SPECIFIC GRAVITY UA: 1.03
SPECIFIC GRAVITY UA: 1.04 (ref 1–1.03)
TOTAL PROTEIN: 6 G/DL (ref 6.6–8.7)
TOTAL PROTEIN: 6.1 G/DL (ref 6.6–8.7)
TOTAL PROTEIN: 6.7 G/DL (ref 6.6–8.7)
TOTAL PROTEIN: 6.7 G/DL (ref 6.6–8.7)
TOTAL PROTEIN: 6.9 G/DL (ref 6.6–8.7)
TOTAL PROTEIN: 7.4 G/DL (ref 6.6–8.7)
TOTAL PROTEIN: 7.7 G/DL (ref 6.6–8.7)
TOTAL PROTEIN: 7.9 G/DL (ref 6.6–8.7)
TSH REFLEX FT4: 1.43 UIU/ML (ref 0.35–5.5)
TSH SERPL DL<=0.05 MIU/L-ACNC: 6.27 UIU/ML (ref 0.27–4.2)
URINE REFLEX TO CULTURE: ABNORMAL
URINE REFLEX TO CULTURE: ABNORMAL
UROBILINOGEN, URINE: 0.2 E.U./DL
UROBILINOGEN, URINE: 1 E.U./DL
VITAMIN B-12: 472 PG/ML (ref 211–946)
VITAMIN B-12: 705 PG/ML (ref 211–946)
VITAMIN D 25-HYDROXY: 13.2 NG/ML
VITAMIN D 25-HYDROXY: 22.3 NG/ML
WBC # BLD: 10.4 K/UL (ref 4.8–10.8)
WBC # BLD: 10.5 K/UL (ref 4.8–10.8)
WBC # BLD: 10.7 K/UL (ref 4.8–10.8)
WBC # BLD: 10.7 K/UL (ref 4.8–10.8)
WBC # BLD: 10.8 K/UL (ref 4.8–10.8)
WBC # BLD: 10.9 K/UL (ref 4.8–10.8)
WBC # BLD: 11.2 K/UL (ref 4.8–10.8)
WBC # BLD: 22 K/UL (ref 4.8–10.8)
WBC # BLD: 8.4 K/UL (ref 4.8–10.8)
WBC UA: ABNORMAL /HPF (ref 0–5)

## 2019-01-01 PROCEDURE — 36415 COLL VENOUS BLD VENIPUNCTURE: CPT

## 2019-01-01 PROCEDURE — 84443 ASSAY THYROID STIM HORMONE: CPT

## 2019-01-01 PROCEDURE — 81003 URINALYSIS AUTO W/O SCOPE: CPT

## 2019-01-01 PROCEDURE — 71046 X-RAY EXAM CHEST 2 VIEWS: CPT

## 2019-01-01 PROCEDURE — 71045 X-RAY EXAM CHEST 1 VIEW: CPT

## 2019-01-01 PROCEDURE — 2580000003 HC RX 258: Performed by: EMERGENCY MEDICINE

## 2019-01-01 PROCEDURE — 3209999900 FLUORO FOR SURGICAL PROCEDURES

## 2019-01-01 PROCEDURE — G8417 CALC BMI ABV UP PARAM F/U: HCPCS | Performed by: NURSE PRACTITIONER

## 2019-01-01 PROCEDURE — 6360000002 HC RX W HCPCS: Performed by: PSYCHIATRY & NEUROLOGY

## 2019-01-01 PROCEDURE — 82306 VITAMIN D 25 HYDROXY: CPT

## 2019-01-01 PROCEDURE — 6370000000 HC RX 637 (ALT 250 FOR IP): Performed by: PSYCHIATRY & NEUROLOGY

## 2019-01-01 PROCEDURE — 99213 OFFICE O/P EST LOW 20 MIN: CPT | Performed by: NURSE PRACTITIONER

## 2019-01-01 PROCEDURE — 2709999900 HC NON-CHARGEABLE SUPPLY: Performed by: ORTHOPAEDIC SURGERY

## 2019-01-01 PROCEDURE — 36600 WITHDRAWAL OF ARTERIAL BLOOD: CPT

## 2019-01-01 PROCEDURE — 90837 PSYTX W PT 60 MINUTES: CPT | Performed by: SOCIAL WORKER

## 2019-01-01 PROCEDURE — 96374 THER/PROPH/DIAG INJ IV PUSH: CPT

## 2019-01-01 PROCEDURE — G8427 DOCREV CUR MEDS BY ELIG CLIN: HCPCS | Performed by: NURSE PRACTITIONER

## 2019-01-01 PROCEDURE — 82607 VITAMIN B-12: CPT

## 2019-01-01 PROCEDURE — G0480 DRUG TEST DEF 1-7 CLASSES: HCPCS

## 2019-01-01 PROCEDURE — 96375 TX/PRO/DX INJ NEW DRUG ADDON: CPT

## 2019-01-01 PROCEDURE — 90832 PSYTX W PT 30 MINUTES: CPT | Performed by: SOCIAL WORKER

## 2019-01-01 PROCEDURE — 64445 NJX AA&/STRD SCIATIC NRV IMG: CPT | Performed by: NURSE ANESTHETIST, CERTIFIED REGISTERED

## 2019-01-01 PROCEDURE — 6370000000 HC RX 637 (ALT 250 FOR IP): Performed by: EMERGENCY MEDICINE

## 2019-01-01 PROCEDURE — 6370000000 HC RX 637 (ALT 250 FOR IP): Performed by: INTERNAL MEDICINE

## 2019-01-01 PROCEDURE — 3700000001 HC ADD 15 MINUTES (ANESTHESIA): Performed by: ORTHOPAEDIC SURGERY

## 2019-01-01 PROCEDURE — 97116 GAIT TRAINING THERAPY: CPT

## 2019-01-01 PROCEDURE — 1240000000 HC EMOTIONAL WELLNESS R&B

## 2019-01-01 PROCEDURE — G8484 FLU IMMUNIZE NO ADMIN: HCPCS | Performed by: NURSE PRACTITIONER

## 2019-01-01 PROCEDURE — 85025 COMPLETE CBC W/AUTO DIFF WBC: CPT

## 2019-01-01 PROCEDURE — 6360000002 HC RX W HCPCS: Performed by: EMERGENCY MEDICINE

## 2019-01-01 PROCEDURE — 99223 1ST HOSP IP/OBS HIGH 75: CPT | Performed by: PSYCHIATRY & NEUROLOGY

## 2019-01-01 PROCEDURE — 99214 OFFICE O/P EST MOD 30 MIN: CPT | Performed by: NURSE PRACTITIONER

## 2019-01-01 PROCEDURE — 2580000003 HC RX 258: Performed by: ANESTHESIOLOGY

## 2019-01-01 PROCEDURE — 99285 EMERGENCY DEPT VISIT HI MDM: CPT

## 2019-01-01 PROCEDURE — 1210000000 HC MED SURG R&B

## 2019-01-01 PROCEDURE — 80053 COMPREHEN METABOLIC PANEL: CPT

## 2019-01-01 PROCEDURE — 73590 X-RAY EXAM OF LOWER LEG: CPT

## 2019-01-01 PROCEDURE — 4004F PT TOBACCO SCREEN RCVD TLK: CPT | Performed by: NURSE PRACTITIONER

## 2019-01-01 PROCEDURE — 3E0T3BZ INTRODUCTION OF ANESTHETIC AGENT INTO PERIPHERAL NERVES AND PLEXI, PERCUTANEOUS APPROACH: ICD-10-PCS | Performed by: ANESTHESIOLOGY

## 2019-01-01 PROCEDURE — 83690 ASSAY OF LIPASE: CPT

## 2019-01-01 PROCEDURE — 2720000010 HC SURG SUPPLY STERILE: Performed by: ORTHOPAEDIC SURGERY

## 2019-01-01 PROCEDURE — 90791 PSYCH DIAGNOSTIC EVALUATION: CPT | Performed by: SOCIAL WORKER

## 2019-01-01 PROCEDURE — 83735 ASSAY OF MAGNESIUM: CPT

## 2019-01-01 PROCEDURE — 6370000000 HC RX 637 (ALT 250 FOR IP): Performed by: NURSE PRACTITIONER

## 2019-01-01 PROCEDURE — 1111F DSCHRG MED/CURRENT MED MERGE: CPT | Performed by: NURSE PRACTITIONER

## 2019-01-01 PROCEDURE — 7100000000 HC PACU RECOVERY - FIRST 15 MIN: Performed by: ORTHOPAEDIC SURGERY

## 2019-01-01 PROCEDURE — 6370000000 HC RX 637 (ALT 250 FOR IP): Performed by: ORTHOPAEDIC SURGERY

## 2019-01-01 PROCEDURE — 2580000003 HC RX 258: Performed by: FAMILY MEDICINE

## 2019-01-01 PROCEDURE — 2580000003 HC RX 258: Performed by: ORTHOPAEDIC SURGERY

## 2019-01-01 PROCEDURE — 5130000000 HC BRIDGE APPOINTMENT

## 2019-01-01 PROCEDURE — 73610 X-RAY EXAM OF ANKLE: CPT

## 2019-01-01 PROCEDURE — 6360000002 HC RX W HCPCS: Performed by: FAMILY MEDICINE

## 2019-01-01 PROCEDURE — C1769 GUIDE WIRE: HCPCS | Performed by: ORTHOPAEDIC SURGERY

## 2019-01-01 PROCEDURE — 74018 RADEX ABDOMEN 1 VIEW: CPT

## 2019-01-01 PROCEDURE — 80307 DRUG TEST PRSMV CHEM ANLYZR: CPT

## 2019-01-01 PROCEDURE — 6360000002 HC RX W HCPCS: Performed by: ANESTHESIOLOGY

## 2019-01-01 PROCEDURE — 93010 ELECTROCARDIOGRAM REPORT: CPT | Performed by: INTERNAL MEDICINE

## 2019-01-01 PROCEDURE — 84703 CHORIONIC GONADOTROPIN ASSAY: CPT

## 2019-01-01 PROCEDURE — 6360000002 HC RX W HCPCS: Performed by: NURSE ANESTHETIST, CERTIFIED REGISTERED

## 2019-01-01 PROCEDURE — 99238 HOSP IP/OBS DSCHRG MGMT 30/<: CPT | Performed by: NURSE PRACTITIONER

## 2019-01-01 PROCEDURE — 74022 RADEX COMPL AQT ABD SERIES: CPT

## 2019-01-01 PROCEDURE — 99999 PR OFFICE/OUTPT VISIT,PROCEDURE ONLY: CPT | Performed by: EMERGENCY MEDICINE

## 2019-01-01 PROCEDURE — 99283 EMERGENCY DEPT VISIT LOW MDM: CPT

## 2019-01-01 PROCEDURE — 99285 EMERGENCY DEPT VISIT HI MDM: CPT | Performed by: EMERGENCY MEDICINE

## 2019-01-01 PROCEDURE — 2500000003 HC RX 250 WO HCPCS: Performed by: NURSE ANESTHETIST, CERTIFIED REGISTERED

## 2019-01-01 PROCEDURE — 97530 THERAPEUTIC ACTIVITIES: CPT

## 2019-01-01 PROCEDURE — 2580000003 HC RX 258: Performed by: INTERNAL MEDICINE

## 2019-01-01 PROCEDURE — G0378 HOSPITAL OBSERVATION PER HR: HCPCS

## 2019-01-01 PROCEDURE — 85610 PROTHROMBIN TIME: CPT

## 2019-01-01 PROCEDURE — 99284 EMERGENCY DEPT VISIT MOD MDM: CPT

## 2019-01-01 PROCEDURE — 6360000002 HC RX W HCPCS: Performed by: ORTHOPAEDIC SURGERY

## 2019-01-01 PROCEDURE — 7100000001 HC PACU RECOVERY - ADDTL 15 MIN: Performed by: ORTHOPAEDIC SURGERY

## 2019-01-01 PROCEDURE — 83605 ASSAY OF LACTIC ACID: CPT

## 2019-01-01 PROCEDURE — 87040 BLOOD CULTURE FOR BACTERIA: CPT

## 2019-01-01 PROCEDURE — 96372 THER/PROPH/DIAG INJ SC/IM: CPT

## 2019-01-01 PROCEDURE — 81001 URINALYSIS AUTO W/SCOPE: CPT

## 2019-01-01 PROCEDURE — 6370000000 HC RX 637 (ALT 250 FOR IP): Performed by: FAMILY MEDICINE

## 2019-01-01 PROCEDURE — 6360000002 HC RX W HCPCS: Performed by: INTERNAL MEDICINE

## 2019-01-01 PROCEDURE — 96361 HYDRATE IV INFUSION ADD-ON: CPT

## 2019-01-01 PROCEDURE — 3600000014 HC SURGERY LEVEL 4 ADDTL 15MIN: Performed by: ORTHOPAEDIC SURGERY

## 2019-01-01 PROCEDURE — 93005 ELECTROCARDIOGRAM TRACING: CPT

## 2019-01-01 PROCEDURE — 93005 ELECTROCARDIOGRAM TRACING: CPT | Performed by: FAMILY MEDICINE

## 2019-01-01 PROCEDURE — 3600000004 HC SURGERY LEVEL 4 BASE: Performed by: ORTHOPAEDIC SURGERY

## 2019-01-01 PROCEDURE — C1713 ANCHOR/SCREW BN/BN,TIS/BN: HCPCS | Performed by: ORTHOPAEDIC SURGERY

## 2019-01-01 PROCEDURE — 3700000000 HC ANESTHESIA ATTENDED CARE: Performed by: ORTHOPAEDIC SURGERY

## 2019-01-01 PROCEDURE — 0QSG04Z REPOSITION RIGHT TIBIA WITH INTERNAL FIXATION DEVICE, OPEN APPROACH: ICD-10-PCS | Performed by: ORTHOPAEDIC SURGERY

## 2019-01-01 PROCEDURE — 84132 ASSAY OF SERUM POTASSIUM: CPT

## 2019-01-01 PROCEDURE — 99283 EMERGENCY DEPT VISIT LOW MDM: CPT | Performed by: EMERGENCY MEDICINE

## 2019-01-01 PROCEDURE — 82803 BLOOD GASES ANY COMBINATION: CPT

## 2019-01-01 PROCEDURE — 97161 PT EVAL LOW COMPLEX 20 MIN: CPT

## 2019-01-01 PROCEDURE — 80048 BASIC METABOLIC PNL TOTAL CA: CPT

## 2019-01-01 PROCEDURE — 90792 PSYCH DIAG EVAL W/MED SRVCS: CPT | Performed by: NURSE PRACTITIONER

## 2019-01-01 PROCEDURE — 97165 OT EVAL LOW COMPLEX 30 MIN: CPT

## 2019-01-01 DEVICE — PLATE BNE L112MM 6 H R DST LAT FIBULAR S STL LOK COMPR FOR: Type: IMPLANTABLE DEVICE | Site: ANKLE | Status: FUNCTIONAL

## 2019-01-01 DEVICE — SCREW BNE L14MM DIA2.7MM CORT S STL ST FULL THRD FOR SM: Type: IMPLANTABLE DEVICE | Site: ANKLE | Status: FUNCTIONAL

## 2019-01-01 DEVICE — SCREW BNE L14MM DIA3.5MM CORT S STL ST NONCANNULATED LOK: Type: IMPLANTABLE DEVICE | Site: ANKLE | Status: FUNCTIONAL

## 2019-01-01 DEVICE — SCREW BNE L40MM DIA4MM S STL CANN SHT 1/3 THRD SM HEX SOCK: Type: IMPLANTABLE DEVICE | Site: ANKLE | Status: FUNCTIONAL

## 2019-01-01 DEVICE — SCREW BNE L14MM DIA2.7MM CORT S STL ST LOK FULL THRD T8: Type: IMPLANTABLE DEVICE | Site: ANKLE | Status: FUNCTIONAL

## 2019-01-01 DEVICE — SCREW BNE L16MM DIA3.5MM CORT S STL ST NONCANNULATED LOK: Type: IMPLANTABLE DEVICE | Site: ANKLE | Status: FUNCTIONAL

## 2019-01-01 DEVICE — SCREW BNE L16MM DIA2.7MM CORT S STL ST LOK FULL THRD T8: Type: IMPLANTABLE DEVICE | Site: ANKLE | Status: FUNCTIONAL

## 2019-01-01 DEVICE — SCREW BNE L12MM DIA2.7MM CORT S STL ST LOK FULL THRD T8: Type: IMPLANTABLE DEVICE | Site: ANKLE | Status: FUNCTIONAL

## 2019-01-01 RX ORDER — LORAZEPAM 2 MG/ML
2 INJECTION INTRAMUSCULAR ONCE
Status: COMPLETED | OUTPATIENT
Start: 2019-01-01 | End: 2019-01-01

## 2019-01-01 RX ORDER — LORAZEPAM 2 MG/ML
1 INJECTION INTRAMUSCULAR EVERY 4 HOURS PRN
Status: DISCONTINUED | OUTPATIENT
Start: 2019-01-01 | End: 2019-01-01 | Stop reason: HOSPADM

## 2019-01-01 RX ORDER — ONDANSETRON 4 MG/1
4 TABLET, ORALLY DISINTEGRATING ORAL EVERY 8 HOURS PRN
Qty: 30 TABLET | Refills: 1 | Status: SHIPPED | OUTPATIENT
Start: 2019-01-01 | End: 2019-01-01 | Stop reason: SDUPTHER

## 2019-01-01 RX ORDER — ERGOCALCIFEROL 1.25 MG/1
50000 CAPSULE ORAL WEEKLY
Status: DISCONTINUED | OUTPATIENT
Start: 2019-01-01 | End: 2019-01-01 | Stop reason: HOSPADM

## 2019-01-01 RX ORDER — ERGOCALCIFEROL 1.25 MG/1
50000 CAPSULE ORAL WEEKLY
Qty: 11 CAPSULE | Refills: 0 | Status: SHIPPED | OUTPATIENT
Start: 2019-01-01 | End: 2020-03-04

## 2019-01-01 RX ORDER — ONDANSETRON 2 MG/ML
4 INJECTION INTRAMUSCULAR; INTRAVENOUS ONCE
Status: COMPLETED | OUTPATIENT
Start: 2019-01-01 | End: 2019-01-01

## 2019-01-01 RX ORDER — ONDANSETRON 4 MG/1
4 TABLET, FILM COATED ORAL EVERY 8 HOURS PRN
Qty: 20 TABLET | Refills: 1 | Status: ON HOLD | OUTPATIENT
Start: 2019-01-01 | End: 2019-01-01 | Stop reason: HOSPADM

## 2019-01-01 RX ORDER — METOCLOPRAMIDE HYDROCHLORIDE 5 MG/ML
10 INJECTION INTRAMUSCULAR; INTRAVENOUS
Status: DISCONTINUED | OUTPATIENT
Start: 2019-01-01 | End: 2019-01-01 | Stop reason: HOSPADM

## 2019-01-01 RX ORDER — CYCLOBENZAPRINE HCL 10 MG
10 TABLET ORAL 3 TIMES DAILY PRN
Status: DISCONTINUED | OUTPATIENT
Start: 2019-01-01 | End: 2019-01-01 | Stop reason: HOSPADM

## 2019-01-01 RX ORDER — LIDOCAINE HYDROCHLORIDE 10 MG/ML
1 INJECTION, SOLUTION EPIDURAL; INFILTRATION; INTRACAUDAL; PERINEURAL
Status: DISCONTINUED | OUTPATIENT
Start: 2019-01-01 | End: 2019-01-01 | Stop reason: HOSPADM

## 2019-01-01 RX ORDER — VENLAFAXINE HYDROCHLORIDE 75 MG/1
150 CAPSULE, EXTENDED RELEASE ORAL DAILY
Status: DISCONTINUED | OUTPATIENT
Start: 2019-01-01 | End: 2019-01-01 | Stop reason: HOSPADM

## 2019-01-01 RX ORDER — POLYETHYLENE GLYCOL 3350 17 G/17G
17 POWDER, FOR SOLUTION ORAL DAILY PRN
Status: DISCONTINUED | OUTPATIENT
Start: 2019-01-01 | End: 2019-01-01 | Stop reason: HOSPADM

## 2019-01-01 RX ORDER — ONDANSETRON 4 MG/1
TABLET, ORALLY DISINTEGRATING ORAL
Qty: 20 TABLET | Refills: 0 | Status: SHIPPED | OUTPATIENT
Start: 2019-01-01 | End: 2019-01-01 | Stop reason: SDUPTHER

## 2019-01-01 RX ORDER — VENLAFAXINE HYDROCHLORIDE 75 MG/1
150 CAPSULE, EXTENDED RELEASE ORAL DAILY
Status: DISCONTINUED | OUTPATIENT
Start: 2019-01-01 | End: 2019-01-01

## 2019-01-01 RX ORDER — GABAPENTIN 800 MG/1
800 TABLET ORAL 4 TIMES DAILY
Qty: 120 TABLET | Refills: 2 | Status: SHIPPED | OUTPATIENT
Start: 2019-01-01 | End: 2019-01-01 | Stop reason: SDUPTHER

## 2019-01-01 RX ORDER — QUETIAPINE FUMARATE 50 MG/1
TABLET, FILM COATED ORAL
Qty: 30 TABLET | Refills: 11 | OUTPATIENT
Start: 2019-01-01

## 2019-01-01 RX ORDER — SODIUM CHLORIDE, SODIUM LACTATE, POTASSIUM CHLORIDE, CALCIUM CHLORIDE 600; 310; 30; 20 MG/100ML; MG/100ML; MG/100ML; MG/100ML
1000 INJECTION, SOLUTION INTRAVENOUS ONCE
Status: COMPLETED | OUTPATIENT
Start: 2019-01-01 | End: 2019-01-01

## 2019-01-01 RX ORDER — ONDANSETRON 4 MG/1
4 TABLET, ORALLY DISINTEGRATING ORAL EVERY 8 HOURS PRN
Status: DISCONTINUED | OUTPATIENT
Start: 2019-01-01 | End: 2019-01-01 | Stop reason: HOSPADM

## 2019-01-01 RX ORDER — OXYCODONE HYDROCHLORIDE 5 MG/1
5 TABLET ORAL EVERY 4 HOURS PRN
Status: DISCONTINUED | OUTPATIENT
Start: 2019-01-01 | End: 2019-01-01 | Stop reason: HOSPADM

## 2019-01-01 RX ORDER — VENLAFAXINE HYDROCHLORIDE 37.5 MG/1
37.5 CAPSULE, EXTENDED RELEASE ORAL DAILY
Status: DISCONTINUED | OUTPATIENT
Start: 2019-01-01 | End: 2019-01-01

## 2019-01-01 RX ORDER — BUSPIRONE HYDROCHLORIDE 10 MG/1
10 TABLET ORAL 3 TIMES DAILY
Status: DISCONTINUED | OUTPATIENT
Start: 2019-01-01 | End: 2019-01-01 | Stop reason: HOSPADM

## 2019-01-01 RX ORDER — QUETIAPINE FUMARATE 100 MG/1
TABLET, FILM COATED ORAL
Qty: 60 TABLET | Refills: 0 | Status: SHIPPED | OUTPATIENT
Start: 2019-01-01 | End: 2019-01-01

## 2019-01-01 RX ORDER — ONDANSETRON 4 MG/1
4 TABLET, FILM COATED ORAL EVERY 8 HOURS PRN
Qty: 20 TABLET | Refills: 1 | Status: SHIPPED | OUTPATIENT
Start: 2019-01-01 | End: 2019-01-01 | Stop reason: SDUPTHER

## 2019-01-01 RX ORDER — METHADONE HYDROCHLORIDE 10 MG/1
120 TABLET ORAL DAILY
Status: DISCONTINUED | OUTPATIENT
Start: 2019-01-01 | End: 2019-01-01 | Stop reason: HOSPADM

## 2019-01-01 RX ORDER — GABAPENTIN 400 MG/1
800 CAPSULE ORAL 4 TIMES DAILY
Status: DISCONTINUED | OUTPATIENT
Start: 2019-01-01 | End: 2019-01-01 | Stop reason: HOSPADM

## 2019-01-01 RX ORDER — KETOROLAC TROMETHAMINE 30 MG/ML
30 INJECTION, SOLUTION INTRAMUSCULAR; INTRAVENOUS ONCE
Status: DISCONTINUED | OUTPATIENT
Start: 2019-01-01 | End: 2019-01-01

## 2019-01-01 RX ORDER — PROPOFOL 10 MG/ML
INJECTION, EMULSION INTRAVENOUS PRN
Status: DISCONTINUED | OUTPATIENT
Start: 2019-01-01 | End: 2019-01-01 | Stop reason: SDUPTHER

## 2019-01-01 RX ORDER — QUETIAPINE FUMARATE 100 MG/1
TABLET, FILM COATED ORAL
Qty: 45 TABLET | OUTPATIENT
Start: 2019-01-01

## 2019-01-01 RX ORDER — SODIUM CHLORIDE 0.9 % (FLUSH) 0.9 %
10 SYRINGE (ML) INJECTION EVERY 12 HOURS SCHEDULED
Status: DISCONTINUED | OUTPATIENT
Start: 2019-01-01 | End: 2019-01-01 | Stop reason: SDUPTHER

## 2019-01-01 RX ORDER — PERMETHRIN 50 MG/G
CREAM TOPICAL
Qty: 2 TUBE | Refills: 0 | Status: ON HOLD | OUTPATIENT
Start: 2019-01-01 | End: 2019-01-01 | Stop reason: ALTCHOICE

## 2019-01-01 RX ORDER — ONDANSETRON 8 MG/1
8 TABLET, ORALLY DISINTEGRATING ORAL EVERY 8 HOURS PRN
Qty: 20 TABLET | Refills: 0 | Status: SHIPPED | OUTPATIENT
Start: 2019-01-01 | End: 2019-01-01 | Stop reason: SDUPTHER

## 2019-01-01 RX ORDER — PANTOPRAZOLE SODIUM 40 MG/1
40 TABLET, DELAYED RELEASE ORAL DAILY
Qty: 30 TABLET | Refills: 11 | Status: SHIPPED | OUTPATIENT
Start: 2019-01-01 | End: 2019-01-01 | Stop reason: SDUPTHER

## 2019-01-01 RX ORDER — ACETAMINOPHEN 325 MG/1
650 TABLET ORAL EVERY 4 HOURS PRN
Status: DISCONTINUED | OUTPATIENT
Start: 2019-01-01 | End: 2019-01-01 | Stop reason: HOSPADM

## 2019-01-01 RX ORDER — ONDANSETRON 4 MG/1
4 TABLET, FILM COATED ORAL 3 TIMES DAILY PRN
Status: DISCONTINUED | OUTPATIENT
Start: 2019-01-01 | End: 2019-01-01

## 2019-01-01 RX ORDER — LAMOTRIGINE 25 MG/1
25 TABLET ORAL 2 TIMES DAILY
Qty: 60 TABLET | Refills: 5 | Status: SHIPPED | OUTPATIENT
Start: 2019-01-01 | End: 2019-01-01

## 2019-01-01 RX ORDER — MELOXICAM 15 MG/1
TABLET ORAL
Qty: 90 TABLET | Refills: 3 | Status: ON HOLD | OUTPATIENT
Start: 2019-01-01 | End: 2019-01-01 | Stop reason: ALTCHOICE

## 2019-01-01 RX ORDER — LANOLIN ALCOHOL/MO/W.PET/CERES
6 CREAM (GRAM) TOPICAL NIGHTLY PRN
Status: DISCONTINUED | OUTPATIENT
Start: 2019-01-01 | End: 2019-01-01 | Stop reason: HOSPADM

## 2019-01-01 RX ORDER — OXYCODONE HYDROCHLORIDE 5 MG/1
10 TABLET ORAL EVERY 4 HOURS PRN
Status: DISCONTINUED | OUTPATIENT
Start: 2019-01-01 | End: 2019-01-01 | Stop reason: HOSPADM

## 2019-01-01 RX ORDER — ONDANSETRON 4 MG/1
4 TABLET, ORALLY DISINTEGRATING ORAL ONCE
Status: COMPLETED | OUTPATIENT
Start: 2019-01-01 | End: 2019-01-01

## 2019-01-01 RX ORDER — TRAZODONE HYDROCHLORIDE 100 MG/1
TABLET ORAL
Qty: 60 TABLET | Refills: 5 | OUTPATIENT
Start: 2019-01-01

## 2019-01-01 RX ORDER — LIDOCAINE HYDROCHLORIDE 10 MG/ML
INJECTION, SOLUTION INFILTRATION; PERINEURAL PRN
Status: DISCONTINUED | OUTPATIENT
Start: 2019-01-01 | End: 2019-01-01 | Stop reason: SDUPTHER

## 2019-01-01 RX ORDER — SCOLOPAMINE TRANSDERMAL SYSTEM 1 MG/1
1 PATCH, EXTENDED RELEASE TRANSDERMAL ONCE
Status: DISCONTINUED | OUTPATIENT
Start: 2019-01-01 | End: 2019-01-01 | Stop reason: HOSPADM

## 2019-01-01 RX ORDER — HALOPERIDOL 5 MG/ML
5 INJECTION INTRAMUSCULAR ONCE
Status: COMPLETED | OUTPATIENT
Start: 2019-01-01 | End: 2019-01-01

## 2019-01-01 RX ORDER — QUETIAPINE FUMARATE 50 MG/1
TABLET, FILM COATED ORAL
Qty: 30 TABLET | Refills: 11 | Status: SHIPPED | OUTPATIENT
Start: 2019-01-01

## 2019-01-01 RX ORDER — LAMOTRIGINE 25 MG/1
25 TABLET ORAL 2 TIMES DAILY
Status: DISCONTINUED | OUTPATIENT
Start: 2019-01-01 | End: 2019-01-01 | Stop reason: HOSPADM

## 2019-01-01 RX ORDER — MORPHINE SULFATE 4 MG/ML
2 INJECTION, SOLUTION INTRAMUSCULAR; INTRAVENOUS
Status: DISCONTINUED | OUTPATIENT
Start: 2019-01-01 | End: 2019-01-01 | Stop reason: HOSPADM

## 2019-01-01 RX ORDER — CHOLECALCIFEROL (VITAMIN D3) 125 MCG
500 CAPSULE ORAL DAILY
Status: DISCONTINUED | OUTPATIENT
Start: 2019-01-01 | End: 2019-01-01

## 2019-01-01 RX ORDER — CEFAZOLIN SODIUM 1 G/3ML
INJECTION, POWDER, FOR SOLUTION INTRAMUSCULAR; INTRAVENOUS PRN
Status: DISCONTINUED | OUTPATIENT
Start: 2019-01-01 | End: 2019-01-01 | Stop reason: SDUPTHER

## 2019-01-01 RX ORDER — HYDROXYZINE PAMOATE 25 MG/1
50 CAPSULE ORAL ONCE
Status: COMPLETED | OUTPATIENT
Start: 2019-01-01 | End: 2019-01-01

## 2019-01-01 RX ORDER — MEPERIDINE HYDROCHLORIDE 50 MG/ML
12.5 INJECTION INTRAMUSCULAR; INTRAVENOUS; SUBCUTANEOUS EVERY 5 MIN PRN
Status: DISCONTINUED | OUTPATIENT
Start: 2019-01-01 | End: 2019-01-01 | Stop reason: HOSPADM

## 2019-01-01 RX ORDER — METOPROLOL SUCCINATE 50 MG/1
50 TABLET, EXTENDED RELEASE ORAL DAILY
Status: DISCONTINUED | OUTPATIENT
Start: 2019-01-01 | End: 2019-01-01 | Stop reason: HOSPADM

## 2019-01-01 RX ORDER — SODIUM CHLORIDE 9 MG/ML
INJECTION, SOLUTION INTRAVENOUS CONTINUOUS
Status: DISCONTINUED | OUTPATIENT
Start: 2019-01-01 | End: 2019-01-01 | Stop reason: HOSPADM

## 2019-01-01 RX ORDER — VENLAFAXINE HYDROCHLORIDE 37.5 MG/1
37.5 CAPSULE, EXTENDED RELEASE ORAL DAILY
Qty: 30 CAPSULE | Refills: 11 | Status: SHIPPED | OUTPATIENT
Start: 2019-01-01

## 2019-01-01 RX ORDER — ALBUTEROL SULFATE 1.25 MG/3ML
1 SOLUTION RESPIRATORY (INHALATION) EVERY 6 HOURS PRN
Qty: 360 ML | Refills: 3 | Status: ON HOLD | OUTPATIENT
Start: 2019-01-01 | End: 2019-01-01 | Stop reason: ALTCHOICE

## 2019-01-01 RX ORDER — ONDANSETRON 4 MG/1
4 TABLET, ORALLY DISINTEGRATING ORAL EVERY 8 HOURS PRN
Qty: 12 TABLET | Refills: 0 | Status: SHIPPED | OUTPATIENT
Start: 2019-01-01 | End: 2019-01-01 | Stop reason: SDUPTHER

## 2019-01-01 RX ORDER — 0.9 % SODIUM CHLORIDE 0.9 %
1000 INTRAVENOUS SOLUTION INTRAVENOUS ONCE
Status: COMPLETED | OUTPATIENT
Start: 2019-01-01 | End: 2019-01-01

## 2019-01-01 RX ORDER — LANOLIN ALCOHOL/MO/W.PET/CERES
3 CREAM (GRAM) TOPICAL NIGHTLY
Status: DISCONTINUED | OUTPATIENT
Start: 2019-01-01 | End: 2019-01-01

## 2019-01-01 RX ORDER — VENLAFAXINE HYDROCHLORIDE 150 MG/1
150 CAPSULE, EXTENDED RELEASE ORAL DAILY
Qty: 30 CAPSULE | Refills: 0 | Status: SHIPPED | OUTPATIENT
Start: 2019-01-01 | End: 2019-01-01 | Stop reason: SDUPTHER

## 2019-01-01 RX ORDER — SODIUM CHLORIDE 0.9 % (FLUSH) 0.9 %
10 SYRINGE (ML) INJECTION PRN
Status: DISCONTINUED | OUTPATIENT
Start: 2019-01-01 | End: 2019-01-01 | Stop reason: HOSPADM

## 2019-01-01 RX ORDER — ONDANSETRON 4 MG/1
4 TABLET, ORALLY DISINTEGRATING ORAL EVERY 8 HOURS PRN
Qty: 30 TABLET | Refills: 1 | Status: ON HOLD | OUTPATIENT
Start: 2019-01-01 | End: 2019-01-01 | Stop reason: HOSPADM

## 2019-01-01 RX ORDER — ALBUTEROL SULFATE 1.25 MG/3ML
1 SOLUTION RESPIRATORY (INHALATION) EVERY 6 HOURS PRN
Qty: 360 ML | Refills: 3 | Status: SHIPPED | OUTPATIENT
Start: 2019-01-01 | End: 2019-01-01 | Stop reason: CLARIF

## 2019-01-01 RX ORDER — QUETIAPINE FUMARATE 50 MG/1
50 TABLET, FILM COATED ORAL NIGHTLY
Qty: 60 TABLET | Refills: 5 | Status: SHIPPED | OUTPATIENT
Start: 2019-01-01 | End: 2019-01-01 | Stop reason: SDUPTHER

## 2019-01-01 RX ORDER — EPHEDRINE SULFATE 50 MG/ML
INJECTION, SOLUTION INTRAVENOUS PRN
Status: DISCONTINUED | OUTPATIENT
Start: 2019-01-01 | End: 2019-01-01 | Stop reason: SDUPTHER

## 2019-01-01 RX ORDER — SODIUM CHLORIDE, SODIUM LACTATE, POTASSIUM CHLORIDE, CALCIUM CHLORIDE 600; 310; 30; 20 MG/100ML; MG/100ML; MG/100ML; MG/100ML
INJECTION, SOLUTION INTRAVENOUS CONTINUOUS
Status: DISCONTINUED | OUTPATIENT
Start: 2019-01-01 | End: 2019-01-01

## 2019-01-01 RX ORDER — PANTOPRAZOLE SODIUM 40 MG/1
40 TABLET, DELAYED RELEASE ORAL DAILY
Status: DISCONTINUED | OUTPATIENT
Start: 2019-01-01 | End: 2019-01-01 | Stop reason: HOSPADM

## 2019-01-01 RX ORDER — VENLAFAXINE HYDROCHLORIDE 150 MG/1
150 CAPSULE, EXTENDED RELEASE ORAL DAILY
Qty: 30 CAPSULE | Refills: 11 | Status: SHIPPED | OUTPATIENT
Start: 2019-01-01

## 2019-01-01 RX ORDER — ONDANSETRON 2 MG/ML
4 INJECTION INTRAMUSCULAR; INTRAVENOUS EVERY 6 HOURS PRN
Status: DISCONTINUED | OUTPATIENT
Start: 2019-01-01 | End: 2019-01-01 | Stop reason: HOSPADM

## 2019-01-01 RX ORDER — GABAPENTIN 600 MG/1
600 TABLET ORAL 3 TIMES DAILY
Status: DISCONTINUED | OUTPATIENT
Start: 2019-01-01 | End: 2019-01-01 | Stop reason: HOSPADM

## 2019-01-01 RX ORDER — ATORVASTATIN CALCIUM 10 MG/1
20 TABLET, FILM COATED ORAL DAILY
Status: DISCONTINUED | OUTPATIENT
Start: 2019-01-01 | End: 2019-01-01 | Stop reason: HOSPADM

## 2019-01-01 RX ORDER — SODIUM CHLORIDE 0.9 % (FLUSH) 0.9 %
10 SYRINGE (ML) INJECTION EVERY 12 HOURS SCHEDULED
Status: DISCONTINUED | OUTPATIENT
Start: 2019-01-01 | End: 2019-01-01 | Stop reason: HOSPADM

## 2019-01-01 RX ORDER — QUETIAPINE FUMARATE 100 MG/1
100 TABLET, FILM COATED ORAL NIGHTLY
Status: DISCONTINUED | OUTPATIENT
Start: 2019-01-01 | End: 2019-01-01 | Stop reason: HOSPADM

## 2019-01-01 RX ORDER — ATORVASTATIN CALCIUM 20 MG/1
20 TABLET, FILM COATED ORAL NIGHTLY
Qty: 30 TABLET | Refills: 11 | Status: ON HOLD | OUTPATIENT
Start: 2019-01-01 | End: 2019-01-01 | Stop reason: ALTCHOICE

## 2019-01-01 RX ORDER — QUETIAPINE FUMARATE 50 MG/1
TABLET, FILM COATED ORAL
Qty: 60 TABLET | Refills: 3 | Status: SHIPPED | OUTPATIENT
Start: 2019-01-01 | End: 2019-01-01 | Stop reason: SDUPTHER

## 2019-01-01 RX ORDER — METHADONE HYDROCHLORIDE 40 MG/1
120 TABLET ORAL DAILY
Status: DISCONTINUED | OUTPATIENT
Start: 2019-01-01 | End: 2019-01-01 | Stop reason: SDUPTHER

## 2019-01-01 RX ORDER — FENTANYL CITRATE 50 UG/ML
INJECTION, SOLUTION INTRAMUSCULAR; INTRAVENOUS PRN
Status: DISCONTINUED | OUTPATIENT
Start: 2019-01-01 | End: 2019-01-01 | Stop reason: SDUPTHER

## 2019-01-01 RX ORDER — ACETAMINOPHEN 500 MG
1000 TABLET ORAL ONCE
Status: COMPLETED | OUTPATIENT
Start: 2019-01-01 | End: 2019-01-01

## 2019-01-01 RX ORDER — LAMOTRIGINE 25 MG/1
25 TABLET ORAL DAILY
COMMUNITY

## 2019-01-01 RX ORDER — FUROSEMIDE 40 MG/1
TABLET ORAL
Qty: 90 TABLET | Refills: 3 | Status: SHIPPED | OUTPATIENT
Start: 2019-01-01

## 2019-01-01 RX ORDER — METOPROLOL SUCCINATE 50 MG/1
50 TABLET, EXTENDED RELEASE ORAL DAILY
Qty: 30 TABLET | Refills: 11 | Status: SHIPPED | OUTPATIENT
Start: 2019-01-01

## 2019-01-01 RX ORDER — ONDANSETRON 8 MG/1
TABLET, ORALLY DISINTEGRATING ORAL
Qty: 20 TABLET | Refills: 0 | OUTPATIENT
Start: 2019-01-01

## 2019-01-01 RX ORDER — PANTOPRAZOLE SODIUM 40 MG/1
40 TABLET, DELAYED RELEASE ORAL DAILY
Qty: 90 TABLET | Refills: 3 | Status: SHIPPED | OUTPATIENT
Start: 2019-01-01

## 2019-01-01 RX ORDER — HYDRALAZINE HYDROCHLORIDE 20 MG/ML
5 INJECTION INTRAMUSCULAR; INTRAVENOUS EVERY 10 MIN PRN
Status: DISCONTINUED | OUTPATIENT
Start: 2019-01-01 | End: 2019-01-01 | Stop reason: HOSPADM

## 2019-01-01 RX ORDER — CEFEPIME HYDROCHLORIDE 2 G/50ML
2 INJECTION, SOLUTION INTRAVENOUS EVERY 12 HOURS
Status: DISCONTINUED | OUTPATIENT
Start: 2019-01-01 | End: 2019-01-01 | Stop reason: SDUPTHER

## 2019-01-01 RX ORDER — DIPHENHYDRAMINE HYDROCHLORIDE 50 MG/ML
12.5 INJECTION INTRAMUSCULAR; INTRAVENOUS
Status: DISCONTINUED | OUTPATIENT
Start: 2019-01-01 | End: 2019-01-01 | Stop reason: HOSPADM

## 2019-01-01 RX ORDER — METHADONE HYDROCHLORIDE 10 MG/1
80 TABLET ORAL DAILY
Status: DISCONTINUED | OUTPATIENT
Start: 2019-01-01 | End: 2019-01-01 | Stop reason: HOSPADM

## 2019-01-01 RX ORDER — LABETALOL 20 MG/4 ML (5 MG/ML) INTRAVENOUS SYRINGE
5 EVERY 10 MIN PRN
Status: DISCONTINUED | OUTPATIENT
Start: 2019-01-01 | End: 2019-01-01 | Stop reason: HOSPADM

## 2019-01-01 RX ORDER — POTASSIUM CHLORIDE 20 MEQ/1
40 TABLET, EXTENDED RELEASE ORAL ONCE
Status: COMPLETED | OUTPATIENT
Start: 2019-01-01 | End: 2019-01-01

## 2019-01-01 RX ORDER — GABAPENTIN 800 MG/1
TABLET ORAL
Qty: 120 TABLET | OUTPATIENT
Start: 2019-01-01 | End: 2019-01-01

## 2019-01-01 RX ORDER — TRAZODONE HYDROCHLORIDE 100 MG/1
TABLET ORAL
Qty: 60 TABLET | Refills: 5 | Status: SHIPPED | OUTPATIENT
Start: 2019-01-01 | End: 2019-01-01

## 2019-01-01 RX ORDER — ONDANSETRON 2 MG/ML
INJECTION INTRAMUSCULAR; INTRAVENOUS PRN
Status: DISCONTINUED | OUTPATIENT
Start: 2019-01-01 | End: 2019-01-01 | Stop reason: SDUPTHER

## 2019-01-01 RX ORDER — CYCLOBENZAPRINE HCL 10 MG
TABLET ORAL
Qty: 21 TABLET | Refills: 0 | Status: SHIPPED | OUTPATIENT
Start: 2019-01-01

## 2019-01-01 RX ORDER — SODIUM CHLORIDE 0.9 % (FLUSH) 0.9 %
10 SYRINGE (ML) INJECTION PRN
Status: DISCONTINUED | OUTPATIENT
Start: 2019-01-01 | End: 2019-01-01 | Stop reason: SDUPTHER

## 2019-01-01 RX ORDER — FENTANYL CITRATE 50 UG/ML
50 INJECTION, SOLUTION INTRAMUSCULAR; INTRAVENOUS
Status: DISCONTINUED | OUTPATIENT
Start: 2019-01-01 | End: 2019-01-01 | Stop reason: HOSPADM

## 2019-01-01 RX ORDER — VENLAFAXINE HYDROCHLORIDE 150 MG/1
150 CAPSULE, EXTENDED RELEASE ORAL DAILY
Qty: 30 CAPSULE | Refills: 11 | Status: SHIPPED | OUTPATIENT
Start: 2019-01-01 | End: 2019-01-01 | Stop reason: SDUPTHER

## 2019-01-01 RX ORDER — AMOXICILLIN AND CLAVULANATE POTASSIUM 875; 125 MG/1; MG/1
1 TABLET, FILM COATED ORAL 2 TIMES DAILY
Qty: 20 TABLET | Refills: 0 | Status: SHIPPED | OUTPATIENT
Start: 2019-01-01 | End: 2019-01-01

## 2019-01-01 RX ORDER — QUETIAPINE FUMARATE 50 MG/1
100 TABLET, FILM COATED ORAL EVERY EVENING
Qty: 60 TABLET | Refills: 3 | Status: SHIPPED | OUTPATIENT
Start: 2019-01-01 | End: 2019-01-01 | Stop reason: SDUPTHER

## 2019-01-01 RX ORDER — MORPHINE SULFATE 4 MG/ML
4 INJECTION, SOLUTION INTRAMUSCULAR; INTRAVENOUS
Status: DISCONTINUED | OUTPATIENT
Start: 2019-01-01 | End: 2019-01-01 | Stop reason: HOSPADM

## 2019-01-01 RX ORDER — ONDANSETRON 4 MG/1
TABLET, ORALLY DISINTEGRATING ORAL
Qty: 20 TABLET | Refills: 0 | Status: SHIPPED | OUTPATIENT
Start: 2019-01-01 | End: 2019-01-01 | Stop reason: ALTCHOICE

## 2019-01-01 RX ORDER — PROMETHAZINE HYDROCHLORIDE 12.5 MG/1
12.5 TABLET ORAL 3 TIMES DAILY PRN
Qty: 12 TABLET | Refills: 0 | Status: SHIPPED | OUTPATIENT
Start: 2019-01-01 | End: 2019-01-01

## 2019-01-01 RX ORDER — METHADONE HYDROCHLORIDE 40 MG/1
60 TABLET ORAL DAILY
Status: DISCONTINUED | OUTPATIENT
Start: 2019-01-01 | End: 2019-01-01

## 2019-01-01 RX ORDER — CYCLOBENZAPRINE HCL 10 MG
10 TABLET ORAL 3 TIMES DAILY PRN
Qty: 21 TABLET | Refills: 0 | Status: SHIPPED | OUTPATIENT
Start: 2019-01-01 | End: 2019-01-01 | Stop reason: SDUPTHER

## 2019-01-01 RX ORDER — ONDANSETRON 4 MG/1
4 TABLET, ORALLY DISINTEGRATING ORAL 3 TIMES DAILY PRN
Status: DISCONTINUED | OUTPATIENT
Start: 2019-01-01 | End: 2019-01-01 | Stop reason: HOSPADM

## 2019-01-01 RX ORDER — QUETIAPINE FUMARATE 100 MG/1
200 TABLET, FILM COATED ORAL NIGHTLY
Qty: 60 TABLET | Refills: 0 | Status: SHIPPED | OUTPATIENT
Start: 2019-01-01 | End: 2019-01-01 | Stop reason: SDUPTHER

## 2019-01-01 RX ORDER — GABAPENTIN 800 MG/1
TABLET ORAL
Qty: 120 TABLET | OUTPATIENT
Start: 2019-01-01

## 2019-01-01 RX ORDER — OXYCODONE AND ACETAMINOPHEN 10; 325 MG/1; MG/1
1 TABLET ORAL EVERY 4 HOURS PRN
Qty: 70 TABLET | Refills: 0 | Status: SHIPPED | OUTPATIENT
Start: 2019-01-01 | End: 2019-01-01

## 2019-01-01 RX ORDER — MORPHINE SULFATE 4 MG/ML
2 INJECTION, SOLUTION INTRAMUSCULAR; INTRAVENOUS EVERY 5 MIN PRN
Status: DISCONTINUED | OUTPATIENT
Start: 2019-01-01 | End: 2019-01-01 | Stop reason: HOSPADM

## 2019-01-01 RX ORDER — PROMETHAZINE HYDROCHLORIDE 25 MG/ML
6.25 INJECTION, SOLUTION INTRAMUSCULAR; INTRAVENOUS ONCE
Status: COMPLETED | OUTPATIENT
Start: 2019-01-01 | End: 2019-01-01

## 2019-01-01 RX ORDER — MELOXICAM 15 MG/1
15 TABLET ORAL DAILY
COMMUNITY

## 2019-01-01 RX ORDER — DOCUSATE SODIUM 100 MG/1
100 CAPSULE, LIQUID FILLED ORAL 2 TIMES DAILY
Status: DISCONTINUED | OUTPATIENT
Start: 2019-01-01 | End: 2019-01-01 | Stop reason: HOSPADM

## 2019-01-01 RX ORDER — HYDROXYZINE HYDROCHLORIDE 25 MG/1
25 TABLET, FILM COATED ORAL 3 TIMES DAILY PRN
Qty: 30 TABLET | Refills: 0 | Status: SHIPPED | OUTPATIENT
Start: 2019-01-01 | End: 2019-01-01

## 2019-01-01 RX ORDER — GABAPENTIN 800 MG/1
800 TABLET ORAL 4 TIMES DAILY
Qty: 120 TABLET | Refills: 5 | Status: SHIPPED | OUTPATIENT
Start: 2019-01-01 | End: 2019-01-01 | Stop reason: SDUPTHER

## 2019-01-01 RX ORDER — MIDAZOLAM HYDROCHLORIDE 1 MG/ML
2 INJECTION INTRAMUSCULAR; INTRAVENOUS
Status: DISCONTINUED | OUTPATIENT
Start: 2019-01-01 | End: 2019-01-01 | Stop reason: HOSPADM

## 2019-01-01 RX ORDER — METHADONE HYDROCHLORIDE 40 MG/1
80 TABLET ORAL DAILY
Status: DISCONTINUED | OUTPATIENT
Start: 2019-01-01 | End: 2019-01-01 | Stop reason: SDUPTHER

## 2019-01-01 RX ORDER — ASPIRIN 325 MG
325 TABLET, DELAYED RELEASE (ENTERIC COATED) ORAL 2 TIMES DAILY
Qty: 84 TABLET | Refills: 0 | Status: SHIPPED | OUTPATIENT
Start: 2019-01-01 | End: 2020-01-01

## 2019-01-01 RX ORDER — METOCLOPRAMIDE HYDROCHLORIDE 5 MG/ML
10 INJECTION INTRAMUSCULAR; INTRAVENOUS ONCE
Status: COMPLETED | OUTPATIENT
Start: 2019-01-01 | End: 2019-01-01

## 2019-01-01 RX ORDER — SENNA PLUS 8.6 MG/1
1 TABLET ORAL DAILY PRN
Status: DISCONTINUED | OUTPATIENT
Start: 2019-01-01 | End: 2019-01-01 | Stop reason: HOSPADM

## 2019-01-01 RX ORDER — TRAZODONE HYDROCHLORIDE 100 MG/1
TABLET ORAL
Qty: 60 TABLET | Refills: 5 | Status: ON HOLD | OUTPATIENT
Start: 2019-01-01 | End: 2019-01-01 | Stop reason: ALTCHOICE

## 2019-01-01 RX ORDER — NICOTINE 21 MG/24HR
1 PATCH, TRANSDERMAL 24 HOURS TRANSDERMAL DAILY
Status: DISCONTINUED | OUTPATIENT
Start: 2019-01-01 | End: 2019-01-01

## 2019-01-01 RX ORDER — DIPHENHYDRAMINE HYDROCHLORIDE 50 MG/ML
25 INJECTION INTRAMUSCULAR; INTRAVENOUS ONCE
Status: COMPLETED | OUTPATIENT
Start: 2019-01-01 | End: 2019-01-01

## 2019-01-01 RX ORDER — MIDAZOLAM HYDROCHLORIDE 1 MG/ML
2 INJECTION INTRAMUSCULAR; INTRAVENOUS ONCE
Status: COMPLETED | OUTPATIENT
Start: 2019-01-01 | End: 2019-01-01

## 2019-01-01 RX ORDER — DEXTROMETHORPHAN HYDROBROMIDE AND PROMETHAZINE HYDROCHLORIDE 15; 6.25 MG/5ML; MG/5ML
5 SYRUP ORAL 4 TIMES DAILY PRN
Qty: 240 ML | Refills: 0 | Status: SHIPPED | OUTPATIENT
Start: 2019-01-01 | End: 2019-01-01

## 2019-01-01 RX ORDER — ACETAMINOPHEN 325 MG/1
650 TABLET ORAL EVERY 8 HOURS PRN
Status: DISCONTINUED | OUTPATIENT
Start: 2019-01-01 | End: 2019-01-01 | Stop reason: HOSPADM

## 2019-01-01 RX ORDER — ATORVASTATIN CALCIUM 20 MG/1
20 TABLET, FILM COATED ORAL DAILY
COMMUNITY

## 2019-01-01 RX ORDER — ONDANSETRON 4 MG/1
4 TABLET, ORALLY DISINTEGRATING ORAL EVERY 8 HOURS PRN
Qty: 20 TABLET | Refills: 0 | Status: ON HOLD | OUTPATIENT
Start: 2019-01-01 | End: 2019-01-01 | Stop reason: SDUPTHER

## 2019-01-01 RX ORDER — ONDANSETRON 4 MG/1
4 TABLET, ORALLY DISINTEGRATING ORAL EVERY 12 HOURS PRN
Qty: 20 TABLET | Refills: 0 | Status: SHIPPED | OUTPATIENT
Start: 2019-01-01 | End: 2020-01-01

## 2019-01-01 RX ORDER — DEXAMETHASONE SODIUM PHOSPHATE 10 MG/ML
INJECTION INTRAMUSCULAR; INTRAVENOUS PRN
Status: DISCONTINUED | OUTPATIENT
Start: 2019-01-01 | End: 2019-01-01 | Stop reason: SDUPTHER

## 2019-01-01 RX ORDER — CLONAZEPAM 0.5 MG/1
0.5 TABLET ORAL 2 TIMES DAILY PRN
Qty: 30 TABLET | Refills: 0 | Status: SHIPPED | OUTPATIENT
Start: 2019-01-01 | End: 2020-01-11

## 2019-01-01 RX ORDER — METHADONE HYDROCHLORIDE 40 MG/1
120 TABLET ORAL DAILY
Status: DISCONTINUED | OUTPATIENT
Start: 2019-01-01 | End: 2019-01-01

## 2019-01-01 RX ORDER — PROMETHAZINE HYDROCHLORIDE 25 MG/ML
6.25 INJECTION, SOLUTION INTRAMUSCULAR; INTRAVENOUS
Status: DISCONTINUED | OUTPATIENT
Start: 2019-01-01 | End: 2019-01-01 | Stop reason: HOSPADM

## 2019-01-01 RX ORDER — TRAZODONE HYDROCHLORIDE 50 MG/1
50 TABLET ORAL NIGHTLY
Status: DISCONTINUED | OUTPATIENT
Start: 2019-01-01 | End: 2019-01-01 | Stop reason: HOSPADM

## 2019-01-01 RX ORDER — GABAPENTIN 800 MG/1
800 TABLET ORAL 4 TIMES DAILY
Qty: 120 TABLET | Refills: 2 | Status: SHIPPED | OUTPATIENT
Start: 2019-01-01 | End: 2019-01-01

## 2019-01-01 RX ORDER — DOCUSATE SODIUM 100 MG/1
100 CAPSULE, LIQUID FILLED ORAL 2 TIMES DAILY
Qty: 60 CAPSULE | Refills: 1 | Status: SHIPPED | OUTPATIENT
Start: 2019-01-01 | End: 2019-01-01

## 2019-01-01 RX ORDER — BUSPIRONE HYDROCHLORIDE 10 MG/1
10 TABLET ORAL 3 TIMES DAILY
Qty: 30 TABLET | Refills: 0 | Status: SHIPPED | OUTPATIENT
Start: 2019-01-01 | End: 2019-01-01

## 2019-01-01 RX ORDER — POTASSIUM CHLORIDE 7.45 MG/ML
20 INJECTION INTRAVENOUS ONCE
Status: COMPLETED | OUTPATIENT
Start: 2019-01-01 | End: 2019-01-01

## 2019-01-01 RX ORDER — OXYCODONE HYDROCHLORIDE AND ACETAMINOPHEN 5; 325 MG/1; MG/1
1 TABLET ORAL ONCE
Status: COMPLETED | OUTPATIENT
Start: 2019-01-01 | End: 2019-01-01

## 2019-01-01 RX ORDER — METHADONE HYDROCHLORIDE 10 MG/1
40 TABLET ORAL DAILY
Status: DISCONTINUED | OUTPATIENT
Start: 2019-01-01 | End: 2019-01-01

## 2019-01-01 RX ORDER — ATORVASTATIN CALCIUM 20 MG/1
20 TABLET, FILM COATED ORAL NIGHTLY
Status: DISCONTINUED | OUTPATIENT
Start: 2019-01-01 | End: 2019-01-01 | Stop reason: HOSPADM

## 2019-01-01 RX ORDER — NICOTINE 21 MG/24HR
1 PATCH, TRANSDERMAL 24 HOURS TRANSDERMAL DAILY
Status: DISCONTINUED | OUTPATIENT
Start: 2019-01-01 | End: 2019-01-01 | Stop reason: HOSPADM

## 2019-01-01 RX ORDER — GABAPENTIN 800 MG/1
TABLET ORAL
Qty: 120 TABLET | Refills: 0 | Status: SHIPPED | OUTPATIENT
Start: 2019-01-01 | End: 2019-01-01 | Stop reason: SDUPTHER

## 2019-01-01 RX ORDER — GABAPENTIN 800 MG/1
800 TABLET ORAL 4 TIMES DAILY
Qty: 120 TABLET | Refills: 0 | Status: SHIPPED | OUTPATIENT
Start: 2019-01-01 | End: 2019-01-01 | Stop reason: SDUPTHER

## 2019-01-01 RX ORDER — METOPROLOL SUCCINATE 50 MG/1
TABLET, EXTENDED RELEASE ORAL
Qty: 30 TABLET | Refills: 11 | OUTPATIENT
Start: 2019-01-01

## 2019-01-01 RX ORDER — MORPHINE SULFATE 4 MG/ML
4 INJECTION, SOLUTION INTRAMUSCULAR; INTRAVENOUS EVERY 5 MIN PRN
Status: DISCONTINUED | OUTPATIENT
Start: 2019-01-01 | End: 2019-01-01 | Stop reason: HOSPADM

## 2019-01-01 RX ORDER — BISACODYL 10 MG
10 SUPPOSITORY, RECTAL RECTAL DAILY PRN
Status: DISCONTINUED | OUTPATIENT
Start: 2019-01-01 | End: 2019-01-01 | Stop reason: HOSPADM

## 2019-01-01 RX ORDER — FUROSEMIDE 40 MG/1
40 TABLET ORAL DAILY
Status: DISCONTINUED | OUTPATIENT
Start: 2019-01-01 | End: 2019-01-01 | Stop reason: HOSPADM

## 2019-01-01 RX ORDER — MORPHINE SULFATE 4 MG/ML
2 INJECTION, SOLUTION INTRAMUSCULAR; INTRAVENOUS ONCE
Status: COMPLETED | OUTPATIENT
Start: 2019-01-01 | End: 2019-01-01

## 2019-01-01 RX ORDER — HYDROXYZINE HYDROCHLORIDE 25 MG/1
25 TABLET, FILM COATED ORAL 3 TIMES DAILY PRN
Status: DISCONTINUED | OUTPATIENT
Start: 2019-01-01 | End: 2019-01-01 | Stop reason: HOSPADM

## 2019-01-01 RX ORDER — GABAPENTIN 800 MG/1
800 TABLET ORAL 4 TIMES DAILY
Qty: 120 TABLET | Refills: 2 | Status: SHIPPED | OUTPATIENT
Start: 2019-01-01 | End: 2020-03-16

## 2019-01-01 RX ORDER — GENTAMICIN SULFATE 3 MG/ML
1 SOLUTION/ DROPS OPHTHALMIC ONCE
Status: COMPLETED | OUTPATIENT
Start: 2019-01-01 | End: 2019-01-01

## 2019-01-01 RX ADMIN — HYDROXYZINE PAMOATE 50 MG: 25 CAPSULE ORAL at 19:44

## 2019-01-01 RX ADMIN — ASPIRIN 325 MG: 325 TABLET, DELAYED RELEASE ORAL at 07:50

## 2019-01-01 RX ADMIN — ONDANSETRON HYDROCHLORIDE 4 MG: 2 INJECTION, SOLUTION INTRAMUSCULAR; INTRAVENOUS at 14:00

## 2019-01-01 RX ADMIN — METOPROLOL SUCCINATE 50 MG: 50 TABLET, EXTENDED RELEASE ORAL at 08:02

## 2019-01-01 RX ADMIN — GABAPENTIN 800 MG: 400 CAPSULE ORAL at 08:02

## 2019-01-01 RX ADMIN — GABAPENTIN 800 MG: 400 CAPSULE ORAL at 21:29

## 2019-01-01 RX ADMIN — Medication 2 G: at 02:45

## 2019-01-01 RX ADMIN — POTASSIUM CHLORIDE 40 MEQ: 20 TABLET, EXTENDED RELEASE ORAL at 14:16

## 2019-01-01 RX ADMIN — ONDANSETRON 4 MG: 4 TABLET, ORALLY DISINTEGRATING ORAL at 18:45

## 2019-01-01 RX ADMIN — SODIUM CHLORIDE, SODIUM LACTATE, POTASSIUM CHLORIDE, AND CALCIUM CHLORIDE: 600; 310; 30; 20 INJECTION, SOLUTION INTRAVENOUS at 19:52

## 2019-01-01 RX ADMIN — VENLAFAXINE HYDROCHLORIDE 150 MG: 75 CAPSULE, EXTENDED RELEASE ORAL at 08:17

## 2019-01-01 RX ADMIN — HYDROMORPHONE HYDROCHLORIDE 0.5 MG: 1 INJECTION, SOLUTION INTRAMUSCULAR; INTRAVENOUS; SUBCUTANEOUS at 19:50

## 2019-01-01 RX ADMIN — DOCUSATE SODIUM 100 MG: 100 CAPSULE, LIQUID FILLED ORAL at 07:50

## 2019-01-01 RX ADMIN — METHADONE HYDROCHLORIDE 120 MG: 10 TABLET ORAL at 12:20

## 2019-01-01 RX ADMIN — Medication 3 MG: at 21:05

## 2019-01-01 RX ADMIN — Medication 500 MG: at 00:46

## 2019-01-01 RX ADMIN — HALOPERIDOL LACTATE 5 MG: 5 INJECTION INTRAMUSCULAR at 11:33

## 2019-01-01 RX ADMIN — ACETAMINOPHEN 650 MG: 325 TABLET ORAL at 05:09

## 2019-01-01 RX ADMIN — QUETIAPINE FUMARATE 100 MG: 100 TABLET, FILM COATED ORAL at 02:11

## 2019-01-01 RX ADMIN — PANTOPRAZOLE SODIUM 40 MG: 40 TABLET, DELAYED RELEASE ORAL at 11:07

## 2019-01-01 RX ADMIN — METOPROLOL SUCCINATE 50 MG: 50 TABLET, EXTENDED RELEASE ORAL at 07:50

## 2019-01-01 RX ADMIN — SODIUM CHLORIDE, SODIUM LACTATE, POTASSIUM CHLORIDE, AND CALCIUM CHLORIDE: 600; 310; 30; 20 INJECTION, SOLUTION INTRAVENOUS at 18:22

## 2019-01-01 RX ADMIN — SODIUM CHLORIDE: 9 INJECTION, SOLUTION INTRAVENOUS at 02:44

## 2019-01-01 RX ADMIN — METOPROLOL SUCCINATE 50 MG: 50 TABLET, EXTENDED RELEASE ORAL at 11:07

## 2019-01-01 RX ADMIN — LORAZEPAM 1 MG: 2 INJECTION INTRAMUSCULAR; INTRAVENOUS at 10:43

## 2019-01-01 RX ADMIN — HYDROMORPHONE HYDROCHLORIDE 1 MG: 1 INJECTION, SOLUTION INTRAMUSCULAR; INTRAVENOUS; SUBCUTANEOUS at 09:28

## 2019-01-01 RX ADMIN — ONDANSETRON 4 MG: 4 TABLET, ORALLY DISINTEGRATING ORAL at 05:27

## 2019-01-01 RX ADMIN — MORPHINE SULFATE 2 MG: 4 INJECTION, SOLUTION INTRAMUSCULAR; INTRAVENOUS at 11:10

## 2019-01-01 RX ADMIN — MIDAZOLAM 2 MG: 1 INJECTION INTRAMUSCULAR; INTRAVENOUS at 18:10

## 2019-01-01 RX ADMIN — ATORVASTATIN CALCIUM 20 MG: 10 TABLET, FILM COATED ORAL at 08:02

## 2019-01-01 RX ADMIN — Medication 10 ML: at 07:53

## 2019-01-01 RX ADMIN — PANTOPRAZOLE SODIUM 40 MG: 40 TABLET, DELAYED RELEASE ORAL at 07:50

## 2019-01-01 RX ADMIN — LORAZEPAM 2 MG: 2 INJECTION INTRAMUSCULAR; INTRAVENOUS at 14:45

## 2019-01-01 RX ADMIN — BUSPIRONE HYDROCHLORIDE 10 MG: 10 TABLET ORAL at 21:29

## 2019-01-01 RX ADMIN — ASPIRIN 325 MG: 325 TABLET, DELAYED RELEASE ORAL at 23:24

## 2019-01-01 RX ADMIN — OXYCODONE HYDROCHLORIDE AND ACETAMINOPHEN 1 TABLET: 5; 325 TABLET ORAL at 14:32

## 2019-01-01 RX ADMIN — SODIUM CHLORIDE: 9 INJECTION, SOLUTION INTRAVENOUS at 01:16

## 2019-01-01 RX ADMIN — POTASSIUM CHLORIDE 20 MEQ: 7.46 INJECTION, SOLUTION INTRAVENOUS at 14:16

## 2019-01-01 RX ADMIN — METHADONE HYDROCHLORIDE 80 MG: 10 TABLET ORAL at 11:06

## 2019-01-01 RX ADMIN — ACETAMINOPHEN 650 MG: 325 TABLET ORAL at 05:53

## 2019-01-01 RX ADMIN — HYDROXYZINE HYDROCHLORIDE 25 MG: 25 TABLET, FILM COATED ORAL at 11:07

## 2019-01-01 RX ADMIN — HYDROMORPHONE HYDROCHLORIDE 1 MG: 1 INJECTION, SOLUTION INTRAMUSCULAR; INTRAVENOUS; SUBCUTANEOUS at 04:27

## 2019-01-01 RX ADMIN — GABAPENTIN 600 MG: 600 TABLET, FILM COATED ORAL at 07:50

## 2019-01-01 RX ADMIN — FUROSEMIDE 40 MG: 40 TABLET ORAL at 08:02

## 2019-01-01 RX ADMIN — CYCLOBENZAPRINE 10 MG: 10 TABLET, FILM COATED ORAL at 07:26

## 2019-01-01 RX ADMIN — GABAPENTIN 800 MG: 400 CAPSULE ORAL at 12:06

## 2019-01-01 RX ADMIN — ONDANSETRON 4 MG: 2 INJECTION INTRAMUSCULAR; INTRAVENOUS at 11:01

## 2019-01-01 RX ADMIN — HYDROMORPHONE HYDROCHLORIDE 1 MG: 1 INJECTION, SOLUTION INTRAMUSCULAR; INTRAVENOUS; SUBCUTANEOUS at 17:18

## 2019-01-01 RX ADMIN — METHADONE HYDROCHLORIDE 120 MG: 10 TABLET ORAL at 07:49

## 2019-01-01 RX ADMIN — BUSPIRONE HYDROCHLORIDE 10 MG: 10 TABLET ORAL at 11:07

## 2019-01-01 RX ADMIN — TRAZODONE HYDROCHLORIDE 50 MG: 50 TABLET ORAL at 21:29

## 2019-01-01 RX ADMIN — ONDANSETRON 4 MG: 4 TABLET, ORALLY DISINTEGRATING ORAL at 14:03

## 2019-01-01 RX ADMIN — ONDANSETRON HYDROCHLORIDE 4 MG: 4 TABLET, FILM COATED ORAL at 18:32

## 2019-01-01 RX ADMIN — HYDROMORPHONE HYDROCHLORIDE 0.5 MG: 1 INJECTION, SOLUTION INTRAMUSCULAR; INTRAVENOUS; SUBCUTANEOUS at 18:50

## 2019-01-01 RX ADMIN — PROPOFOL 200 MG: 10 INJECTION, EMULSION INTRAVENOUS at 18:38

## 2019-01-01 RX ADMIN — ASPIRIN 325 MG: 325 TABLET, COATED ORAL at 08:02

## 2019-01-01 RX ADMIN — TRAZODONE HYDROCHLORIDE 50 MG: 50 TABLET ORAL at 21:05

## 2019-01-01 RX ADMIN — EPHEDRINE SULFATE 15 MG: 50 INJECTION INTRAMUSCULAR; INTRAVENOUS; SUBCUTANEOUS at 19:30

## 2019-01-01 RX ADMIN — GABAPENTIN 800 MG: 400 CAPSULE ORAL at 20:09

## 2019-01-01 RX ADMIN — GENTAMICIN SULFATE 1 DROP: 3 SOLUTION OPHTHALMIC at 00:46

## 2019-01-01 RX ADMIN — ONDANSETRON 4 MG: 4 TABLET, ORALLY DISINTEGRATING ORAL at 18:50

## 2019-01-01 RX ADMIN — HYDROMORPHONE HYDROCHLORIDE 1 MG: 1 INJECTION, SOLUTION INTRAMUSCULAR; INTRAVENOUS; SUBCUTANEOUS at 11:51

## 2019-01-01 RX ADMIN — HYDROMORPHONE HYDROCHLORIDE 1 MG: 1 INJECTION, SOLUTION INTRAMUSCULAR; INTRAVENOUS; SUBCUTANEOUS at 00:16

## 2019-01-01 RX ADMIN — OXYCODONE HYDROCHLORIDE 5 MG: 5 TABLET ORAL at 02:47

## 2019-01-01 RX ADMIN — DEXAMETHASONE SODIUM PHOSPHATE 10 MG: 10 INJECTION INTRAMUSCULAR; INTRAVENOUS at 18:44

## 2019-01-01 RX ADMIN — DOCUSATE SODIUM 100 MG: 100 CAPSULE, LIQUID FILLED ORAL at 23:24

## 2019-01-01 RX ADMIN — HYDROXYZINE PAMOATE 50 MG: 25 CAPSULE ORAL at 21:05

## 2019-01-01 RX ADMIN — SODIUM CHLORIDE 1000 ML: 9 INJECTION, SOLUTION INTRAVENOUS at 19:30

## 2019-01-01 RX ADMIN — ENOXAPARIN SODIUM 40 MG: 40 INJECTION SUBCUTANEOUS at 08:18

## 2019-01-01 RX ADMIN — LAMOTRIGINE 25 MG: 25 TABLET ORAL at 07:50

## 2019-01-01 RX ADMIN — DIPHENHYDRAMINE HYDROCHLORIDE 25 MG: 50 INJECTION, SOLUTION INTRAMUSCULAR; INTRAVENOUS at 11:34

## 2019-01-01 RX ADMIN — BUSPIRONE HYDROCHLORIDE 10 MG: 10 TABLET ORAL at 08:18

## 2019-01-01 RX ADMIN — NAPHAZOLINE HYDROCHLORIDE AND PHENIRAMINE MALEATE 1 DROP: .25; 3 SOLUTION/ DROPS OPHTHALMIC at 08:18

## 2019-01-01 RX ADMIN — ONDANSETRON 4 MG: 2 INJECTION INTRAMUSCULAR; INTRAVENOUS at 19:32

## 2019-01-01 RX ADMIN — HYDROXYZINE HYDROCHLORIDE 25 MG: 25 TABLET, FILM COATED ORAL at 17:15

## 2019-01-01 RX ADMIN — SODIUM CHLORIDE 1000 ML: 9 INJECTION, SOLUTION INTRAVENOUS at 22:48

## 2019-01-01 RX ADMIN — VENLAFAXINE HYDROCHLORIDE 150 MG: 75 CAPSULE, EXTENDED RELEASE ORAL at 07:51

## 2019-01-01 RX ADMIN — ONDANSETRON 4 MG: 4 TABLET, ORALLY DISINTEGRATING ORAL at 09:51

## 2019-01-01 RX ADMIN — METOPROLOL SUCCINATE 50 MG: 50 TABLET, EXTENDED RELEASE ORAL at 08:18

## 2019-01-01 RX ADMIN — GABAPENTIN 800 MG: 400 CAPSULE ORAL at 08:17

## 2019-01-01 RX ADMIN — ONDANSETRON HYDROCHLORIDE 4 MG: 2 INJECTION, SOLUTION INTRAMUSCULAR; INTRAVENOUS at 19:39

## 2019-01-01 RX ADMIN — METOCLOPRAMIDE 10 MG: 5 INJECTION, SOLUTION INTRAMUSCULAR; INTRAVENOUS at 14:00

## 2019-01-01 RX ADMIN — LORAZEPAM 1 MG: 2 INJECTION INTRAMUSCULAR; INTRAVENOUS at 17:38

## 2019-01-01 RX ADMIN — BUSPIRONE HYDROCHLORIDE 10 MG: 10 TABLET ORAL at 13:24

## 2019-01-01 RX ADMIN — Medication 6 MG: at 21:29

## 2019-01-01 RX ADMIN — HYDROMORPHONE HYDROCHLORIDE 1 MG: 1 INJECTION, SOLUTION INTRAMUSCULAR; INTRAVENOUS; SUBCUTANEOUS at 01:41

## 2019-01-01 RX ADMIN — SODIUM CHLORIDE, POTASSIUM CHLORIDE, SODIUM LACTATE AND CALCIUM CHLORIDE 1000 ML: 600; 310; 30; 20 INJECTION, SOLUTION INTRAVENOUS at 11:01

## 2019-01-01 RX ADMIN — GABAPENTIN 800 MG: 400 CAPSULE ORAL at 17:15

## 2019-01-01 RX ADMIN — SODIUM CHLORIDE, PRESERVATIVE FREE 2 G: 5 INJECTION INTRAVENOUS at 00:45

## 2019-01-01 RX ADMIN — LIDOCAINE HYDROCHLORIDE 50 MG: 10 INJECTION, SOLUTION INFILTRATION; PERINEURAL at 18:38

## 2019-01-01 RX ADMIN — PROMETHAZINE HYDROCHLORIDE 6.25 MG: 25 INJECTION INTRAMUSCULAR; INTRAVENOUS at 14:32

## 2019-01-01 RX ADMIN — CEFAZOLIN 2000 MG: 330 INJECTION, POWDER, FOR SOLUTION INTRAMUSCULAR; INTRAVENOUS at 18:45

## 2019-01-01 RX ADMIN — ACETAMINOPHEN 1000 MG: 500 TABLET, FILM COATED ORAL at 19:32

## 2019-01-01 RX ADMIN — LORAZEPAM 2 MG: 2 INJECTION INTRAMUSCULAR; INTRAVENOUS at 00:16

## 2019-01-01 RX ADMIN — VENLAFAXINE HYDROCHLORIDE 150 MG: 75 CAPSULE, EXTENDED RELEASE ORAL at 11:05

## 2019-01-01 RX ADMIN — FENTANYL CITRATE 100 MCG: 50 INJECTION INTRAMUSCULAR; INTRAVENOUS at 18:38

## 2019-01-01 RX ADMIN — LORAZEPAM 1 MG: 2 INJECTION INTRAMUSCULAR; INTRAVENOUS at 03:35

## 2019-01-01 RX ADMIN — METOPROLOL SUCCINATE 50 MG: 50 TABLET, EXTENDED RELEASE ORAL at 07:32

## 2019-01-01 RX ADMIN — METHADONE HYDROCHLORIDE 80 MG: 10 TABLET ORAL at 08:18

## 2019-01-01 RX ADMIN — GABAPENTIN 800 MG: 400 CAPSULE ORAL at 13:24

## 2019-01-01 RX ADMIN — OXYCODONE HYDROCHLORIDE 5 MG: 5 TABLET ORAL at 07:23

## 2019-01-01 RX ADMIN — PANTOPRAZOLE SODIUM 40 MG: 40 TABLET, DELAYED RELEASE ORAL at 08:17

## 2019-01-01 RX ADMIN — PANTOPRAZOLE SODIUM 40 MG: 40 TABLET, DELAYED RELEASE ORAL at 08:02

## 2019-01-01 RX ADMIN — HYDROMORPHONE HYDROCHLORIDE 2 MG: 1 INJECTION, SOLUTION INTRAMUSCULAR; INTRAVENOUS; SUBCUTANEOUS at 23:25

## 2019-01-01 RX ADMIN — HYDROMORPHONE HYDROCHLORIDE 1 MG: 1 INJECTION, SOLUTION INTRAMUSCULAR; INTRAVENOUS; SUBCUTANEOUS at 07:30

## 2019-01-01 RX ADMIN — HYDROMORPHONE HYDROCHLORIDE 2 MG: 1 INJECTION, SOLUTION INTRAMUSCULAR; INTRAVENOUS; SUBCUTANEOUS at 22:42

## 2019-01-01 ASSESSMENT — SLEEP AND FATIGUE QUESTIONNAIRES
RESTFUL SLEEP: YES
DIFFICULTY ARISING: NO
DO YOU USE A SLEEP AID: YES
DIFFICULTY STAYING ASLEEP: YES
DIFFICULTY ARISING: YES
DIFFICULTY FALLING ASLEEP: YES
DO YOU HAVE DIFFICULTY SLEEPING: YES
AVERAGE NUMBER OF SLEEP HOURS: 2
SLEEP PATTERN: DIFFICULTY FALLING ASLEEP
DO YOU USE A SLEEP AID: YES
AVERAGE NUMBER OF SLEEP HOURS: 4
SLEEP PATTERN: DIFFICULTY FALLING ASLEEP
RESTFUL SLEEP: NO
DIFFICULTY STAYING ASLEEP: YES
DO YOU HAVE DIFFICULTY SLEEPING: YES
DIFFICULTY FALLING ASLEEP: YES

## 2019-01-01 ASSESSMENT — PAIN DESCRIPTION - LOCATION
LOCATION: ANKLE
LOCATION: ANKLE
LOCATION: LEG
LOCATION: ANKLE
LOCATION: ANKLE
LOCATION: BACK;HIP

## 2019-01-01 ASSESSMENT — PAIN DESCRIPTION - DESCRIPTORS: DESCRIPTORS: ACHING

## 2019-01-01 ASSESSMENT — ENCOUNTER SYMPTOMS
COUGH: 0
WHEEZING: 0
VOMITING: 0
ABDOMINAL PAIN: 0
ABDOMINAL PAIN: 0
HEARTBURN: 0
CONSTIPATION: 0
SHORTNESS OF BREATH: 0
ABDOMINAL DISTENTION: 0
VOMITING: 0
CHEST TIGHTNESS: 0
ABDOMINAL PAIN: 0
NAUSEA: 0
CONSTIPATION: 0
VOMITING: 0
WHEEZING: 0
NAUSEA: 1
CONSTIPATION: 0
COUGH: 0
NAUSEA: 1
APNEA: 0
TROUBLE SWALLOWING: 0
EYES NEGATIVE: 1
SHORTNESS OF BREATH: 0
SHORTNESS OF BREATH: 0
CONSTIPATION: 0
SORE THROAT: 0
SORE THROAT: 0
BLURRED VISION: 0
ABDOMINAL PAIN: 0
SHORTNESS OF BREATH: 0
COUGH: 0
NAUSEA: 0
ABDOMINAL PAIN: 0
VOMITING: 0
ABDOMINAL PAIN: 0
ABDOMINAL PAIN: 0
VOMITING: 1
WHEEZING: 0
EYE REDNESS: 0
EYE PAIN: 0
SORE THROAT: 0
NAUSEA: 0
VOMITING: 1
VOICE CHANGE: 0
ABDOMINAL PAIN: 0
EYE PAIN: 0
BACK PAIN: 1
NAUSEA: 0
COUGH: 0
WHEEZING: 0
DIARRHEA: 0
EYE REDNESS: 0
COUGH: 0
DIARRHEA: 0
CONSTIPATION: 0
DIARRHEA: 0
SHORTNESS OF BREATH: 0
HEMOPTYSIS: 0
COUGH: 1
COUGH: 1
BACK PAIN: 0
SHORTNESS OF BREATH: 0
COUGH: 1
WHEEZING: 0
SPUTUM PRODUCTION: 0
WHEEZING: 0
PHOTOPHOBIA: 0
WHEEZING: 0
DIARRHEA: 0
BACK PAIN: 1
SHORTNESS OF BREATH: 0
BACK PAIN: 1
SHORTNESS OF BREATH: 0
WHEEZING: 0
EYE PAIN: 0
DIARRHEA: 0
NAUSEA: 1
SINUS CONGESTION: 0
BACK PAIN: 0
DIARRHEA: 0
VOMITING: 0
COUGH: 0
SHORTNESS OF BREATH: 0
COUGH: 0
BACK PAIN: 1
BACK PAIN: 0
TROUBLE SWALLOWING: 0
ABDOMINAL PAIN: 0
SHORTNESS OF BREATH: 0
RHINORRHEA: 0
SORE THROAT: 0
VOICE CHANGE: 0
ABDOMINAL PAIN: 0
TROUBLE SWALLOWING: 0
RHINORRHEA: 0
DOUBLE VISION: 0
SHORTNESS OF BREATH: 1
DIARRHEA: 0
SHORTNESS OF BREATH: 0
RHINORRHEA: 0

## 2019-01-01 ASSESSMENT — PAIN DESCRIPTION - PAIN TYPE
TYPE: CHRONIC PAIN
TYPE: ACUTE PAIN
TYPE: ACUTE PAIN
TYPE: SURGICAL PAIN
TYPE: SURGICAL PAIN
TYPE: ACUTE PAIN

## 2019-01-01 ASSESSMENT — PAIN SCALES - GENERAL
PAINLEVEL_OUTOF10: 0
PAINLEVEL_OUTOF10: 10
PAINLEVEL_OUTOF10: 5
PAINLEVEL_OUTOF10: 5
PAINLEVEL_OUTOF10: 9
PAINLEVEL_OUTOF10: 10
PAINLEVEL_OUTOF10: 10
PAINLEVEL_OUTOF10: 5
PAINLEVEL_OUTOF10: 10
PAINLEVEL_OUTOF10: 4
PAINLEVEL_OUTOF10: 10
PAINLEVEL_OUTOF10: 5
PAINLEVEL_OUTOF10: 10
PAINLEVEL_OUTOF10: 7
PAINLEVEL_OUTOF10: 9
PAINLEVEL_OUTOF10: 4
PAINLEVEL_OUTOF10: 10
PAINLEVEL_OUTOF10: 0
PAINLEVEL_OUTOF10: 4
PAINLEVEL_OUTOF10: 10
PAINLEVEL_OUTOF10: 7

## 2019-01-01 ASSESSMENT — PAIN DESCRIPTION - ORIENTATION
ORIENTATION: RIGHT

## 2019-01-01 ASSESSMENT — PATIENT HEALTH QUESTIONNAIRE - PHQ9
SUM OF ALL RESPONSES TO PHQ QUESTIONS 1-9: 18
SUM OF ALL RESPONSES TO PHQ QUESTIONS 1-9: 9

## 2019-01-01 ASSESSMENT — LIFESTYLE VARIABLES
HISTORY_ALCOHOL_USE: NO
SMOKING_STATUS: 1
HISTORY_ALCOHOL_USE: NO

## 2019-01-08 ENCOUNTER — OFFICE VISIT (OUTPATIENT)
Dept: PRIMARY CARE CLINIC | Age: 39
End: 2019-01-08
Payer: MEDICAID

## 2019-01-08 VITALS
DIASTOLIC BLOOD PRESSURE: 80 MMHG | OXYGEN SATURATION: 92 % | HEIGHT: 63 IN | SYSTOLIC BLOOD PRESSURE: 128 MMHG | BODY MASS INDEX: 34.42 KG/M2 | HEART RATE: 104 BPM | WEIGHT: 194.25 LBS | TEMPERATURE: 97.6 F

## 2019-01-08 DIAGNOSIS — F11.20 PATIENT ON METHADONE MAINTENANCE THERAPY (HCC): ICD-10-CM

## 2019-01-08 DIAGNOSIS — Z72.0 TOBACCO ABUSE: ICD-10-CM

## 2019-01-08 DIAGNOSIS — Z98.890 HISTORY OF TRACHEOSTOMY: ICD-10-CM

## 2019-01-08 DIAGNOSIS — R50.9 FEVER, UNSPECIFIED FEVER CAUSE: ICD-10-CM

## 2019-01-08 DIAGNOSIS — J40 BRONCHITIS: Primary | ICD-10-CM

## 2019-01-08 LAB
INFLUENZA A ANTIBODY: NORMAL
INFLUENZA B ANTIBODY: NORMAL

## 2019-01-08 PROCEDURE — 87804 INFLUENZA ASSAY W/OPTIC: CPT | Performed by: NURSE PRACTITIONER

## 2019-01-08 PROCEDURE — G8417 CALC BMI ABV UP PARAM F/U: HCPCS | Performed by: NURSE PRACTITIONER

## 2019-01-08 PROCEDURE — G8484 FLU IMMUNIZE NO ADMIN: HCPCS | Performed by: NURSE PRACTITIONER

## 2019-01-08 PROCEDURE — 4004F PT TOBACCO SCREEN RCVD TLK: CPT | Performed by: NURSE PRACTITIONER

## 2019-01-08 PROCEDURE — G8427 DOCREV CUR MEDS BY ELIG CLIN: HCPCS | Performed by: NURSE PRACTITIONER

## 2019-01-08 PROCEDURE — 96372 THER/PROPH/DIAG INJ SC/IM: CPT | Performed by: NURSE PRACTITIONER

## 2019-01-08 PROCEDURE — 99213 OFFICE O/P EST LOW 20 MIN: CPT | Performed by: NURSE PRACTITIONER

## 2019-01-08 RX ORDER — DEXTROMETHORPHAN HYDROBROMIDE AND PROMETHAZINE HYDROCHLORIDE 15; 6.25 MG/5ML; MG/5ML
5 SYRUP ORAL 4 TIMES DAILY PRN
Qty: 240 ML | Refills: 0 | Status: SHIPPED | OUTPATIENT
Start: 2019-01-08 | End: 2019-01-01 | Stop reason: SDUPTHER

## 2019-01-08 RX ORDER — DEXAMETHASONE SODIUM PHOSPHATE 4 MG/ML
4 INJECTION, SOLUTION INTRA-ARTICULAR; INTRALESIONAL; INTRAMUSCULAR; INTRAVENOUS; SOFT TISSUE ONCE
Status: COMPLETED | OUTPATIENT
Start: 2019-01-08 | End: 2019-01-08

## 2019-01-08 RX ORDER — AMOXICILLIN AND CLAVULANATE POTASSIUM 875; 125 MG/1; MG/1
1 TABLET, FILM COATED ORAL 2 TIMES DAILY
Qty: 28 TABLET | Refills: 0 | Status: SHIPPED | OUTPATIENT
Start: 2019-01-08 | End: 2019-01-01

## 2019-01-08 RX ORDER — TRIAMCINOLONE ACETONIDE 40 MG/ML
40 INJECTION, SUSPENSION INTRA-ARTICULAR; INTRAMUSCULAR ONCE
Status: COMPLETED | OUTPATIENT
Start: 2019-01-08 | End: 2019-01-08

## 2019-01-08 RX ORDER — ONDANSETRON 4 MG/1
4 TABLET, ORALLY DISINTEGRATING ORAL EVERY 8 HOURS PRN
Qty: 20 TABLET | Refills: 0 | Status: ON HOLD | OUTPATIENT
Start: 2019-01-08 | End: 2019-01-01 | Stop reason: HOSPADM

## 2019-01-08 RX ADMIN — DEXAMETHASONE SODIUM PHOSPHATE 4 MG: 4 INJECTION, SOLUTION INTRA-ARTICULAR; INTRALESIONAL; INTRAMUSCULAR; INTRAVENOUS; SOFT TISSUE at 16:50

## 2019-01-08 RX ADMIN — TRIAMCINOLONE ACETONIDE 40 MG: 40 INJECTION, SUSPENSION INTRA-ARTICULAR; INTRAMUSCULAR at 16:51

## 2019-01-08 ASSESSMENT — ENCOUNTER SYMPTOMS
NAUSEA: 0
WHEEZING: 0
EYES NEGATIVE: 1
CHEST TIGHTNESS: 1
ABDOMINAL PAIN: 0
BACK PAIN: 1
SORE THROAT: 1
COUGH: 1
SHORTNESS OF BREATH: 0
TROUBLE SWALLOWING: 0

## 2019-01-24 PROBLEM — F32.2 SEVERE MAJOR DEPRESSION WITHOUT PSYCHOTIC FEATURES (HCC): Status: ACTIVE | Noted: 2019-01-01

## 2019-01-25 PROBLEM — F33.0 MILD EPISODE OF RECURRENT MAJOR DEPRESSIVE DISORDER (HCC): Status: ACTIVE | Noted: 2019-01-01

## 2019-01-25 PROBLEM — F32.2 SEVERE MAJOR DEPRESSION WITHOUT PSYCHOTIC FEATURES (HCC): Status: RESOLVED | Noted: 2019-01-01 | Resolved: 2019-01-01

## 2019-04-17 NOTE — PROGRESS NOTES
Behavioral Health Consultation  Harmeet Phoenix, DESW   4/17/2019  3:39 PM      Time spent with Patient: 30 minutes  This is patient's second  [facility] appointment. Reason for Consult:    Chief Complaint   Patient presents with    Anxiety    Stress     Referring Provider: Aubrey Villatoro, APRN  [facility], [address]      Feedback given to PCP. S:  CC:  Stress  2 nights ago had a seizure. Went to sleep again and it happened twice. Top of head hurt. Legs lorena up and arms legs hurt. Woke up out of a dead sleep and couldn't move. Fatigued yesterday and today. Pt presents as anxious, difficult to sit still in session. Described stressors related to lung issues. Pt shares she has applied for disability; has a realization that \"there is no way I can work being like this. \"   Having allergy symptoms today. Requested to see [facility] for support and to discuss stressors. Pt discussed family dynamics. Doesn't like step father, but shared step father is the person that heard her struggling and retrieved her mother when pt was having a seizure. Pt states methadone tx has worked well for her.       O:  MSE:    Appearance    alert, mild distress  Appetite normal  Sleep disturbance Yes  Fatigue Yes  Loss of pleasure No  Impulsive behavior Yes  Speech    spontaneous and interrupting  Mood    Anxious  Affect    normal affect  Thought Content    intact  Thought Process    coherent and circumstantial  Associations    logical connections  Insight    Fair  Judgment    Impaired  Orientation    oriented to person, place, time, and general circumstances  Memory    recent and remote memory intact  Attention/Concentration    impaired  Morbid ideation No  Suicide Assessment    no suicidal ideation      History:    Medications:   Current Outpatient Medications   Medication Sig Dispense Refill    atorvastatin (LIPITOR) 20 MG tablet Take 1 tablet by mouth nightly 30 tablet 11    ondansetron (ZOFRAN-ODT) 4 MG disintegrating tablet DISSOLVE ONE TABLET ON THE TOP OF TONGUE EVERY 8 HOURS AS NEEDED FOR NAUSEA AND VOMITING 20 tablet 0    Nebulizers ([facility] P[facility] NEBULIZER) MISC 1 applicator by Does not apply route every 4 hours as needed (soa) 1 each 0    albuterol (ACCUNEB) 1.25 MG/3ML nebulizer solution Inhale 3 mLs into the lungs every 6 hours as needed for Wheezing 360 mL 3    QUEtiapine (SEROQUEL) 50 MG tablet Take 2 tablets by mouth every evening 60 tablet 3    gabapentin (NEURONTIN) 800 MG tablet Take 1 tablet by mouth 4 times daily for 30 days. . 120 tablet 5    venlafaxine (EFFEXOR XR) 150 MG extended release capsule Take 1 capsule by mouth daily 30 capsule 11    busPIRone (BUSPAR) 10 MG tablet Take 1 tablet by mouth 3 times daily 30 tablet 0    pantoprazole (PROTONIX) 40 MG tablet Take 1 tablet by mouth daily 30 tablet 11    ondansetron (ZOFRAN ODT) 8 MG TBDP disintegrating tablet Place 1 tablet under the tongue every 8 hours as needed for Nausea or Vomiting 20 tablet 0    furosemide (LASIX) 40 MG tablet Take 1 tablet by mouth daily As needed for swelling 90 tablet 3    metoprolol succinate (TOPROL XL) 50 MG extended release tablet Take 1 tablet by mouth daily TAKE ONE TABLET BY MOUTH DAILY 30 tablet 11    methadone (METHADOSE) 40 MG disintegrating tablet Take 120 mg by mouth daily. .       No current facility-administered medications for this visit.         Social History:   Social History     Socioeconomic History    Marital status: Single     Spouse name: Not on file    Number of children: Not on file    Years of education: Not on file    Highest education level: Not on file   Occupational History    Not on file   Social Needs    Financial resource strain: Not on file    Food insecurity:     Worry: Not on file     Inability: Not on file    Transportation needs:     Medical: Not on file     Non-medical: Not on file   Tobacco Use    Smoking status: Current Every Day Smoker     Packs/day: 0.50     Years: 5.00     Pack years: 2.50     Types: Cigarettes    Smokeless tobacco: Never Used   Substance and Sexual Activity    Alcohol use: No     Alcohol/week: 0.0 oz    Drug use: No    Sexual activity: Yes     Partners: Male   Lifestyle    Physical activity:     Days per week: Not on file     Minutes per session: Not on file    Stress: Not on file   Relationships    Social connections:     Talks on phone: Not on file     Gets together: Not on file     Attends [employer] service: Not on file     Active member of club or organization: Not on file     Attends meetings of clubs or organizations: Not on file     Relationship status: Not on file    Intimate partner violence:     Fear of current or ex partner: Not on file     Emotionally abused: Not on file     Physically abused: Not on file     Forced sexual activity: Not on file   Other Topics Concern    Not on file   Social History Narrative    Not on file       TOBACCO:   reports that she has been smoking cigarettes. She has a 2.50 pack-year smoking history. She has never used smokeless tobacco.  ETOH:   reports that she does not drink alcohol. Family History:   Family History   Problem Relation Age of Onset    Cancer Maternal Grandmother     Liver Cancer Maternal Grandmother     Cancer Paternal Grandmother     Stroke Father     Colon Cancer Neg Hx     Colon Polyps Neg Hx     Esophageal Cancer Neg Hx     Stomach Cancer Neg Hx     Liver Disease Neg Hx     Rectal Cancer Neg Hx          A:  Pt presents as anxious in session, having difficulty sitting still. Discussed seizure activity. Recommended pt speak with her PCP about these incidents. She shares she has an appointment already scheduled with PCP. We discussed ways to address stress and reviewed techniques [facility] provided at last appointment. Pt is a bit difficult to engage. [facility]'s impression is once pt sat down she presents as though anxious to leave.        Diagnosis:    Generalized anxiety disorder      Diagnosis Date    Anxiety     Chronic pain     lower back and right hip    Depression     Since 2014    Endometriosis     GERD (gastroesophageal reflux disease)     Insomnia     Methadone maintenance therapy patient ([address]     hx of pain med addiction    Ovarian cyst     Overdose     Pneumonia     Was in hospital 1 week ago in [employer] problems and Other psychosocial and environmental problems      Plan:  Pt interventions:  Supportive techniques, Emphasized self-care as important for managing overall health and Emphasized importance of regular practice of relaxation strategies to target / promote anxiety reduction and improvements in overall health and wellness. Pt Behavioral Change Plan:  1. Return as scheduled.    2.  [facility] if you need to come in earlier or reschedule at [phone]

## 2019-04-25 NOTE — PATIENT INSTRUCTIONS
Patient Education        venlafaxine  Pronunciation:  CHENG la fax een  Brand:  Effexor XR  What is the most important information I should know about venlafaxine? Do not use venlafaxine within 7 days before or 14 days after you have used an MAO inhibitor, such as isocarboxazid, linezolid, methylene blue injection, phenelzine, rasagiline, selegiline, or tranylcypromine. Some young people have thoughts about suicide when first taking an antidepressant. Stay alert to changes in your mood or symptoms. Report any new or worsening symptoms to your doctor. Do not stop using venlafaxine without first talking to your doctor. What is venlafaxine? Venlafaxine is a selective serotonin and norepinephrine reuptake inhibitor (SNRIs) antidepressant. Venlafaxine affects chemicals in the brain that may be unbalanced in people with depression. Venlafaxine is used to treat major depressive disorder, anxiety, and panic disorder. Venlafaxine may also be used for purposes not listed in this medication guide. What should I discuss with my healthcare provider before taking venlafaxine? You should not take this medicine if you are allergic to venlafaxine or desvenlafaxine (Pristiq). Do not use venlafaxine within 7 days before or 14 days after you have used an MAO inhibitor, such as isocarboxazid, linezolid, methylene blue injection, phenelzine, rasagiline, selegiline, or tranylcypromine. A dangerous drug interaction could occur. Some medicines can interact with venlafaxine and cause a serious condition called serotonin syndrome. Be sure your doctor knows if you also take stimulant medicine, opioid medicine, herbal products, or medicine for depression, mental illness, Parkinson's disease, migraine headaches, serious infections, or prevention of nausea and vomiting. Ask your doctor before making any changes in how or when you take your medications.    Tell your doctor if you have ever had:  · bipolar disorder (manic may affect a drug-screening urine test and you may have false results. Tell the laboratory staff that you use venlafaxine. Store at room temperature away from moisture and heat. What happens if I miss a dose? Take the medicine as soon as you can, but skip the missed dose if it is almost time for your next dose. Do not take two doses at one time. What happens if I overdose? Seek emergency medical attention or call the Poison Help line at 1-804.831.7528. What should I avoid while taking venlafaxine? Drinking alcohol with this medicine can cause side effects. Ask your doctor before taking a nonsteroidal anti-inflammatory drug (NSAID) such as aspirin, ibuprofen (Advil, Motrin), naproxen (Aleve), celecoxib (Celebrex), diclofenac, indomethacin, meloxicam, and others. Using an NSAID with venlafaxine may cause you to bruise or bleed easily. Avoid driving or hazardous activity until you know how this medicine will affect you. Your reactions could be impaired. What are the possible side effects of venlafaxine? Get emergency medical help if you have signs of an allergic reaction: skin rash or hives; difficulty breathing; swelling of your face, lips, tongue, or throat. Report any new or worsening symptoms to your doctor, such as: mood or behavior changes, anxiety, panic attacks, trouble sleeping, or if you feel impulsive, irritable, agitated, hostile, aggressive, restless, hyperactive (mentally or physically), more depressed, or have thoughts about suicide or hurting yourself.   Call your doctor at once if you have:  · blurred vision, tunnel vision, eye pain or swelling, or seeing halos around lights;  · easy bruising or bleeding (nosebleeds, bleeding gums), blood in your urine or stools, coughing up blood;  · cough, chest tightness, trouble breathing;  · a seizure (convulsions);  · low sodium level  --headache, confusion, slurred speech, severe weakness, vomiting, loss of coordination, feeling unsteady; or  · severe nervous system reaction --very stiff (rigid) muscles, high fever, sweating, confusion, fast or uneven heartbeats, tremors, feeling like you might pass out. Seek medical attention right away if you have symptoms of serotonin syndrome, such as: agitation, hallucinations, fever, sweating, shivering, fast heart rate, muscle stiffness, twitching, loss of coordination, nausea, vomiting, or diarrhea  Common side effects may include:  · dizziness, drowsiness,  · anxiety, feeling nervous;  · sleep problems (insomnia);  · vision changes;  · nausea, vomiting, diarrhea;  · changes in weight or appetite;  · dry mouth, yawning;  · increased sweating; or  · decreased sex drive, impotence, abnormal ejaculation, difficulty having an orgasm. This is not a complete list of side effects and others may occur. Call your doctor for medical advice about side effects. You may report side effects to FDA at 4-767-FDA-2117. What other drugs will affect venlafaxine? Using venlafaxine with other drugs that make you drowsy can worsen this effect. Ask your doctor before using opioid medication, a sleeping pill, a muscle relaxer, or medicine for anxiety or seizures. Tell your doctor about all your current medicines. Many drugs can affect venlafaxine, especially:  · any other antidepressant;  · cimetidine;  · Veazie's wort;  · tramadol;  · tryptophan (sometimes called L-tryptophan);  · a blood thinner --warfarin, Coumadin, Jantoven;  · medicine to treat mood disorders, thought disorders, or mental illness --buspirone, lithium, and many others; or  · migraine headache medicine --sumatriptan, zolmitriptan, and others. This list is not complete and many other drugs may affect venlafaxine. This includes prescription and over-the-counter medicines, vitamins, and herbal products. Not all possible drug interactions are listed here. Where can I get more information? Your pharmacist can provide more information about venlafaxine.   Remember, keep this and all other medicines out of the reach of children, never share your medicines with others, and use this medication only for the indication prescribed. Every effort has been made to ensure that the information provided by [address] is accurate, up-to-date, and complete, but no guarantee is made to that effect. Drug information contained herein may be time sensitive. [facility] information has been compiled for use by healthcare practitioners and consumers in the [place] and therefore [facility] does not warrant that uses outside of the [place] are appropriate, unless specifically indicated otherwise. [facility]'s drug information does not endorse drugs, diagnose patients or recommend therapy. [facility]'s drug information is an informational resource designed to assist licensed healthcare practitioners in caring for their patients and/or to serve consumers viewing this service as a supplement to, and not a substitute for, the expertise, skill, knowledge and judgment of healthcare practitioners. The absence of a warning for a given drug or drug combination in no way should be construed to indicate that the drug or drug combination is safe, effective or appropriate for any given patient. [facility] does not assume any responsibility for any aspect of healthcare administered with the aid of information [facility] provides. The information contained herein is not intended to cover all possible uses, directions, precautions, warnings, drug interactions, allergic reactions, or adverse effects. If you have questions about the drugs you are taking, check with your doctor, nurse or pharmacist.  Copyright 9894-0555 [address]: 15.01. Revision date: 5/4/2018. Care instructions adapted under license by [facility] ([facility]).  If you have questions about a medical condition or this instruction, always ask your healthcare professional. [place] [address] any warranty or liability for your antidepressant your doctor prescribes for you. Tell your doctor if you or anyone in your family has had bipolar disorder or used antidepressants before. · Take your medicine as prescribed. It works best and has fewer side effects when you take it exactly as your doctor prescribed. If you miss a dose, and if it's not too late in the day, it's okay to take it. Don't double up doses. · Keep taking your medicine for a while. Taking it for at least 6 months after you feel better can help keep you from getting symptoms again. · Be prepared to try different medicines and doses. You may start to feel better within 1 to 3 weeks after you start the medicine. If you have not improved at all in 3 weeks, your doctor may increase your dose. Or you may need to try a different medicine. Over time, your doctor may need to adjust your dose again to manage your symptoms better. · Don't take any new medicines unless you talk to your doctor first.   Even common medicines like aspirin and some vitamins and herbs can cause problems if you use them while you take antidepressants. · Do not drink alcohol. It can make the side effects worse. · Don't stop taking your medicine on your own. Quitting antidepressants too quickly can cause withdrawal symptoms. It can also cause depression to return. If you are having a problem with your medicine or are ready to quit taking it, work with your doctor. He or she can help you to slowly reduce the dose over a span of a few weeks. · Call your doctor right away for serious side effects. Examples include:  ? Warning signs of suicide. These include talking or writing about death, giving away belongings, or withdrawing from family and friends. ? Manic behavior. This includes having very high energy, sleeping less than normal, being impulsive, or being grouchy or restless. How might you feel about taking antidepressants?   You may feel nervous, relieved, or a little surprised that your

## 2019-04-25 NOTE — PROGRESS NOTES
4.8 - 10.8 K/uL    RBC 4.68 4.20 - 5.40 M/uL    Hemoglobin 13.9 12.0 - 16.0 g/dL    Hematocrit 44.0 37.0 - 47.0 %    MCV 94.0 81.0 - 99.0 fL    MCH 29.7 27.0 - 31.0 pg    MCHC 31.6 (L) 33.0 - 37.0 g/dL    RDW 13.9 11.5 - 14.5 %    Platelets 913 222 - 636 K/uL    MPV 10.5 9.4 - 12.3 fL    Neutrophils % 62.3 50.0 - 65.0 %    Lymphocytes % 30.3 20.0 - 40.0 %    Monocytes % 5.1 0.0 - 10.0 %    Eosinophils % 1.6 0.0 - 5.0 %    Basophils % 0.5 0.0 - 1.0 %    Neutrophils # 6.5 1.5 - 7.5 K/uL    Lymphocytes # 3.2 1.1 - 4.5 K/uL    Monocytes # 0.50 0.00 - 0.90 K/uL    Eosinophils # 0.20 0.00 - 0.60 K/uL    Basophils # 0.10 0.00 - 0.20 K/uL   Comprehensive Metabolic Panel   Result Value Ref Range    Sodium 144 136 - 145 mmol/L    Potassium 3.6 3.5 - 5.0 mmol/L    Chloride 103 98 - 111 mmol/L    CO2 30 (H) 22 - 29 mmol/L    Anion Gap 11 7 - 19 mmol/L    Glucose 77 74 - 109 mg/dL    BUN 9 6 - 20 mg/dL    CREATININE 0.7 0.5 - 0.9 mg/dL    GFR Non-African American >60 >60    Calcium 9.2 8.6 - 10.0 mg/dL    Total Protein 7.4 6.6 - 8.7 g/dL    Alb 4.0 3.5 - 5.2 g/dL    Total Bilirubin <0.2 0.2 - 1.2 mg/dL    Alkaline Phosphatase 85 35 - 104 U/L    ALT 22 5 - 33 U/L    AST 41 (H) 5 - 32 U/L   Ethanol   Result Value Ref Range    Ethanol Lvl <10 mg/dL   Urinalysis Reflex to Culture   Result Value Ref Range    Color, UA DK YELLOW Straw/Yellow    Clarity, UA CLOUDY (A) Clear    Glucose, Ur Negative Negative mg/dL    Bilirubin Urine SMALL (A) Negative    Ketones, Urine TRACE (A) Negative mg/dL    Specific Gravity, UA 1.031 1.005 - 1.030    Blood, Urine Negative Negative    pH, UA 5.5 5.0 - 8.0    Protein, UA TRACE (A) Negative mg/dL    Urobilinogen, Urine 1.0 <2.0 E.U./dL    Nitrite, Urine Negative Negative    Leukocyte Esterase, Urine Negative Negative    Urine Reflex to Culture Not Indicated    Urine Drug Screen   Result Value Ref Range    Amphetamine Screen, Urine Negative Negative <1000 ng/mL    Barbiturate Screen, Ur Negative Negative < 200 ng/mL    Benzodiazepine Screen, Urine Negative Negative <100 ng/mL    Cannabinoid Scrn, Ur Negative Negative <50 ng/mL    Cocaine Metabolite Screen, Urine Negative Negative <300 ng/mL    Opiate Scrn, Ur Negative Negative < 300 ng/mL    Drug Screen Comment: see below    Vitamin D 25 Hydroxy   Result Value Ref Range    Vit D, 25-Hydroxy 22.3 (L) >=30 ng/mL   Vitamin B12   Result Value Ref Range    Vitamin B-12 472 211 - 946 pg/mL   TSH without Reflex   Result Value Ref Range    TSH 6.270 (H) 0.270 - 4.200 uIU/mL       I have reviewed the following with the Ms. Megan Brown   Lab Review   Admission on 01/23/2019, Discharged on 01/25/2019   Component Date Value    WBC 01/23/2019 10.5     RBC 01/23/2019 4.68     Hemoglobin 01/23/2019 13.9     Hematocrit 01/23/2019 44.0     MCV 01/23/2019 94.0     MCH 01/23/2019 29.7     MCHC 01/23/2019 31.6*    RDW 01/23/2019 13.9     Platelets 30/83/4458 244     MPV 01/23/2019 10.5     Neutrophils % 01/23/2019 62.3     Lymphocytes % 01/23/2019 30.3     Monocytes % 01/23/2019 5.1     Eosinophils % 01/23/2019 1.6     Basophils % 01/23/2019 0.5     Neutrophils # 01/23/2019 6.5     Lymphocytes # 01/23/2019 3.2     Monocytes # 01/23/2019 0.50     Eosinophils # 01/23/2019 0.20     Basophils # 01/23/2019 0.10     Sodium 01/23/2019 144     Potassium 01/23/2019 3.6     Chloride 01/23/2019 103     CO2 01/23/2019 30*    Anion Gap 01/23/2019 11     Glucose 01/23/2019 77     BUN 01/23/2019 9     CREATININE 01/23/2019 0.7     GFR Non- 01/23/2019 >60     Calcium 01/23/2019 9.2     Total Protein 01/23/2019 7.4     Alb 01/23/2019 4.0     Total Bilirubin 01/23/2019 <0.2     Alkaline Phosphatase 01/23/2019 85     ALT 01/23/2019 22     AST 01/23/2019 41*    Ethanol Lvl 01/23/2019 <10     Color, UA 01/23/2019 DK YELLOW     Clarity, UA 01/23/2019 CLOUDY*    Glucose, Ur 01/23/2019 Negative     Bilirubin Urine 01/23/2019 SMALL*    Ketones, Urine 01/23/2019 TRACE*    Specific [place], UA 01/23/2019 1.031     Blood, Urine 01/23/2019 Negative     pH, UA 01/23/2019 5.5     Protein, UA 01/23/2019 TRACE*    Urobilinogen, Urine 01/23/2019 1.0     Nitrite, Urine 01/23/2019 Negative     Leukocyte Esterase, Urine 01/23/2019 Negative     Urine Reflex to Culture 01/23/2019 Not Indicated     Amphetamine Screen, Urine 01/23/2019 Negative     Barbiturate Screen, Ur 01/23/2019 Negative     Benzodiazepine Screen, U* 01/23/2019 Negative     Cannabinoid Scrn, Ur 01/23/2019 Negative     Cocaine Metabolite Scree* 01/23/2019 Negative     Opiate Scrn, Ur 01/23/2019 Negative     Drug Screen Comment: 01/23/2019 see below     Vit D, 25-Hydroxy 01/25/2019 22.3*    Vitamin B-12 01/25/2019 472     TSH 01/25/2019 6.270*     Copies of these are in the chart. Prior to Visit Medications    Medication Sig Taking?  Authorizing Provider   venlafaxine (EFFEXOR XR) 150 MG extended release capsule Take 1 capsule by mouth daily Yes RONALD Sanchez   venlafaxine (EFFEXOR XR) 37.5 MG extended release capsule Take 1 capsule by mouth daily Add to the 150mg daily Yes RONALD Sanchez   ondansetron (ZOFRAN ODT) 8 MG TBDP disintegrating tablet Place 1 tablet under the tongue every 8 hours as needed for Nausea or Vomiting Yes RONALD Sanchez   atorvastatin (LIPITOR) 20 MG tablet Take 1 tablet by mouth nightly Yes RONALD Sanchez   Nebulizers Lilly Thanh COMPRESS PED NEBULIZER) MISC 1 applicator by Does not apply route every 4 hours as needed (soa) Yes RONALD Sanchez   albuterol (ACCUNEB) 1.25 MG/3ML nebulizer solution Inhale 3 mLs into the lungs every 6 hours as needed for Wheezing Yes RONALD Sanchez   QUEtiapine (SEROQUEL) 50 MG tablet Take 2 tablets by mouth every evening Yes RONALD Sanchez   pantoprazole (PROTONIX) 40 MG tablet Take 1 tablet by mouth daily Yes RONALD Sanchez   furosemide (LASIX) 40 MG tablet Take Cardiovascular: Negative for chest pain, palpitations and leg swelling. Gastrointestinal: Negative for abdominal pain, constipation, diarrhea, nausea and vomiting. Genitourinary: Negative for difficulty urinating. Musculoskeletal: Positive for arthralgias and back pain. Skin: Negative for rash. Psychiatric/Behavioral: Negative for agitation, behavioral problems, self-injury, sleep disturbance and suicidal ideas. The patient is nervous/anxious (improving at present). Physical Exam   Constitutional: She is oriented to person, place, and time. She appears well-developed and well-nourished. No distress. HENT:   Head: Normocephalic. Right Ear: External ear normal.   Left Ear: External ear normal.   Old trach scar   Eyes: Right eye exhibits no discharge. Left eye exhibits no discharge. Neck: Normal range of motion. Cardiovascular: Normal rate, regular rhythm, normal heart sounds and intact distal pulses. No murmur heard. Pulmonary/Chest: Effort normal and breath sounds normal. No respiratory distress. Abdominal: Soft. Musculoskeletal: She exhibits tenderness (lower back chronic). Lymphadenopathy:     She has no cervical adenopathy. Neurological: She is alert and oriented to person, place, and time. No cranial nerve deficit. Skin: Skin is warm. Capillary refill takes less than 2 seconds. No rash noted. She is not diaphoretic. Psychiatric: She has a normal mood and affect. Her behavior is normal.   Stable at present         ASSESSMENT/ PLAN    1. Situational anxiety  Will increase  Cont with nitza as instructed  She is doing better with this  - venlafaxine (EFFEXOR XR) 150 MG extended release capsule; Take 1 capsule by mouth daily  Dispense: 30 capsule; Refill: 11  - venlafaxine (EFFEXOR XR) 37.5 MG extended release capsule; Take 1 capsule by mouth daily Add to the 150mg daily  Dispense: 30 capsule; Refill: 11    2.  Recurrent major depressive disorder, in partial remission ([facility].)  Well at present  - venlafaxine (EFFEXOR XR) 150 MG extended release capsule; Take 1 capsule by mouth daily  Dispense: 30 capsule; Refill: 11  - venlafaxine (EFFEXOR XR) 37.5 MG extended release capsule; Take 1 capsule by mouth daily Add to the 150mg daily  Dispense: 30 capsule; Refill: 11      No orders of the defined types were placed in this encounter. Return in about 6 weeks (around 6/6/2019) for yearly check up and labs. Patient Instructions     Patient Education        venlafaxine  Pronunciation:  CHENG la fax een  Brand:  Effexor XR  What is the most important information I should know about venlafaxine? Do not use venlafaxine within 7 days before or 14 days after you have used an MAO inhibitor, such as isocarboxazid, linezolid, methylene blue injection, phenelzine, rasagiline, selegiline, or tranylcypromine. Some young people have thoughts about suicide when first taking an antidepressant. Stay alert to changes in your mood or symptoms. Report any new or worsening symptoms to your doctor. Do not stop using venlafaxine without first talking to your doctor. What is venlafaxine? Venlafaxine is a selective serotonin and norepinephrine reuptake inhibitor (SNRIs) antidepressant. Venlafaxine affects chemicals in the brain that may be unbalanced in people with depression. Venlafaxine is used to treat major depressive disorder, anxiety, and panic disorder. Venlafaxine may also be used for purposes not listed in this medication guide. What should I discuss with my healthcare provider before taking venlafaxine? You should not take this medicine if you are allergic to venlafaxine or desvenlafaxine (Pristiq). Do not use venlafaxine within 7 days before or 14 days after you have used an MAO inhibitor, such as isocarboxazid, linezolid, methylene blue injection, phenelzine, rasagiline, selegiline, or tranylcypromine. A dangerous drug interaction could occur.   Some medicines can interact with venlafaxine and cause a serious condition called serotonin syndrome. Be sure your doctor knows if you also take stimulant medicine, opioid medicine, herbal products, or medicine for depression, mental illness, Parkinson's disease, migraine headaches, serious infections, or prevention of nausea and vomiting. Ask your doctor before making any changes in how or when you take your medications. Tell your doctor if you have ever had:  · bipolar disorder (manic depression);  · cirrhosis or other liver disease;  · kidney disease;  · heart disease, high blood pressure, high cholesterol;  · diabetes;  · narrow-angle glaucoma;  · a thyroid disorder;  · a history of seizures;  · a bleeding or blood clotting disorder;  · low levels of sodium in your blood; or  · if you are switching to venlafaxine from another antidepressant. Some young people have thoughts about suicide when first taking an antidepressant. Your doctor should check your progress at regular visits. Your family or other caregivers should also be alert to changes in your mood or symptoms. Venlafaxine may cause serious lung problems in a  if the mother takes the medicine late in pregnancy (during the third trimester). However, you may have a relapse of depression if you stop taking your antidepressant. Tell your doctor right away if you become pregnant. Do not start or stop taking venlafaxine during pregnancy without your doctor's advice. You should not breast-feed while using this medicine. Venlafaxine is not approved for use by anyone younger than 25years old. How should I take venlafaxine? Follow all directions on your prescription label and read all medication guides or instruction sheets. Use the medicine exactly as directed. Venlafaxine should be taken with food. Try to take venlafaxine at the same time each day. Swallow the extended-release capsule or tablet whole and do not crush, chew, break, or open it.   If you cannot swallow a capsule whole, open it and sprinkle the medicine into a spoonful of applesauce. Swallow the mixture right away without chewing. Do not save it for later use. It may take several weeks before your symptoms improve. Keep using the medication as directed. Do not stop using venlafaxine without first talking to your doctor. You may have unpleasant side effects if you stop taking this medicine suddenly. Your blood pressure will need to be checked often. This medicine may affect a drug-screening urine test and you may have false results. Tell the laboratory staff that you use venlafaxine. Store at room temperature away from moisture and heat. What happens if I miss a dose? Take the medicine as soon as you can, but skip the missed dose if it is almost time for your next dose. Do not take two doses at one time. What happens if I overdose? Seek emergency medical attention or call the Poison Help line at 1-395.719.2071. What should I avoid while taking venlafaxine? Drinking alcohol with this medicine can cause side effects. Ask your doctor before taking a nonsteroidal anti-inflammatory drug (NSAID) such as aspirin, ibuprofen (Advil, Motrin), naproxen (Aleve), celecoxib (Celebrex), diclofenac, indomethacin, meloxicam, and others. Using an NSAID with venlafaxine may cause you to bruise or bleed easily. Avoid driving or hazardous activity until you know how this medicine will affect you. Your reactions could be impaired. What are the possible side effects of venlafaxine? Get emergency medical help if you have signs of an allergic reaction: skin rash or hives; difficulty breathing; swelling of your face, lips, tongue, or throat.   Report any new or worsening symptoms to your doctor, such as: mood or behavior changes, anxiety, panic attacks, trouble sleeping, or if you feel impulsive, irritable, agitated, hostile, aggressive, restless, hyperactive (mentally or physically), more depressed, or have thoughts about suicide or interactions, allergic reactions, or adverse effects. If you have questions about the drugs you are taking, check with your doctor, nurse or pharmacist.  Copyright 5511-5330 [employer] [address] [place] [address] Avenue: 15.01. Revision date: 5/4/2018. Care instructions adapted under license by [facility] ([facility]). If you have questions about a medical condition or this instruction, always ask your healthcare professional. [place] [address] any warranty or liability for your use of this information. Patient Education        Learning about Antidepressants  What are antidepressants? Antidepressants are medicines that change the levels of some chemicals in the brain. They can help affect moods. There are different types that work on brain chemicals in different ways. These medicines can help treat depression. They are also used for anxiety, eating disorders, post-traumatic stress disorder, and chronic pain. What are some examples? Here are some examples of antidepressants. For each item in the list, the generic name is first, followed by any brand names. · amitriptyline  · bupropion (Wellbutrin)  · citalopram (Celexa)  · fluoxetine (Prozac)  · sertraline (Zoloft)  · venlafaxine (Effexor)  This is not a complete list of antidepressants. What are the side effects? Side effects may vary. They depend on the medicine you take. But common ones include:  · Nausea. · Dry mouth. · Loss of appetite. · Diarrhea or constipation. · Sexual problems. (These may include loss of desire or erection problems.)  · Headaches. · Trouble falling asleep. Or you may wake up a lot at night. · Weight gain. · Feeling nervous or on edge. · Feeling drowsy in the daytime. Problems with sexual arousal and a lack of interest in sex are common side effects. If this happens to you, talk to your doctor. There are other medicines that may help with these problems. Mild side effects may go away after you take the medicine for a few weeks. If the side effects bother you, talk with your doctor. He or she may prescribe a different medicine. Or the doctor may suggest ways to manage your side effects. How do you take antidepressants safely? If your doctor has prescribed antidepressants, here are some tips to help you get the most from your medicine. · Share your health history with your doctor. Tell your doctor about your other medicines and health conditions. This information can affect which antidepressant your doctor prescribes for you. Tell your doctor if you or anyone in your family has had bipolar disorder or used antidepressants before. · Take your medicine as prescribed. It works best and has fewer side effects when you take it exactly as your doctor prescribed. If you miss a dose, and if it's not too late in the day, it's okay to take it. Don't double up doses. · Keep taking your medicine for a while. Taking it for at least 6 months after you feel better can help keep you from getting symptoms again. · Be prepared to try different medicines and doses. You may start to feel better within 1 to 3 weeks after you start the medicine. If you have not improved at all in 3 weeks, your doctor may increase your dose. Or you may need to try a different medicine. Over time, your doctor may need to adjust your dose again to manage your symptoms better. · Don't take any new medicines unless you talk to your doctor first.   Even common medicines like aspirin and some vitamins and herbs can cause problems if you use them while you take antidepressants. · Do not drink alcohol. It can make the side effects worse. · Don't stop taking your medicine on your own. Quitting antidepressants too quickly can cause withdrawal symptoms. It can also cause depression to return. If you are having a problem with your medicine or are ready to quit taking it, work with your doctor.  He or she can help you to slowly reduce the dose over a span of a few weeks.  · Call your doctor right away for serious side effects. Examples include:  ? Warning signs of suicide. These include talking or writing about death, giving away belongings, or withdrawing from family and friends. ? Manic behavior. This includes having very high energy, sleeping less than normal, being impulsive, or being grouchy or restless. How might you feel about taking antidepressants? You may feel nervous, relieved, or a little surprised that your doctor is talking to you about antidepressants. And the thought of needing to take medicine for a long time can be scary. But whether you are depressed or you have another condition, you are taking a step to help yourself feel better. Follow-up care is a key part of your treatment and safety. Be sure to make and go to all appointments, and call your doctor if you are having problems. It's also a good idea to know your test results and keep a list of the medicines you take. Where can you learn more? Go to https://[employer].dermSearch. org and sign in to your 1[employer] account. Enter A230 in the [employer] box to learn more about \"Learning about Antidepressants. \"     If you do not have an account, please click on the \"Sign Up Now\" link. Current as of: September 11, 2018  Content Version: 11.9  © 7439-9650 [employer]. Care instructions adapted under license by [facility] ([facility]). If you have questions about a medical condition or this instruction, always ask your healthcare professional. [place] [address] any warranty or liability for your use of this information. Controlled Substances Monitoring:                   Additional Instructions: As always, patient is advisedto bring in medication bottles in order to correctly reconcile with our current list.      Mabel received counseling on the following healthy behaviors: none    Patient giveneducational materials on plan of care    I have instructed Mabel to complete a self tracking handout on none and instructed them to bring it with them to her next appointment. Discussed use, benefit, and side effects of prescribed medications. Barriers to medication compliance addressed. All patient questions answered. Pt voiced understanding.      RONALD Fang

## 2019-05-02 NOTE — PATIENT INSTRUCTIONS
[address],  [place],  [place]         Phone:  [phone]           To whom it may concern,     Lorenzo Shanks is a patient at this clinic and has been referred to me, [facility], at [facility]. She attended and participated in an appointment today 05/02/2019, and has previously met with [facility] on 3/11/2019. At this time today, Delilah Lamb is not at risk of harm to herself or others. She is being followed by her provider and has access to [facility] on   A bi-weekly basis or as needed.          Dylan Bragg, DIONI, [address] [place] [address], [place], [place].  [phone]  [address] [place]

## 2019-05-07 NOTE — PROGRESS NOTES
Behavioral Health Consultation  Dylan Bragg LCSW   05/02/2019  15:30      Time spent with Patient: 60 minutes  This is patient's third  [facility] appointment. Reason for Consult:    Chief Complaint   Patient presents with    Anxiety    Stress    Other     Referring Provider: No referring provider defined for this encounter. Feedback given to PCP. S:  Pt presents today with bright affect, looks like she is feeling better physically. Pt shares that she had pneumonia, and received treatment for this. Appears to have more energy. Discussed occurrences since last session. Described frustrations with having physical limitations due to medical issues.    Pt continues to share with [facility] she doesn't fully understand what is going on with her medically but does have an awareness of a nodule in her lung that providers are keeping an eye on.      O:  MSE:    Appearance    alert, cooperative, smiling  Appetite normal  Sleep disturbance Yes  Fatigue Yes  Loss of pleasure No  Impulsive behavior No  Speech    spontaneous, normal rate and normal volume  Mood    Euthymic in presentation   Affect    normal affect  Thought Content    intact  Thought Process    coherent  Associations    logical connections  Insight    Fair  Judgment    Impaired  Orientation    oriented to person, place, time, and general circumstances  Memory    recent and remote memory intact  Attention/Concentration    intact  Morbid ideation No  Suicide Assessment    no suicidal ideation      History:    Medications:   Current Outpatient Medications   Medication Sig Dispense Refill    QUEtiapine (SEROQUEL) 50 MG tablet TAKE TWO TABLETS BY MOUTH EVERY EVENING 60 tablet 3    traZODone (DESYREL) 100 MG tablet TAKE ONE TABLET BY MOUTH NIGHTLY - MAY TAKE  ADDITIONAL TABLET IN 30-40 MINUTES IF STILL AWAKE 60 tablet 5    venlafaxine (EFFEXOR XR) 150 MG extended release capsule Take 1 capsule by mouth daily 30 capsule 11    venlafaxine (EFFEXOR Partners: Male   Lifestyle    Physical activity:     Days per week: Not on file     Minutes per session: Not on file    Stress: Not on file   Relationships    Social connections:     Talks on phone: Not on file     Gets together: Not on file     Attends Restorationist service: Not on file     Active member of club or organization: Not on file     Attends meetings of clubs or organizations: Not on file     Relationship status: Not on file    Intimate partner violence:     Fear of current or ex partner: Not on file     Emotionally abused: Not on file     Physically abused: Not on file     Forced sexual activity: Not on file   Other Topics Concern    Not on file   Social History Narrative    Not on file       TOBACCO:   reports that she has been smoking cigarettes. She has a 2.50 pack-year smoking history. She has never used smokeless tobacco.  ETOH:   reports that she does not drink alcohol. Family History:   Family History   Problem Relation Age of Onset    Cancer Maternal Grandmother     Liver Cancer Maternal Grandmother     Cancer Paternal Grandmother     Stroke Father     Colon Cancer Neg Hx     Colon Polyps Neg Hx     Esophageal Cancer Neg Hx     Stomach Cancer Neg Hx     Liver Disease Neg Hx     Rectal Cancer Neg Hx          A:  Pt appears to look healthier today than in the previous session. She has bright affect and is talking and engages well in session. We discussed quality of life and ways to work toward improving her qualify of life with the limitations she believes she has. PT present as at no risk of harm to herself or others. Appears to have a good attitude about her life situation.         Diagnosis:    Hx of Generalized anxiety disorder      Diagnosis Date    Anxiety     Chronic pain     lower back and right hip    Depression     Since 2014    Endometriosis     GERD (gastroesophageal reflux disease)     Insomnia     [facility]     hx of pain med addiction    Ovarian cyst     Overdose     Pneumonia     Was in hospital 1 week ago in [place]     Occupational problems, Housing problems, Economic problems and Other psychosocial and environmental problems      Plan:  Pt interventions:  Trained in strategies for increasing balanced thinking, Discussed and set plan for behavioral activation, Provided education and Discussed self-care (sleep, nutrition, rewarding activities, social support, exercise)      Pt Behavioral Change Plan:  Referral for Specialty outpatient counseling

## 2019-08-04 NOTE — ED PROVIDER NOTES
Starting Thu 4/4/2019, Disp-1 each, R-0, Print      albuterol (ACCUNEB) 1.25 MG/3ML nebulizer solution Inhale 3 mLs into the lungs every 6 hours as needed for Wheezing, Disp-360 mL, R-3Print      gabapentin (NEURONTIN) 800 MG tablet Take 1 tablet by mouth 4 times daily for 30 days. ., Disp-120 tablet, R-5Normal      pantoprazole (PROTONIX) 40 MG tablet Take 1 tablet by mouth daily, Disp-30 tablet, R-11This prescription was filled on 1/18/2019. Any refills authorized will be placed on file. Normal      furosemide (LASIX) 40 MG tablet Take 1 tablet by mouth daily As needed for swelling, Disp-90 tablet, R-3This prescription was filled on 11/23/2018. Any refills authorized will be placed on file. Normal      metoprolol succinate (TOPROL XL) 50 MG extended release tablet Take 1 tablet by mouth daily TAKE ONE TABLET BY MOUTH DAILY, Disp-30 tablet, R-11This prescription was filled on 10/5/2018. Any refills authorized will be placed on file. Normal      methadone (METHADOSE) 40 MG disintegrating tablet Take 120 mg by mouth daily. Kami Breaux Historical Med       !! - Potential duplicate medications found. Please discuss with provider.           ALLERGIES     Codeine    FAMILY HISTORY       Family History   Problem Relation Age of Onset    Cancer Maternal Grandmother     Liver Cancer Maternal Grandmother     Cancer Paternal Grandmother     Stroke Father     Colon Cancer Neg Hx     Colon Polyps Neg Hx     Esophageal Cancer Neg Hx     Stomach Cancer Neg Hx     Liver Disease Neg Hx     Rectal Cancer Neg Hx           SOCIAL HISTORY       Social History     Socioeconomic History    Marital status: Single     Spouse name: None    Number of children: None    Years of education: None    Highest education level: None   Occupational History    None   Social Needs    Financial resource strain: None    Food insecurity:     Worry: None     Inability: None    Transportation needs:     Medical: None     Non-medical: None   Tobacco Use  Smoking status: Current Every Day Smoker     Packs/day: 0.50     Years: 5.00     Pack years: 2.50     Types: Cigarettes    Smokeless tobacco: Never Used   Substance and Sexual Activity    Alcohol use: No     Alcohol/week: 0.0 standard drinks    Drug use: Not Currently    Sexual activity: Yes     Partners: Male   Lifestyle    Physical activity:     Days per week: None     Minutes per session: None    Stress: None   Relationships    Social connections:     Talks on phone: None     Gets together: None     Attends Gnosticist service: None     Active member of club or organization: None     Attends meetings of clubs or organizations: None     Relationship status: None    Intimate partner violence:     Fear of current or ex partner: None     Emotionally abused: None     Physically abused: None     Forced sexual activity: None   Other Topics Concern    None   Social History Narrative    None       SCREENINGS      @FLOW([id number])@      PHYSICAL EXAM    (up to 7 for level 4, 8 or more for level 5)     ED Triage Vitals   BP Temp Temp Source Pulse Resp SpO2 Height Weight   08/04/19 1749 08/04/19 1748 08/04/19 1748 08/04/19 1748 08/04/19 1748 08/04/19 1748 08/04/19 1748 08/04/19 1748   124/79 98 °F (36.7 °C) Temporal 99 20 96 % 5' 1\" (1.549 m) 180 lb (81.6 kg)       Physical Exam   Constitutional: She is oriented to person, place, and time. She appears well-developed and well-nourished. No distress. HENT:   Mouth/Throat: Oropharynx is clear and moist.   Eyes: Conjunctivae are normal.   Neck: Normal range of motion. Neck supple. Cardiovascular: Normal rate, regular rhythm and normal heart sounds. Pulmonary/Chest: Effort normal and breath sounds normal.   Musculoskeletal: She exhibits no edema. Neurological: She is alert and oriented to person, place, and time. No cranial nerve deficit. Skin: Skin is warm and dry. She is not diaphoretic. Psychiatric: She has a normal mood and affect.    Nursing note and vitals reviewed. DIAGNOSTIC RESULTS     EKG: All EKG's are interpreted by the Emergency Department Physician who either signs or Co-signsthis chart in the absence of a cardiologist.        RADIOLOGY:   Gardash Muñozune such as CT, Ultrasound and MRI are read by the radiologist. Plain radiographic images are visualized and preliminarily interpreted by the emergency physician with the below findings:        Interpretation per the Radiologist below, if available at the time ofthis note:    XR CHEST STANDARD (2 VW)   Final Result   1. Incidental note is made of linear atelectasis left lung unchanged   from March 11, 2019. No acute cardiopulmonary process is identified. Signed by Dr Venessa Barajas on 8/4/2019 7:00 PM      XR ABDOMEN (KUB) (SINGLE AP VIEW)   Final Result   1. No radiographic evidence of acute abdominopelvic process. Signed by Dr Venessa Barajas on 8/4/2019 6:32 PM            ED BEDSIDE ULTRASOUND:   Performed by ED Physician - none    LABS:  Labs Reviewed   CBC WITH AUTO DIFFERENTIAL - Abnormal; Notable for the following components:       Result Value    MCHC 32.2 (*)     All other components within normal limits   COMPREHENSIVE METABOLIC PANEL W/ REFLEX TO MG FOR LOW K - Abnormal; Notable for the following components:    CREATININE 1.1 (*)     GFR Non- 55 (*)     All other components within normal limits       All other labs were within normal range or not returned as of this dictation. EMERGENCY DEPARTMENT COURSE and DIFFERENTIAL DIAGNOSIS/MDM:   Vitals:    Vitals:    08/04/19 1748 08/04/19 1749   BP:  124/79   Pulse: 99    Resp: 20    Temp: 98 °F (36.7 °C)    TempSrc: Temporal    SpO2: 96%    Weight: 180 lb (81.6 kg)    Height: 5' 1\" (1.549 m)            MDM    CRITICAL CARE TIME   Total Critical Care time was 0 minutes, excluding separately reportable procedures.   There was a high probability of clinically significant/lifethreatening deterioration in the patient's

## 2019-09-17 NOTE — TELEPHONE ENCOUNTER
Received fax from pharmacy requesting refill on pts medication(s). Pt was last seen in office on 4/25/2019  and has a follow up scheduled for Visit date not found. Will send request to  Adi English  for patient.      Requested Prescriptions     Pending Prescriptions Disp Refills    ondansetron (ZOFRAN ODT) 4 MG disintegrating tablet 12 tablet 0     Sig: Take 1 tablet by mouth every 8 hours as needed for Nausea or Vomiting

## 2019-09-20 NOTE — PROGRESS NOTES
[address]  [place], [address] [place], [place] [address]  Phone [phone]   Fax [phone]      OFFICE VISIT: 2019    Anne Jesse- : 1980      Reason For Visit:  Yogi Hurd is a 35 y.o. femalewho is here for Medication Refill (Patient says she needs refills on all meds)         Health Maintenance   HPI      Patient is here for follow up for neurontin  She is on the neurntin four times a day  And methadone clinic  And doing well  She can now take it back home    Hasn't been admitted since spring  She is a methadone clinic patient  That requires neurontin also for chronic pain  Her clinic is aware of the use   And it is very helpful for her     She was on discharged set [facility]  She feels like she had so much \"stress\"  She had a panic attack  And they let her gone home  They adjusted her medication  She is taking effexor xr 150mg  Added 37.5 for total 225 mg at last visit with help  seroquel at bedtime   Sleeping well with this      Traveling with her father and doing well          She reports she is feeling better  The stress is the issues generally    Not doing any worse  She is feeling better         height is 5' 2\" (1.575 m) and weight is 210 lb 4 oz (95.4 kg). Her temporal temperature is 97.9 °F (36.6 °C). Her blood pressure is 138/88 and her pulse is 88. Her oxygen saturation is 98%. Body mass index is 38.46 kg/m².     Results for orders placed or performed during the hospital encounter of 19   CBC Auto Differential   Result Value Ref Range    WBC 10.7 4.8 - 10.8 K/uL    RBC 4.68 4.20 - 5.40 M/uL    Hemoglobin 14.5 12.0 - 16.0 g/dL    Hematocrit 45.0 37.0 - 47.0 %    MCV 96.2 81.0 - 99.0 fL    MCH 31.0 27.0 - 31.0 pg    MCHC 32.2 (L) 33.0 - 37.0 g/dL    RDW 13.7 11.5 - 14.5 %    Platelets 898 728 - 444 K/uL    MPV 10.1 9.4 - 12.3 fL    Neutrophils % 62.7 50.0 - 65.0 %    Lymphocytes % 27.9 20.0 - 40.0 %    Monocytes % 6.4 0.0 - 10.0 %    Eosinophils % 2.0 0.0 - 5.0 %    Basophils professional. [address] any warranty or liability for your use of this information. Controlled Substances Monitoring:     Attestation: The Prescription Monitoring Report for this patient was reviewed today. ([id number]) RONALD Snell)  Periodic Controlled Substance Monitoring: Possible medication side effects, risk of tolerance/dependence & alternative treatments discussed., Obtaining appropriate analgesic effect of treatment., No signs of potential drug abuse or diversion identified. RONALD Snell)            Additional Instructions: As always, patient is Tanmay Clink bring in medication bottles in order to correctly reconcile with our current list.      Mabel received counseling on the following healthy behaviors: none    Patient giveneducational materials on plan of care    I have instructed Mabel to complete a self tracking handout on none and instructed them to bring it with them to her next appointment. Discussed use, benefit, and side effects of prescribed medications. Barriers to medication compliance addressed. All patient questions answered. Pt voiced understanding.      RONALD Snell

## 2019-11-24 PROBLEM — S82.891A CLOSED RIGHT ANKLE FRACTURE, INITIAL ENCOUNTER: Status: ACTIVE | Noted: 2019-01-01

## 2019-12-17 PROBLEM — J18.9 PNEUMONIA: Status: ACTIVE | Noted: 2019-01-01

## 2019-12-24 PROBLEM — F33.0 MAJOR DEPRESSIVE DISORDER, RECURRENT EPISODE, MILD (HCC): Status: ACTIVE | Noted: 2019-01-01

## 2020-01-01 ENCOUNTER — APPOINTMENT (OUTPATIENT)
Dept: MRI IMAGING | Facility: HOSPITAL | Age: 40
End: 2020-01-01

## 2020-01-01 ENCOUNTER — APPOINTMENT (OUTPATIENT)
Dept: GENERAL RADIOLOGY | Facility: HOSPITAL | Age: 40
End: 2020-01-01

## 2020-01-01 ENCOUNTER — APPOINTMENT (OUTPATIENT)
Dept: CARDIOLOGY | Facility: HOSPITAL | Age: 40
End: 2020-01-01

## 2020-01-01 ENCOUNTER — APPOINTMENT (OUTPATIENT)
Dept: CT IMAGING | Facility: HOSPITAL | Age: 40
End: 2020-01-01

## 2020-01-01 ENCOUNTER — HOSPITAL ENCOUNTER (INPATIENT)
Facility: HOSPITAL | Age: 40
LOS: 1 days | End: 2020-01-11
Attending: INTERNAL MEDICINE | Admitting: INTERNAL MEDICINE

## 2020-01-01 ENCOUNTER — APPOINTMENT (OUTPATIENT)
Dept: NEUROLOGY | Facility: HOSPITAL | Age: 40
End: 2020-01-01

## 2020-01-01 VITALS
SYSTOLIC BLOOD PRESSURE: 101 MMHG | WEIGHT: 220.5 LBS | DIASTOLIC BLOOD PRESSURE: 76 MMHG | TEMPERATURE: 100.5 F | HEIGHT: 61 IN | BODY MASS INDEX: 41.63 KG/M2 | OXYGEN SATURATION: 96 %

## 2020-01-01 LAB
ALBUMIN SERPL-MCNC: 3.7 G/DL (ref 3.5–5.2)
ALBUMIN/GLOB SERPL: 1.3 G/DL
ALP SERPL-CCNC: 90 U/L (ref 39–117)
ALT SERPL W P-5'-P-CCNC: 43 U/L (ref 1–33)
AMPHET+METHAMPHET UR QL: NEGATIVE
AMPHETAMINES UR QL: NEGATIVE
ANION GAP SERPL CALCULATED.3IONS-SCNC: 19 MMOL/L (ref 5–15)
ANION GAP SERPL CALCULATED.3IONS-SCNC: 20 MMOL/L (ref 5–15)
APTT PPP: 26.4 SECONDS (ref 24.1–35)
ARTERIAL PATENCY WRIST A: POSITIVE
AST SERPL-CCNC: 76 U/L (ref 1–32)
ATMOSPHERIC PRESS: 752 MMHG
BACTERIA UR QL AUTO: ABNORMAL /HPF
BARBITURATES UR QL SCN: NEGATIVE
BASE EXCESS BLDA CALC-SCNC: -4.5 MMOL/L (ref 0–2)
BASOPHILS # BLD AUTO: 0.02 10*3/MM3 (ref 0–0.2)
BASOPHILS NFR BLD AUTO: 0.4 % (ref 0–1.5)
BDY SITE: ABNORMAL
BENZODIAZ UR QL SCN: NEGATIVE
BH CV ECHO MEAS - AO MAX PG (FULL): 6 MMHG
BH CV ECHO MEAS - AO MAX PG: 8.8 MMHG
BH CV ECHO MEAS - AO MEAN PG (FULL): 3 MMHG
BH CV ECHO MEAS - AO MEAN PG: 5 MMHG
BH CV ECHO MEAS - AO ROOT AREA (BSA CORRECTED): 1.3
BH CV ECHO MEAS - AO ROOT AREA: 5.5 CM^2
BH CV ECHO MEAS - AO ROOT DIAM: 2.7 CM
BH CV ECHO MEAS - AO V2 MAX: 148 CM/SEC
BH CV ECHO MEAS - AO V2 MEAN: 102 CM/SEC
BH CV ECHO MEAS - AO V2 VTI: 27.3 CM
BH CV ECHO MEAS - AVA(I,A): 2.2 CM^2
BH CV ECHO MEAS - AVA(I,D): 2.2 CM^2
BH CV ECHO MEAS - AVA(V,A): 1.8 CM^2
BH CV ECHO MEAS - AVA(V,D): 1.8 CM^2
BH CV ECHO MEAS - BSA(HAYCOCK): 2.1 M^2
BH CV ECHO MEAS - BSA: 2 M^2
BH CV ECHO MEAS - BZI_BMI: 41.6 KILOGRAMS/M^2
BH CV ECHO MEAS - BZI_METRIC_HEIGHT: 154.9 CM
BH CV ECHO MEAS - BZI_METRIC_WEIGHT: 99.8 KG
BH CV ECHO MEAS - EDV(CUBED): 46.7 ML
BH CV ECHO MEAS - EDV(MOD-SP4): 91.2 ML
BH CV ECHO MEAS - EDV(TEICH): 54.4 ML
BH CV ECHO MEAS - EF(CUBED): 59.2 %
BH CV ECHO MEAS - EF(MOD-SP4): 61.2 %
BH CV ECHO MEAS - EF(TEICH): 51.7 %
BH CV ECHO MEAS - ESV(CUBED): 19 ML
BH CV ECHO MEAS - ESV(MOD-SP4): 35.4 ML
BH CV ECHO MEAS - ESV(TEICH): 26.3 ML
BH CV ECHO MEAS - FS: 25.8 %
BH CV ECHO MEAS - IVS/LVPW: 1.1
BH CV ECHO MEAS - IVSD: 1.1 CM
BH CV ECHO MEAS - LA DIMENSION: 3.1 CM
BH CV ECHO MEAS - LA/AO: 1.2
BH CV ECHO MEAS - LAT PEAK E' VEL: 9.9 CM/SEC
BH CV ECHO MEAS - LV DIASTOLIC VOL/BSA (35-75): 46.4 ML/M^2
BH CV ECHO MEAS - LV MASS(C)D: 121.2 GRAMS
BH CV ECHO MEAS - LV MASS(C)DI: 61.6 GRAMS/M^2
BH CV ECHO MEAS - LV MAX PG: 2.7 MMHG
BH CV ECHO MEAS - LV MEAN PG: 2 MMHG
BH CV ECHO MEAS - LV SYSTOLIC VOL/BSA (12-30): 18 ML/M^2
BH CV ECHO MEAS - LV V1 MAX: 82.8 CM/SEC
BH CV ECHO MEAS - LV V1 MEAN: 58.3 CM/SEC
BH CV ECHO MEAS - LV V1 VTI: 18.8 CM
BH CV ECHO MEAS - LVIDD: 3.6 CM
BH CV ECHO MEAS - LVIDS: 2.7 CM
BH CV ECHO MEAS - LVLD AP4: 8.1 CM
BH CV ECHO MEAS - LVLS AP4: 7.5 CM
BH CV ECHO MEAS - LVOT AREA (M): 3.1 CM^2
BH CV ECHO MEAS - LVOT AREA: 3.1 CM^2
BH CV ECHO MEAS - LVOT DIAM: 2 CM
BH CV ECHO MEAS - LVPWD: 1.1 CM
BH CV ECHO MEAS - MED PEAK E' VEL: 7.2 CM/SEC
BH CV ECHO MEAS - MV A MAX VEL: 78.1 CM/SEC
BH CV ECHO MEAS - MV DEC SLOPE: 681 CM/SEC^2
BH CV ECHO MEAS - MV DEC TIME: 0.14 SEC
BH CV ECHO MEAS - MV E MAX VEL: 80 CM/SEC
BH CV ECHO MEAS - MV E/A: 1
BH CV ECHO MEAS - MV P1/2T MAX VEL: 110 CM/SEC
BH CV ECHO MEAS - MV P1/2T: 47.3 MSEC
BH CV ECHO MEAS - MVA P1/2T LCG: 2 CM^2
BH CV ECHO MEAS - MVA(P1/2T): 4.7 CM^2
BH CV ECHO MEAS - RAP SYSTOLE: 5 MMHG
BH CV ECHO MEAS - RVSP: 34.4 MMHG
BH CV ECHO MEAS - SI(AO): 76.5 ML/M^2
BH CV ECHO MEAS - SI(CUBED): 14 ML/M^2
BH CV ECHO MEAS - SI(LVOT): 30 ML/M^2
BH CV ECHO MEAS - SI(MOD-SP4): 28.4 ML/M^2
BH CV ECHO MEAS - SI(TEICH): 14.3 ML/M^2
BH CV ECHO MEAS - SV(AO): 150.6 ML
BH CV ECHO MEAS - SV(CUBED): 27.6 ML
BH CV ECHO MEAS - SV(LVOT): 59.1 ML
BH CV ECHO MEAS - SV(MOD-SP4): 55.8 ML
BH CV ECHO MEAS - SV(TEICH): 28.2 ML
BH CV ECHO MEAS - TR MAX VEL: 271 CM/SEC
BH CV ECHO MEASUREMENTS AVERAGE E/E' RATIO: 9.36
BILIRUB SERPL-MCNC: 0.2 MG/DL (ref 0.2–1.2)
BILIRUB UR QL STRIP: NEGATIVE
BODY TEMPERATURE: 37 C
BUN BLD-MCNC: 15 MG/DL (ref 6–20)
BUN BLD-MCNC: 18 MG/DL (ref 6–20)
BUN/CREAT SERPL: 13.3 (ref 7–25)
BUN/CREAT SERPL: 14.8 (ref 7–25)
BUPRENORPHINE SERPL-MCNC: NEGATIVE NG/ML
CALCIUM SPEC-SCNC: 7.7 MG/DL (ref 8.6–10.5)
CALCIUM SPEC-SCNC: 7.9 MG/DL (ref 8.6–10.5)
CANNABINOIDS SERPL QL: POSITIVE
CHLORIDE SERPL-SCNC: 106 MMOL/L (ref 98–107)
CHLORIDE SERPL-SCNC: 106 MMOL/L (ref 98–107)
CK SERPL-CCNC: 1508 U/L (ref 20–180)
CLARITY UR: ABNORMAL
CO2 SERPL-SCNC: 24 MMOL/L (ref 22–29)
CO2 SERPL-SCNC: 25 MMOL/L (ref 22–29)
COCAINE UR QL: NEGATIVE
COLOR UR: YELLOW
CREAT BLD-MCNC: 1.13 MG/DL (ref 0.57–1)
CREAT BLD-MCNC: 1.22 MG/DL (ref 0.57–1)
DEPRECATED RDW RBC AUTO: 51.1 FL (ref 37–54)
EOSINOPHIL # BLD AUTO: 0 10*3/MM3 (ref 0–0.4)
EOSINOPHIL NFR BLD AUTO: 0 % (ref 0.3–6.2)
ERYTHROCYTE [DISTWIDTH] IN BLOOD BY AUTOMATED COUNT: 14 % (ref 12.3–15.4)
ETHANOL UR QL: <0.01 %
FINE GRAN CASTS URNS QL MICRO: ABNORMAL /LPF
GFR SERPL CREATININE-BSD FRML MDRD: 49 ML/MIN/1.73
GFR SERPL CREATININE-BSD FRML MDRD: 53 ML/MIN/1.73
GLOBULIN UR ELPH-MCNC: 2.9 GM/DL
GLUCOSE BLD-MCNC: 111 MG/DL (ref 65–99)
GLUCOSE BLD-MCNC: 113 MG/DL (ref 65–99)
GLUCOSE UR STRIP-MCNC: NEGATIVE MG/DL
HCO3 BLDA-SCNC: 22.2 MMOL/L (ref 20–26)
HCT VFR BLD AUTO: 42.1 % (ref 34–46.6)
HGB BLD-MCNC: 13 G/DL (ref 12–15.9)
HGB UR QL STRIP.AUTO: ABNORMAL
HOROWITZ INDEX BLD+IHG-RTO: 100 %
HYALINE CASTS UR QL AUTO: ABNORMAL /LPF
IMM GRANULOCYTES # BLD AUTO: 0.04 10*3/MM3 (ref 0–0.05)
IMM GRANULOCYTES NFR BLD AUTO: 0.8 % (ref 0–0.5)
INR PPP: 1.09 (ref 0.91–1.09)
KETONES UR QL STRIP: ABNORMAL
LEFT ATRIUM VOLUME INDEX: 18.8 ML/M2
LEUKOCYTE ESTERASE UR QL STRIP.AUTO: NEGATIVE
LV EF 2D ECHO EST: 50 %
LYMPHOCYTES # BLD AUTO: 0.67 10*3/MM3 (ref 0.7–3.1)
LYMPHOCYTES NFR BLD AUTO: 13.6 % (ref 19.6–45.3)
Lab: ABNORMAL
MAGNESIUM SERPL-MCNC: 1.9 MG/DL (ref 1.6–2.6)
MAXIMAL PREDICTED HEART RATE: 180 BPM
MCH RBC QN AUTO: 30.7 PG (ref 26.6–33)
MCHC RBC AUTO-ENTMCNC: 30.9 G/DL (ref 31.5–35.7)
MCV RBC AUTO: 99.3 FL (ref 79–97)
METHADONE UR QL SCN: POSITIVE
MODALITY: ABNORMAL
MONOCYTES # BLD AUTO: 0.32 10*3/MM3 (ref 0.1–0.9)
MONOCYTES NFR BLD AUTO: 6.5 % (ref 5–12)
NEUTROPHILS # BLD AUTO: 3.89 10*3/MM3 (ref 1.7–7)
NEUTROPHILS NFR BLD AUTO: 78.7 % (ref 42.7–76)
NITRITE UR QL STRIP: NEGATIVE
NRBC BLD AUTO-RTO: 0 /100 WBC (ref 0–0.2)
OPIATES UR QL: NEGATIVE
OXYCODONE UR QL SCN: NEGATIVE
PCO2 BLDA: 45.9 MM HG (ref 35–45)
PCP UR QL SCN: NEGATIVE
PEEP RESPIRATORY: 8 CM[H2O]
PH BLDA: 7.29 PH UNITS (ref 7.35–7.45)
PH UR STRIP.AUTO: 5.5 [PH] (ref 5–8)
PLATELET # BLD AUTO: 315 10*3/MM3 (ref 140–450)
PMV BLD AUTO: 10.2 FL (ref 6–12)
PO2 BLDA: 124 MM HG (ref 83–108)
POTASSIUM BLD-SCNC: 2.9 MMOL/L (ref 3.5–5.2)
POTASSIUM BLD-SCNC: 3.3 MMOL/L (ref 3.5–5.2)
PROPOXYPH UR QL: NEGATIVE
PROT SERPL-MCNC: 6.6 G/DL (ref 6–8.5)
PROT UR QL STRIP: ABNORMAL
PROTHROMBIN TIME: 14.5 SECONDS (ref 11.9–14.6)
RBC # BLD AUTO: 4.24 10*6/MM3 (ref 3.77–5.28)
RBC # UR: ABNORMAL /HPF
REF LAB TEST METHOD: ABNORMAL
SAO2 % BLDCOA: 99.1 % (ref 94–99)
SET MECH RESP RATE: 16
SODIUM BLD-SCNC: 150 MMOL/L (ref 136–145)
SODIUM BLD-SCNC: 150 MMOL/L (ref 136–145)
SP GR UR STRIP: 1.02 (ref 1–1.03)
SQUAMOUS #/AREA URNS HPF: ABNORMAL /HPF
STRESS TARGET HR: 153 BPM
TRICYCLICS UR QL SCN: POSITIVE
TROPONIN T SERPL-MCNC: <0.01 NG/ML (ref 0–0.03)
UROBILINOGEN UR QL STRIP: ABNORMAL
VENTILATOR MODE: AC
VT ON VENT VENT: 600 ML
WBC NRBC COR # BLD: 4.94 10*3/MM3 (ref 3.4–10.8)
WBC UR QL AUTO: ABNORMAL /HPF

## 2020-01-01 PROCEDURE — 95819 EEG AWAKE AND ASLEEP: CPT

## 2020-01-01 PROCEDURE — 99255 IP/OBS CONSLTJ NEW/EST HI 80: CPT | Performed by: INTERNAL MEDICINE

## 2020-01-01 PROCEDURE — 82803 BLOOD GASES ANY COMBINATION: CPT

## 2020-01-01 PROCEDURE — 84484 ASSAY OF TROPONIN QUANT: CPT | Performed by: INTERNAL MEDICINE

## 2020-01-01 PROCEDURE — 85610 PROTHROMBIN TIME: CPT | Performed by: INTERNAL MEDICINE

## 2020-01-01 PROCEDURE — 71045 X-RAY EXAM CHEST 1 VIEW: CPT

## 2020-01-01 PROCEDURE — 0 GADOBENATE DIMEGLUMINE 529 MG/ML SOLUTION: Performed by: INTERNAL MEDICINE

## 2020-01-01 PROCEDURE — 95813 EEG EXTND MNTR 61-119 MIN: CPT | Performed by: PSYCHIATRY & NEUROLOGY

## 2020-01-01 PROCEDURE — 25010000002 THIAMINE PER 100 MG: Performed by: PSYCHIATRY & NEUROLOGY

## 2020-01-01 PROCEDURE — 93306 TTE W/DOPPLER COMPLETE: CPT

## 2020-01-01 PROCEDURE — 93010 ELECTROCARDIOGRAM REPORT: CPT | Performed by: INTERNAL MEDICINE

## 2020-01-01 PROCEDURE — 70450 CT HEAD/BRAIN W/O DYE: CPT

## 2020-01-01 PROCEDURE — 80053 COMPREHEN METABOLIC PANEL: CPT | Performed by: INTERNAL MEDICINE

## 2020-01-01 PROCEDURE — 70553 MRI BRAIN STEM W/O & W/DYE: CPT

## 2020-01-01 PROCEDURE — 94799 UNLISTED PULMONARY SVC/PX: CPT

## 2020-01-01 PROCEDURE — 83735 ASSAY OF MAGNESIUM: CPT | Performed by: INTERNAL MEDICINE

## 2020-01-01 PROCEDURE — 95813 EEG EXTND MNTR 61-119 MIN: CPT

## 2020-01-01 PROCEDURE — 5A1935Z RESPIRATORY VENTILATION, LESS THAN 24 CONSECUTIVE HOURS: ICD-10-PCS | Performed by: INTERNAL MEDICINE

## 2020-01-01 PROCEDURE — 25810000003 DEXTROSE 5 % WITH KCL 20 MEQ 20-5 MEQ/L-% SOLUTION: Performed by: INTERNAL MEDICINE

## 2020-01-01 PROCEDURE — 80307 DRUG TEST PRSMV CHEM ANLYZR: CPT | Performed by: INTERNAL MEDICINE

## 2020-01-01 PROCEDURE — 25010000002 ENOXAPARIN PER 10 MG: Performed by: INTERNAL MEDICINE

## 2020-01-01 PROCEDURE — 74018 RADEX ABDOMEN 1 VIEW: CPT

## 2020-01-01 PROCEDURE — 25010000002 PERFLUTREN 6.52 MG/ML SUSPENSION: Performed by: INTERNAL MEDICINE

## 2020-01-01 PROCEDURE — 25010000002 HYDRALAZINE PER 20 MG: Performed by: INTERNAL MEDICINE

## 2020-01-01 PROCEDURE — 94002 VENT MGMT INPAT INIT DAY: CPT

## 2020-01-01 PROCEDURE — 82550 ASSAY OF CK (CPK): CPT | Performed by: INTERNAL MEDICINE

## 2020-01-01 PROCEDURE — 93005 ELECTROCARDIOGRAM TRACING: CPT | Performed by: INTERNAL MEDICINE

## 2020-01-01 PROCEDURE — 99291 CRITICAL CARE FIRST HOUR: CPT | Performed by: PSYCHIATRY & NEUROLOGY

## 2020-01-01 PROCEDURE — 87040 BLOOD CULTURE FOR BACTERIA: CPT | Performed by: INTERNAL MEDICINE

## 2020-01-01 PROCEDURE — 85025 COMPLETE CBC W/AUTO DIFF WBC: CPT | Performed by: INTERNAL MEDICINE

## 2020-01-01 PROCEDURE — 25010000003 LEVETIRACETAM IN NACL 0.75% 1000 MG/100ML SOLUTION: Performed by: INTERNAL MEDICINE

## 2020-01-01 PROCEDURE — 25010000002 POTASSIUM CHLORIDE PER 2 MEQ OF POTASSIUM: Performed by: INTERNAL MEDICINE

## 2020-01-01 PROCEDURE — 85730 THROMBOPLASTIN TIME PARTIAL: CPT | Performed by: INTERNAL MEDICINE

## 2020-01-01 PROCEDURE — 25010000002 LORAZEPAM PER 2 MG

## 2020-01-01 PROCEDURE — 93306 TTE W/DOPPLER COMPLETE: CPT | Performed by: INTERNAL MEDICINE

## 2020-01-01 PROCEDURE — 36600 WITHDRAWAL OF ARTERIAL BLOOD: CPT

## 2020-01-01 PROCEDURE — A9577 INJ MULTIHANCE: HCPCS | Performed by: INTERNAL MEDICINE

## 2020-01-01 PROCEDURE — 81001 URINALYSIS AUTO W/SCOPE: CPT | Performed by: INTERNAL MEDICINE

## 2020-01-01 RX ORDER — POTASSIUM CHLORIDE 29.8 MG/ML
20 INJECTION INTRAVENOUS ONCE
Status: DISCONTINUED | OUTPATIENT
Start: 2020-01-01 | End: 2020-01-01

## 2020-01-01 RX ORDER — ACETAMINOPHEN 650 MG/1
650 SUPPOSITORY RECTAL EVERY 4 HOURS PRN
Status: DISCONTINUED | OUTPATIENT
Start: 2020-01-01 | End: 2020-01-01 | Stop reason: HOSPADM

## 2020-01-01 RX ORDER — LEVETIRACETAM 10 MG/ML
1000 INJECTION INTRAVASCULAR ONCE
Status: COMPLETED | OUTPATIENT
Start: 2020-01-01 | End: 2020-01-01

## 2020-01-01 RX ORDER — LORAZEPAM 2 MG/ML
2 INJECTION INTRAMUSCULAR EVERY 4 HOURS PRN
Status: DISCONTINUED | OUTPATIENT
Start: 2020-01-01 | End: 2020-01-01 | Stop reason: HOSPADM

## 2020-01-01 RX ORDER — POTASSIUM CHLORIDE, DEXTROSE MONOHYDRATE 150; 5 MG/100ML; G/100ML
75 INJECTION, SOLUTION INTRAVENOUS CONTINUOUS
Status: DISCONTINUED | OUTPATIENT
Start: 2020-01-01 | End: 2020-01-01 | Stop reason: HOSPADM

## 2020-01-01 RX ORDER — MORPHINE SULFATE 2 MG/ML
2 INJECTION, SOLUTION INTRAMUSCULAR; INTRAVENOUS
Status: DISCONTINUED | OUTPATIENT
Start: 2020-01-01 | End: 2020-01-01 | Stop reason: HOSPADM

## 2020-01-01 RX ORDER — ACETAMINOPHEN 160 MG/5ML
650 SOLUTION ORAL EVERY 6 HOURS PRN
Status: DISCONTINUED | OUTPATIENT
Start: 2020-01-01 | End: 2020-01-01 | Stop reason: HOSPADM

## 2020-01-01 RX ORDER — HYDRALAZINE HYDROCHLORIDE 20 MG/ML
10 INJECTION INTRAMUSCULAR; INTRAVENOUS EVERY 4 HOURS PRN
Status: DISCONTINUED | OUTPATIENT
Start: 2020-01-01 | End: 2020-01-01

## 2020-01-01 RX ORDER — SODIUM CHLORIDE 0.9 % (FLUSH) 0.9 %
10 SYRINGE (ML) INJECTION EVERY 12 HOURS SCHEDULED
Status: DISCONTINUED | OUTPATIENT
Start: 2020-01-01 | End: 2020-01-01 | Stop reason: HOSPADM

## 2020-01-01 RX ORDER — LORAZEPAM 2 MG/ML
2 CONCENTRATE ORAL ONCE
Status: COMPLETED | OUTPATIENT
Start: 2020-01-01 | End: 2020-01-01

## 2020-01-01 RX ORDER — SODIUM CHLORIDE 0.9 % (FLUSH) 0.9 %
10 SYRINGE (ML) INJECTION AS NEEDED
Status: DISCONTINUED | OUTPATIENT
Start: 2020-01-01 | End: 2020-01-01 | Stop reason: HOSPADM

## 2020-01-01 RX ORDER — LABETALOL HYDROCHLORIDE 5 MG/ML
20 INJECTION, SOLUTION INTRAVENOUS ONCE
Status: COMPLETED | OUTPATIENT
Start: 2020-01-01 | End: 2020-01-01

## 2020-01-01 RX ORDER — SODIUM CHLORIDE 9 MG/ML
100 INJECTION, SOLUTION INTRAVENOUS CONTINUOUS
Status: DISCONTINUED | OUTPATIENT
Start: 2020-01-01 | End: 2020-01-01

## 2020-01-01 RX ORDER — LORAZEPAM 2 MG/ML
INJECTION INTRAMUSCULAR
Status: COMPLETED
Start: 2020-01-01 | End: 2020-01-01

## 2020-01-01 RX ORDER — HYDRALAZINE HYDROCHLORIDE 20 MG/ML
20 INJECTION INTRAMUSCULAR; INTRAVENOUS EVERY 4 HOURS PRN
Status: DISCONTINUED | OUTPATIENT
Start: 2020-01-01 | End: 2020-01-01 | Stop reason: HOSPADM

## 2020-01-01 RX ORDER — FAMOTIDINE 10 MG/ML
20 INJECTION, SOLUTION INTRAVENOUS EVERY 12 HOURS SCHEDULED
Status: DISCONTINUED | OUTPATIENT
Start: 2020-01-01 | End: 2020-01-01 | Stop reason: HOSPADM

## 2020-01-01 RX ADMIN — HYDRALAZINE HYDROCHLORIDE 10 MG: 20 INJECTION INTRAMUSCULAR; INTRAVENOUS at 17:30

## 2020-01-01 RX ADMIN — GADOBENATE DIMEGLUMINE 20 ML: 529 INJECTION, SOLUTION INTRAVENOUS at 21:45

## 2020-01-01 RX ADMIN — SODIUM CHLORIDE, PRESERVATIVE FREE 10 ML: 5 INJECTION INTRAVENOUS at 23:43

## 2020-01-01 RX ADMIN — PERFLUTREN 8.48 MG: 6.52 INJECTION, SUSPENSION INTRAVENOUS at 14:33

## 2020-01-01 RX ADMIN — ACETAMINOPHEN 649.6 MG: 650 SOLUTION ORAL at 20:21

## 2020-01-01 RX ADMIN — HYDRALAZINE HYDROCHLORIDE 20 MG: 20 INJECTION INTRAMUSCULAR; INTRAVENOUS at 19:58

## 2020-01-01 RX ADMIN — SODIUM CHLORIDE, PRESERVATIVE FREE 10 ML: 5 INJECTION INTRAVENOUS at 13:00

## 2020-01-01 RX ADMIN — ENOXAPARIN SODIUM 100 MG: 100 INJECTION SUBCUTANEOUS at 18:38

## 2020-01-01 RX ADMIN — SODIUM CHLORIDE 100 ML/HR: 9 INJECTION, SOLUTION INTRAVENOUS at 16:02

## 2020-01-01 RX ADMIN — POTASSIUM CHLORIDE 50 ML/HR: 2 INJECTION, SOLUTION, CONCENTRATE INTRAVENOUS at 18:36

## 2020-01-01 RX ADMIN — LEVETIRACETAM 1000 MG: 10 INJECTION INTRAVENOUS at 14:45

## 2020-01-01 RX ADMIN — LORAZEPAM 1 MG: 2 SOLUTION, CONCENTRATE ORAL at 15:45

## 2020-01-01 RX ADMIN — VALPROATE SODIUM 500 MG: 100 INJECTION, SOLUTION INTRAVENOUS at 16:58

## 2020-01-01 RX ADMIN — LABETALOL HYDROCHLORIDE 20 MG: 5 INJECTION INTRAVENOUS at 19:07

## 2020-01-01 RX ADMIN — POTASSIUM CHLORIDE AND DEXTROSE MONOHYDRATE 75 ML/HR: 150; 5 INJECTION, SOLUTION INTRAVENOUS at 19:23

## 2020-01-01 RX ADMIN — FAMOTIDINE 20 MG: 10 INJECTION, SOLUTION INTRAVENOUS at 13:30

## 2020-01-01 RX ADMIN — FAMOTIDINE 20 MG: 10 INJECTION, SOLUTION INTRAVENOUS at 20:01

## 2020-01-01 RX ADMIN — THIAMINE HYDROCHLORIDE 100 MG: 100 INJECTION, SOLUTION INTRAMUSCULAR; INTRAVENOUS at 18:37

## 2020-01-01 RX ADMIN — LORAZEPAM 1 MG: 2 INJECTION INTRAMUSCULAR; INTRAVENOUS at 15:40

## 2020-01-01 RX ADMIN — VALPROATE SODIUM 500 MG: 100 INJECTION, SOLUTION INTRAVENOUS at 23:44

## 2020-01-10 PROBLEM — I46.9 PEA (PULSELESS ELECTRICAL ACTIVITY) (HCC): Status: ACTIVE | Noted: 2020-01-01

## 2020-01-10 NOTE — CONSULTS
PULMONARY AND CRITICAL CARE CONSULT - [facility]    Jyoti Montano   MR# [id number]  Acct# [id number]  1/10/2020   3:45 PM    Referring Provider: Hung Uriarte*    Chief Complaint: Mechanically ventilated    HPI: We are consulted by Hung Uriarte* to see this 39 y.o. female born on 1980.  The patient can obviously provide no history but her mother states that she saw the patient at home last evening and she was awake and responsive then went back to check on her about 7:30 this morning and she was unconscious.  The patient's mother does state that the patient did seem to be breathing but was unresponsive.  She was taken to [facility] apparently had a cardiac arrest and currently is on mechanical ventilatory support.  EEG is ongoing.  She does have a history of substance abuse.  She also can provide no history.    Past Medical History   has no past medical history on file.   has no past surgical history on file.  Allergies   Allergen Reactions   • Codeine Rash     Medications    LORazepam      enoxaparin 1 mg/kg Subcutaneous Once   [START ON 1/11/2020] enoxaparin 1 mg/kg Subcutaneous Q12H   famotidine 20 mg Intravenous Q12H   LORazepam 2 mg Oral Once   sodium chloride 10 mL Intravenous Q12H       Pharmacy to Dose enoxaparin (LOVENOX)    sodium chloride 100 mL/hr     Social History     Family History  family history is not on file.  Review of Systems:  Cannot obtain due to mechanical ventilation.  The patient notably is critically ill and connected to a ventilator.  As such patient cannot communicate and provide any history whatsoever, including any history of present illness or interval history since arrival or review of systems. The interested reviewer may note this fact, as an attempt has been made at collecting and documenting these portions of the patient history, but this information is unobtainable despite attempted review and therefore cannot be  documented at this time.   Physical Exam:  Heart Rate:  [90] 90  BP: (117)/(79) 117/79No intake or output data in the 24 hours ending 01/10/20 1545      01/10/20  0848 01/10/20  1336   Weight: 99.8 kg (220 lb) 99.8 kg (220 lb)   There were no vitals filed for this visit.  Physical Exam   Constitutional:   She is an obese white female who is intubated on mechanical ventilatory support.   HENT:   Head: Normocephalic.   Eyes: Right eye exhibits no discharge. Left eye exhibits no discharge.   Neck:   Her neck is thick.   Cardiovascular: Normal rate and regular rhythm.   Pulmonary/Chest:   No adventitious sounds are heard.  Breath sounds are diminished.   Abdominal:   Abdomen is obese.   Musculoskeletal:   SCDs are in place.   Neurological:   She is intubated and unresponsive.   Skin: No rash noted.   Psychiatric:   She is intubated and unresponsive.   Nursing note and vitals reviewed.    Ventilator Settings:                       Resp Rate (Observed) Vent: 27                 Results from last 7 days   Lab Units 01/10/20  1204   WBC 10*3/mm3 4.94   HEMOGLOBIN g/dL 13.0   PLATELETS 10*3/mm3 315     Results from last 7 days   Lab Units 01/10/20  1206 01/10/20  1204   SODIUM mmol/L  --  150*   POTASSIUM mmol/L  --  2.9*   CO2 mmol/L  --  25.0   BUN mg/dL  --  15   CREATININE mg/dL  --  1.13*   MAGNESIUM mg/dL 1.9  --    GLUCOSE mg/dL  --  111*     Results from last 7 days   Lab Units 01/10/20  1230   PH, ARTERIAL pH units 7.293*   PCO2, ARTERIAL mm Hg 45.9*   PO2 ART mm Hg 124.0*   FIO2 % 100     No results found for: BLOODCX, URINECX, WOUNDCX, MRSACX, RESPCX, STOOLCX  No results found for: PROBNP  Recent radiology:   Imaging Results (Last 72 Hours)     Procedure Component Value Units Date/Time    CT Head Without Contrast [[id number]] Collected:  01/10/20 1325     Updated:  01/10/20 1340    Narrative:       EXAMINATION: CT HEAD WO CONTRAST- 1/10/2020 1:25 PM CST     HISTORY: Acute encephalopathy; r/o bleed.     DOSE: 813   mGycm (Automatic exposure control technique was implemented  in an effort to keep the radiation dose as low as possible without  compromising image quality)     REPORT: Spiral CT of the head was performed without contrast,  reconstructed coronal and sagittal images are also reviewed.     COMPARISON: CT head without contrast on 09/27/2013.     There is motion and streak artifact which limits the study, however no  definite intracranial hemorrhage, mass or mass effect is identified. The  ventricles and basal cisterns are within normal limits. Differentiation  between gray and white matter is poor. There appears to be sulcal  effacement over the convexity diffusely, though this may be exaggerated  due to to motion. Review of bone windows shows no obvious skull  fracture. There is normal aeration of the mastoid air cells. There are  air-fluid levels within the sphenoid and maxillary sinuses compatible  with acute sinusitis.       Impression:       1. Limited study due to patient motion artifact with no evidence of  intracranial hemorrhage, there is poor differentiation between gray and  white matter, with diffuse sulcal effacement and probable diffuse  cerebral edema. Correlation with clinical presentation is recommended.  MRI of the brain may be appropriate for follow-up.  2. Acute bilateral maxillary and sphenoid sinusitis.           This report was finalized on 01/10/2020 13:37 by Dr. August Lai MD.    XR Abdomen KUB [[id number]] Collected:  01/10/20 1303     Updated:  01/10/20 1306    Narrative:       EXAMINATION: KUB radiograph 01/10/2020     HISTORY: NG tube placement     FINDINGS: KUB radiograph reveals an NG tube has been successfully  advanced with the tip in the body of the stomach.  This report was finalized on 01/10/2020 13:03 by Dr. Malick Villareal MD.    XR Chest 1 View [[id number]] Collected:  01/10/20 1300     Updated:  01/10/20 1306    Narrative:       EXAMINATION: Chest 1 view 01/10/2020      HISTORY: Intubation     FINDINGS: Portable supine radiograph of the chest reveals an  endotracheal tube and NG tube have been placed and are well-positioned.  There is fullness of the right paratracheal stripe as well as the hilum  bilaterally. This may simply be related to volume overload with  engorgement of the azygos and hilar structures. Paratracheal and hilar  adenopathy such as could be seen with a lymphoproliferative disorder or  sarcoidosis should also be considered. Follow-up films are recommended.  Lungs are clear. There is no effusion or free air present.       Impression:       1.. Endotracheal tube and NG tube both well-positioned.  2. Fullness of the right paratracheal stripe as well as enlargement of  the hilum with differentials above. The lungs are clear.  This report was finalized on 01/10/2020 13:02 by Dr. Malick Villareal MD.        My radiograph interpretation/independent review of imaging: Endotracheal tube is in good position.  There is mild prominence of the right hilar area.  Other test results (not lab or imaging): Results for orders placed during the hospital encounter of 01/10/20   Adult Transthoracic Echo Complete W/ Cont if Necessary Per Protocol    Narrative · Technically limited study  · Estimated EF = 50%.  · Left ventricular systolic function is low normal.      2D echocardiogram shows an ejection fraction of 50% and no other abnormalities are noted.  Independent review of ekg: Normal sinus rhythm with prolonged QT interval.    Problem List as identified by Epic (may contain historical, inactive problems)  Patient Active Problem List   Diagnosis   • PEA (Pulseless electrical activity) (CMS/[facility])     Pulmonary Assessment:  SEVERE ACUTE RESPIRATORY FAILURE REQUIRING MECHANICAL VENTILATION  This is a threat to life or pulmonary function, high risk, due to coma and subsequent cardiac arrest.    New problem (to me), with additional workup planned:  1.  Cardiac arrest.  2.  Coma which  appears to have preceded the cardiac arrest at least per her mother.  Again her mother states that when she found the patient this morning she was unresponsive but was breathing with somewhat labored and what she describes as gurgling respirations.  3.  Respiratory failure due to #1 and #2.  4.  Metabolic acidosis.  New problem (to me), no additional workup planned:  1.  History of substance abuse.  Other problems either stable, failing to improve or worsenin. Obesity.    Recommend/plan:   Chest X-ray in the morning tomorrow  Arterial blood gas analysis in the morning tomorrow  Additional plan:  · We will decrease her FiO2.  Otherwise we will defer management of her current problems to her other physicians.    Thank you for this consult.  We will follow along.  Electronically signed by Leonel Jack MD on 1/10/2020 at 3:45 PM

## 2020-01-10 NOTE — H&P
[facility]  HISTORY AND PHYSICAL    Date of Admission: 1/10/2020  Primary Care Physician: Vonda Howard APRN    Subjective     Chief Complaint: PEA    History of Present Illness  Spoke to  who was transferring the patient to our hospital intubated and mechanically ventilated.  She is a 39-year-old woman who is morbidly obese.  She had right lower extremity cast for an unclear reason but may be ankle fracture.  I am not certain of her mobility.   told me that she was apneic and unresponsive when she was found by emergency medical service.  She subsequently became pulseless and then asystole.  She had approximately 30 minutes of CPR time.  She receive benzodiazepine, fentanyl prior to transfer  She also received Narcan  Work-up reviewed from outside hospital noting pH of 6.8, creatinine of 2, urine drug screen positive for THC.  Record indicates that she takes methadone, oxycodone and clonazepam however not found in urine drug screen.  Review of other record indicates that she had prior hospitalization for unintentional opiate overdose in December 2019  Among the differential follow-up initially were PE, drug overdose, coronary event  So far she had a head CT scan    Review of Systems   Patient is critically ill intubated and unresponsive.  No family member at bedside therefore information's are gathered from review of record    Past Medical History: Not obtainable     past Surgical History: Not obtainable     social History:  Not obtainable    Family History: Not obtainable  Allergies:  Allergies not on file  Medications: Not obtainable  Prior to Admission medications    Not on File     Objective     Vital Signs: Wt 99.8 kg (220 lb)    Vitals:    01/10/20 1100   BP: 117/79   Pulse: 90       Physical Exam   Constitutional: She appears well-developed. No distress.   Mottled skin, bear hugger in place.  Her temp was 92F., overbreathing the  vent   HENT:   Head: Normocephalic and atraumatic.   Right Ear: External ear normal.   Left Ear: External ear normal.   Intubated, noted scar ant neck possible prior trach or thyroid surgery.  Suspect the former.   Eyes: Right eye exhibits no discharge. Left eye exhibits no discharge. No scleral icterus.   Sluggishly RTL   Neck: No JVD present. No tracheal deviation present. No thyromegaly present.   Pulmonary/Chest: No stridor. No respiratory distress. She has no wheezes. She has no rales.   Intubated, on mech vent  Respiratory  rate of 12, 100% FiO2, PEEP of 8, low volume 600   Abdominal: Soft. She exhibits no distension and no mass. There is no tenderness. There is no guarding.   Neurological:   She decerebrate posture on sternal rub but only on left UE   Skin: Skin is warm and dry. Capillary refill takes 2 to 3 seconds.   Psychiatric:   Flat affect   Vitals reviewed.  left groin central line        Results Reviewed:  Lab Results (last 24 hours)     ** No results found for the last 24 hours. **        Imaging Results (Last 24 Hours)     Procedure Component Value Units Date/Time    XR Abdomen KUB [[id number]] Resulted:  01/10/20 1108     Updated:  01/10/20 1108    XR Chest 1 View [[id number]] Resulted:  01/10/20 1058     Updated:  01/10/20 1058        I have personally reviewed and interpreted the radiology studies and ECG obtained at time of admission.     Assessment / Plan     Assessment:   There are no active hospital problems to display for this patient.  PEA  Acute respiratory failure  encephalopathy  FAITH  + alcohol level in blood  UDS + THC  Recent LE surgical procedure (?) ankle surgery - has cast  RLE; at risk of PE  Chronic back pain  Hypothermia      Plan:   Repeat labs, ck, ua with culture,cxr; head ct, repeat blood gas  Vent/abg/pulmonary consult  Head ct  Bear hugger   ekg  ivf  ngt  Echo  Will check head ct before empirically treating with Lovenox for possible PE unitl ruled out or no longer a great  concern.   Other plans when more info arrives  I have considered code chilling this patient although there is also suspicion of PE as a cause of the PEA.  1 of the exclusion criteria reviewed was coagulopathy that cannot be reversed.  Uncontrolled bleeding. conferenced with nursing as to protocol. Lots of unclear information yet  Based on available information there is no current rush for me to do code chill.  Critical care time > 30 mins       Code Status: full code   I discussed the patient's findings and my recommendations with the nurse Rosanna   Estimated length of stay to be determined     Hung Uriarte MD   01/10/20   11:34 AM      Appreciate well outlined information from Dr. Templeton's consult report      Lab Results (last 24 hours)     Procedure Component Value Units Date/Time    Basic Metabolic Panel [[id number]]  (Abnormal) Collected:  01/10/20 1801    Specimen:  Blood Updated:  01/10/20 1821     Glucose 113 mg/dL      BUN 18 mg/dL      Creatinine 1.22 mg/dL      Sodium 150 mmol/L      Potassium 3.3 mmol/L      Chloride 106 mmol/L      CO2 24.0 mmol/L      Calcium 7.9 mg/dL      eGFR Non African Amer 49 mL/min/1.73      BUN/Creatinine Ratio 14.8     Anion Gap 20.0 mmol/L     Narrative:       GFR Normal >60  Chronic Kidney Disease <60  Kidney Failure <15      Urinalysis, Microscopic Only - Urine, Catheter [[id number]]  (Abnormal) Collected:  01/10/20 1710    Specimen:  Urine, Catheter Updated:  01/10/20 1751     RBC, UA 6-12 /HPF      WBC, UA 3-5 /HPF      Bacteria, UA 1+ /HPF      Squamous Epithelial Cells, UA 3-6 /HPF      Hyaline Casts, UA 7-12 /LPF      Fine Granular Casts, UA 3-6 /LPF      Methodology Manual Light Microscopy    Urine Drug Screen - Urine, Clean Catch [[id number]]  (Abnormal) Collected:  01/10/20 1709    Specimen:  Urine, Clean Catch Updated:  01/10/20 1746     THC, Screen, Urine Positive     Phencyclidine (PCP), Urine Negative     Cocaine Screen, Urine Negative      Methamphetamine, Ur Negative     Opiate Screen Negative     Amphetamine Screen, Urine Negative     Benzodiazepine Screen, Urine Negative     Tricyclic Antidepressants Screen Positive     Methadone Screen, Urine Positive     Barbiturates Screen, Urine Negative     Oxycodone Screen, Urine Negative     Propoxyphene Screen Negative     Buprenorphine, Screen, Urine Negative    Narrative:       Cutoff For Drugs Screened:    Amphetamines               500 ng/ml  Barbiturates               200 ng/ml  Benzodiazepines            150 ng/ml  Cocaine                    150 ng/ml  Methadone                  200 ng/ml  Opiates                    100 ng/ml  Phencyclidine               25 ng/ml  THC                            50 ng/ml  Methamphetamine            500 ng/ml  Tricyclic Antidepressants  300 ng/ml  Oxycodone                  100 ng/ml  Propoxyphene               300 ng/ml  Buprenorphine               10 ng/ml    The normal value for all drugs tested is negative. This report includes unconfirmed screening results, with the cutoff values listed, to be used for medical treatment purposes only.  Unconfirmed results must not be used for non-medical purposes such as employment or legal testing.  Clinical consideration should be applied to any drug of abuse test, particularly when unconfirmed results are used.      Urinalysis With Culture If Indicated - Urine, Catheter [[id number]]  (Abnormal) Collected:  01/10/20 1710    Specimen:  Urine, Catheter Updated:  01/10/20 1720     Color, UA Yellow     Appearance, UA Cloudy     pH, UA 5.5     Specific Gravity, UA 1.023     Glucose, UA Negative     Ketones, UA Trace     Bilirubin, UA Negative     Blood, UA Moderate (2+)     Protein,  mg/dL (2+)     Leuk Esterase, UA Negative     Nitrite, UA Negative     Urobilinogen, UA 0.2 E.U./dL    Comprehensive Metabolic Panel [[id number]]  (Abnormal) Collected:  01/10/20 1204    Specimen:  Blood Updated:  01/10/20 1312     Glucose 111  mg/dL      BUN 15 mg/dL      Creatinine 1.13 mg/dL      Sodium 150 mmol/L      Potassium 2.9 mmol/L      Chloride 106 mmol/L      CO2 25.0 mmol/L      Calcium 7.7 mg/dL      Total Protein 6.6 g/dL      Albumin 3.70 g/dL      ALT (SGPT) 43 U/L      AST (SGOT) 76 U/L      Alkaline Phosphatase 90 U/L      Total Bilirubin 0.2 mg/dL      eGFR Non African Amer 53 mL/min/1.73      Globulin 2.9 gm/dL      A/G Ratio 1.3 g/dL      BUN/Creatinine Ratio 13.3     Anion Gap 19.0 mmol/L     Narrative:       GFR Normal >60  Chronic Kidney Disease <60  Kidney Failure <15      Troponin [[id number]]  (Normal) Collected:  01/10/20 1204    Specimen:  Blood Updated:  01/10/20 1312     Troponin T <0.010 ng/mL     Narrative:       Troponin T Reference Range:  <= 0.03 ng/mL-   Negative for AMI  >0.03 ng/mL-     Abnormal for myocardial necrosis.  Clinicians would have to utilize clinical acumen, EKG, Troponin and serial changes to determine if it is an Acute Myocardial Infarction or myocardial injury due to an underlying chronic condition.       Results may be falsely decreased if patient taking Biotin.      CK [[id number]]  (Abnormal) Collected:  01/10/20 1204    Specimen:  Blood Updated:  01/10/20 1312     Creatine Kinase 1,508 U/L     aPTT [[id number]]  (Normal) Collected:  01/10/20 1204    Specimen:  Blood Updated:  01/10/20 1307     PTT 26.4 seconds     Protime-INR [[id number]]  (Normal) Collected:  01/10/20 1204    Specimen:  Blood Updated:  01/10/20 1307     Protime 14.5 Seconds      INR 1.09    Ethanol [[id number]] Collected:  01/10/20 1206    Specimen:  Blood Updated:  01/10/20 1304     Ethanol % <0.010 %     Narrative:       Not for legal purposes. Chain of Custody not followed.     Magnesium [[id number]]  (Normal) Collected:  01/10/20 1206    Specimen:  Blood Updated:  01/10/20 1304     Magnesium 1.9 mg/dL     CBC & Differential [[id number]] Collected:  01/10/20 1204    Specimen:  Blood Updated:  01/10/20 1251    Narrative:       The  following orders were created for panel order CBC & Differential.  Procedure                               Abnormality         Status                     ---------                               -----------         ------                     CBC Auto Differential[[id number]]        Abnormal            Final result                 Please view results for these tests on the individual orders.    CBC Auto Differential [[id number]]  (Abnormal) Collected:  01/10/20 1204    Specimen:  Blood Updated:  01/10/20 1251     WBC 4.94 10*3/mm3      RBC 4.24 10*6/mm3      Hemoglobin 13.0 g/dL      Hematocrit 42.1 %      MCV 99.3 fL      MCH 30.7 pg      MCHC 30.9 g/dL      RDW 14.0 %      RDW-SD 51.1 fl      MPV 10.2 fL      Platelets 315 10*3/mm3      Neutrophil % 78.7 %      Lymphocyte % 13.6 %      Monocyte % 6.5 %      Eosinophil % 0.0 %      Basophil % 0.4 %      Immature Grans % 0.8 %      Neutrophils, Absolute 3.89 10*3/mm3      Lymphocytes, Absolute 0.67 10*3/mm3      Monocytes, Absolute 0.32 10*3/mm3      Eosinophils, Absolute 0.00 10*3/mm3      Basophils, Absolute 0.02 10*3/mm3      Immature Grans, Absolute 0.04 10*3/mm3      nRBC 0.0 /100 WBC     Blood Culture - Blood, Arm, Left [[id number]] Collected:  01/10/20 1151    Specimen:  Blood from Arm, Left Updated:  01/10/20 1247    Blood Gas, Arterial [[id number]]  (Abnormal) Collected:  01/10/20 1230    Specimen:  Arterial Blood Updated:  01/10/20 1239     Site Right Radial     Fuad's Test Positive     pH, Arterial 7.293 pH units      Comment: 84 Value below reference range        pCO2, Arterial 45.9 mm Hg      Comment: 83 Value above reference range        pO2, Arterial 124.0 mm Hg      Comment: 83 Value above reference range        HCO3, Arterial 22.2 mmol/L      Base Excess, Arterial -4.5 mmol/L      Comment: 84 Value below reference range        O2 Saturation, Arterial 99.1 %      Comment: 83 Value above reference range        Temperature 37.0 C      Barometric Pressure  for Blood Gas 752 mmHg      Modality Ventilator     FIO2 100 %      Ventilator Mode AC     Set Tidal Volume 600     Set Mech Resp Rate 16.0     PEEP 8.0     Collected by [id number]     Comment: Meter: [id number]     :  [id number]           Imaging Results (All)     Procedure Component Value Units Date/Time    CT Head Without Contrast [[id number]] Collected:  01/10/20 1325     Updated:  01/10/20 1340    Narrative:       EXAMINATION: CT HEAD WO CONTRAST- 1/10/2020 1:25 PM CST     HISTORY: Acute encephalopathy; r/o bleed.     DOSE: 813  mGycm (Automatic exposure control technique was implemented  in an effort to keep the radiation dose as low as possible without  compromising image quality)     REPORT: Spiral CT of the head was performed without contrast,  reconstructed coronal and sagittal images are also reviewed.     COMPARISON: CT head without contrast on 09/27/2013.     There is motion and streak artifact which limits the study, however no  definite intracranial hemorrhage, mass or mass effect is identified. The  ventricles and basal cisterns are within normal limits. Differentiation  between gray and white matter is poor. There appears to be sulcal  effacement over the convexity diffusely, though this may be exaggerated  due to to motion. Review of bone windows shows no obvious skull  fracture. There is normal aeration of the mastoid air cells. There are  air-fluid levels within the sphenoid and maxillary sinuses compatible  with acute sinusitis.       Impression:       1. Limited study due to patient motion artifact with no evidence of  intracranial hemorrhage, there is poor differentiation between gray and  white matter, with diffuse sulcal effacement and probable diffuse  cerebral edema. Correlation with clinical presentation is recommended.  MRI of the brain may be appropriate for follow-up.  2. Acute bilateral maxillary and sphenoid sinusitis.           This report was finalized on 01/10/2020 13:37 by  Dr. August Lai MD.    XR Abdomen KUB [[id number]] Collected:  01/10/20 1303     Updated:  01/10/20 1306    Narrative:       EXAMINATION: KUB radiograph 01/10/2020     HISTORY: NG tube placement     FINDINGS: KUB radiograph reveals an NG tube has been successfully  advanced with the tip in the body of the stomach.  This report was finalized on 01/10/2020 13:03 by Dr. Malick Villareal MD.    XR Chest 1 View [[id number]] Collected:  01/10/20 1300     Updated:  01/10/20 1306    Narrative:       EXAMINATION: Chest 1 view 01/10/2020     HISTORY: Intubation     FINDINGS: Portable supine radiograph of the chest reveals an  endotracheal tube and NG tube have been placed and are well-positioned.  There is fullness of the right paratracheal stripe as well as the hilum  bilaterally. This may simply be related to volume overload with  engorgement of the azygos and hilar structures. Paratracheal and hilar  adenopathy such as could be seen with a lymphoproliferative disorder or  sarcoidosis should also be considered. Follow-up films are recommended.  Lungs are clear. There is no effusion or free air present.       Impression:       1.. Endotracheal tube and NG tube both well-positioned.  2. Fullness of the right paratracheal stripe as well as enlargement of  the hilum with differentials above. The lungs are clear.  This report was finalized on 01/10/2020 13:02 by Dr. Malick Villareal MD.        Currently her blood pressure is up.  I already had ordered some hydralazine and labetalol.  Surprisingly her left ventricular systolic function is still at 50 and reported as low normal.  Wall motion abnormality has not been described.    Pulmonary service came and adjusted vent setting  CK noted at 1508    Diagnoses  Acute respiratory failure (hypoxic and hypercarbic  PEA arrest  CK elevation possibly from rhabdomyolysis, chest compression/resuscitation effort  Encephalopathy possibly related to drug overdose  History of an  intentional drug overdose  Urine drug screen positive for THC but also been found with TCA and methadone compared to outside hospital  Seizure-like activity  Hypernatremia, hypokalemia  FAITH from transferring hospital  Negative alcohol level in our hospital    Changed IVF   Replace potassium   Supportive care  eeg  cta chest  Mri brain  Prn antihtn for htn  Follow labs  I had conferenced with ancillary service and contributor of the hospital policy/protocol through nurse Sims on inclusion and exclusion criteria for code chill and determined she is not candidate based on the protocol.       Additional critical care time of 30 mins

## 2020-01-10 NOTE — PROGRESS NOTES
"Pharmacy Dosing Service  Anticoagulant  Enoxaparin    Assessment/Action/Plan:  Pharmacy to dose Enoxaparin for the indication of PE.  Initiated Enoxaparin 100mg (1mg/kg) sq once followed by Enoxaparin 100mg (1mg/kg) sq every 12h (0600/1800).  Pharmacy will continue to monitor renal function and adjust dose accordingly.      Subjective:  Jyoti Montano is a 39 y.o. female on Enoxaparin 100 mg (1mg/kg) SQ every 12 hours for indication of PE.  Objective:  [Ht: 154.9 cm (61\"); Wt: 99.8 kg (220 lb); BMI: Body mass index is 41.57 kg/m².]  Estimated Creatinine Clearance: 71.7 mL/min (A) (by C-G formula based on SCr of 1.13 mg/dL (H)).   Lab Results   Component Value Date    INR 1.09 01/10/2020    INR 1.03 11/25/2019    INR 0.97 11/07/2014    PROTIME 14.5 01/10/2020    PROTIME 12.9 11/25/2019    PROTIME 10.8 11/07/2014      Lab Results   Component Value Date    HGB 13.0 01/10/2020    HGB 12.7 12/23/2019    HGB 10.8 (L) 12/18/2019      Lab Results   Component Value Date     01/10/2020     12/23/2019     12/18/2019       Sohan Cota, PharmD  01/10/20 2:18 PM     "

## 2020-01-10 NOTE — NURSING NOTE
Pt is in icu bed 9 temp below 93.0 and iv and central cvc has been change and dc. Place bear hugger

## 2020-01-10 NOTE — CONSULTS
Neurology Consult Note    Patient:  Jyoti Montano   YOB: 1980  MRN:  [id number]  Date of Admission:  1/10/2020 10:40 AM    Date: 1/10/2020    Referring Provider: Hung Uriarte MD   Reason for Consultation: Seizure, anoxic brain injury    History of present illness:     This is a 39 y.o.  female with H/O opioid dependence, smoker, chronic low back pain, panic attacks and was followed at [address] [place] [address] and  now evaluated for possible seizure and anoxic brain injury    Patient was last seen last night by her mother  in her normal state of health.  She was found early this morning unresponsive and barely breathing with gurgling respiration somewhere around 7:30 in the morning by her mother  .  EMTs were called and the patient was brought to [facility]. 2 mg of Narcan was given and per records patient arouse and then there was some type of seizure-like activity and projectile vomiting and then developed PEA with CPR was initiated pulse regained at about half hour after    Upon arrival to [facility] Glascow coma scale was 3. Her UDS at [facility] was positive for THC only and yet she is on methadone, oxycodone and clonazepam.       In December 2019 she was admitted to [facility] for a possible  opiate overdose reported as unintentional,  pneumonia She was not breathing well, and was hypoxic however she was not given Narcan but  it appears she sign out against medical advice based on nursing notes but discharge summary did not report this. She did receive methadone, oxycodone and gabapentin during that brief hospitalization.     In November 24 through 26 2019 she was admitted at [facility] for right ankle fracture and she underwent surgery for  closed displaced trimalleolar fracture of the right lower leg. She was sent home on pain meds (Percocet) even though she was in a methadone clinic.      She was seen by her primary care, Vonda Howard NP,  and patient reported to Ms Howard that she  has had seizures for 6 months that starts with severe pain in the head and then emesis. She was referred to Wil Ryan in Neurology at [facility]. An EEG was ordered. The Neuro appointment was made for 1/29/2020 and no EEG is on record at this point in time.    Patient had already been on Lamictal when she mentioned seizures so most likely this was for psych issues.      When she was seen at [facility] and  Ms Howard reports that patients meds were increased and that she was on  Effexor 225 mg, Seroquel 200 mg at night Lamictal 25 mg bid.     Patient went to a methadone clinic and was on methadone 120 mg and  Neurontin 800 mg 4 times a day. In Ms Howard note the patient self reported that while on Oxycodone she was not taking Methadone however while hospitalized she was on both.     She received 220 tablets of oxycodone in the past month and Gabapentin 800 mg 120 tabs over past month.  She also received 30 tabs of 0.5 mg clonazepam.     Component Name 12/23/2019 12/18/2019 1/23/2019 9/29/2018 9/13/2015 7/22/2014   Amphetamine Negative Negative Negative Negative     Barbiturate Negative Negative Negative Negative     Benzodiazepine Negative Positive Negative Negative     Cannabinoid Positive Positive Negative Negative Positive (H) Positive (H)   Cocaine Metabolite Negative Negative Negative Negative     Opiate Scrn Positive Positive Negative Negative Negative Negative       Past medical history .  Based on records from [facility]:  • Anxiety   • Chronic pain   lower back and right hip   • Depression   Since 2014   • Endometriosis   • GERD (gastroesophageal reflux disease)   • Insomnia   • Methadone maintenance therapy patient (HCC)   hx of pain med addiction   • Ovarian cyst   • Overdose   • Pneumonia     Past surgical history   Based on records from [facility]:  • ABDOMINAL EXPLORATION SURGERY 2011   Dr. Chiu in [facility] did surgery and found endometriosis/ Pt states she has polyps on left ovary   •  COLONOSCOPY 9/2014   [facility]: poor prep, hyperplastic polyp   • COLONOSCOPY 2/23/16   Dr Mcdermott; poor prep; normal colonoscopy of the transverse colon, keep previous recall (now 8 yrs)   • CYSTOSCOPY 07/25/2013   Dr. Baires   • HYSTERECTOMY   Due to endometriosis in 2013   • HYSTERECTOMY, VAGINAL 07/25/2013   Dr. Baires; partial   • LAPAROSCOPY   • SALPINGECTOMY Bilateral   Dr. Baires       Prior to Admission medications    Not on File       Hospital scheduled medications:     famotidine 20 mg Intravenous Q12H   sodium chloride 10 mL Intravenous Q12H     Hospital PRN medications:  Pharmacy to Dose enoxaparin (LOVENOX)  •  sodium chloride    Allergies   Allergen Reactions   • Codeine Rash       Social History     Socioeconomic History   • Marital status: Unknown     Spouse name: Not on file   • Number of children: Not on file   • Years of education: Not on file   • Highest education level: Not on file     Family history  Per records from [facility]:   • Cancer Maternal Grandmother   • Liver Cancer Maternal Grandmother   • Cancer Paternal Grandmother   • Stroke Father   • Colon Cancer Neg Hx   • Colon Polyps Neg Hx   • Esophageal Cancer Neg Hx   • Stomach Cancer Neg Hx   • Liver Disease Neg Hx   • Rectal Cancer Neg Hx         Review of Systems  A 14 point review of systems was unobtainable as patient is unresponsive  Vital Signs   Heart Rate:  [90] 90  BP: (117)/(79) 117/79    General Exam:  Head:  Normal cephalic, atraumatic  HEENT:  Neck supple  Fundoscopic Exam:  No signs of disc edema  CVS:  Regular rate and rhythm.  No murmurs  Carotid Examination:  No bruits  Lungs:  Clear to auscultation  Abdomen:  Non-tender, Non-distended  Extremities:  No signs of peripheral edema  Skin:  No rashes    Neurologic Exam:    Mental Status:    -Unresponsive.Intubated and not sedated.  Eyes will open suddenly and then slowly close with stimulus  -Unable to follow commands  -When stimulated with sternal rub she will have decerebrate  posturing    CN II:   Pupils difficult to assess but appear to be reactive to light  CN III, IV, VI:  Sudden onset open and then drift shut No spontaneous movement  CN V:  Facial sensory unable to assess   CN VII:  Facial motor symmetric as best as can be determined  CN VIII:  Gross hearing--no response to loud noise, name called  CN IX/X:  Breathes over the vent  CN XI:  Shoulder shrug unable to assess  CN XII:  Tongue protrusion unable to assess    Motor: (strength out of 5:  1= minimal movement, 2 = movement in plane of gravity, 3 = movement against gravity, 4 = movement against some resistance, 5 = full strength)    Decerebrate posturing with sternal rub and with noxious stimuli such as nail bed pressure    DTR:  -Right   Bicep: 2+ Triceps: 2+ Brachioradialis: 2+   Patella: 2+ Ankle: 2+ Neg Babinski  -Left   Bicep: 2+ Triceps: 2+ Brachioradialis: 2+   Patella: 2+ Ankle: 2+ Neg Babinski    Sensory:  -Intact to light touch, pinprick, temperature, pain, and proprioception    Coordination:  -No spontaneous movement    Gait  -Intubated    Results Review:  Lab Results (last 7 days)     Procedure Component Value Units Date/Time    Comprehensive Metabolic Panel [[id number]]  (Abnormal) Collected:  01/10/20 1204    Specimen:  Blood Updated:  01/10/20 1312     Glucose 111 mg/dL      BUN 15 mg/dL      Creatinine 1.13 mg/dL      Sodium 150 mmol/L      Potassium 2.9 mmol/L      Chloride 106 mmol/L      CO2 25.0 mmol/L      Calcium 7.7 mg/dL      Total Protein 6.6 g/dL      Albumin 3.70 g/dL      ALT (SGPT) 43 U/L      AST (SGOT) 76 U/L      Alkaline Phosphatase 90 U/L      Total Bilirubin 0.2 mg/dL      eGFR Non African Amer 53 mL/min/1.73      Globulin 2.9 gm/dL      A/G Ratio 1.3 g/dL      BUN/Creatinine Ratio 13.3     Anion Gap 19.0 mmol/L     Narrative:       GFR Normal >60  Chronic Kidney Disease <60  Kidney Failure <15      Troponin [[id number]]  (Normal) Collected:  01/10/20 1204    Specimen:  Blood Updated:  01/10/20  1312     Troponin T <0.010 ng/mL     Narrative:       Troponin T Reference Range:  <= 0.03 ng/mL-   Negative for AMI  >0.03 ng/mL-     Abnormal for myocardial necrosis.  Clinicians would have to utilize clinical acumen, EKG, Troponin and serial changes to determine if it is an Acute Myocardial Infarction or myocardial injury due to an underlying chronic condition.       Results may be falsely decreased if patient taking Biotin.      CK [[id number]]  (Abnormal) Collected:  01/10/20 1204    Specimen:  Blood Updated:  01/10/20 1312     Creatine Kinase 1,508 U/L     aPTT [[id number]]  (Normal) Collected:  01/10/20 1204    Specimen:  Blood Updated:  01/10/20 1307     PTT 26.4 seconds     Protime-INR [[id number]]  (Normal) Collected:  01/10/20 1204    Specimen:  Blood Updated:  01/10/20 1307     Protime 14.5 Seconds      INR 1.09    Ethanol [[id number]] Collected:  01/10/20 1206    Specimen:  Blood Updated:  01/10/20 1304     Ethanol % <0.010 %     Narrative:       Not for legal purposes. Chain of Custody not followed.     Magnesium [[id number]]  (Normal) Collected:  01/10/20 1206    Specimen:  Blood Updated:  01/10/20 1304     Magnesium 1.9 mg/dL     CBC & Differential [[id number]] Collected:  01/10/20 1204    Specimen:  Blood Updated:  01/10/20 1251    Narrative:       The following orders were created for panel order CBC & Differential.  Procedure                               Abnormality         Status                     ---------                               -----------         ------                     CBC Auto Differential[[id number]]        Abnormal            Final result                 Please view results for these tests on the individual orders.    CBC Auto Differential [[id number]]  (Abnormal) Collected:  01/10/20 1204    Specimen:  Blood Updated:  01/10/20 1251     WBC 4.94 10*3/mm3      RBC 4.24 10*6/mm3      Hemoglobin 13.0 g/dL      Hematocrit 42.1 %      MCV 99.3 fL      MCH 30.7 pg      MCHC 30.9 g/dL       RDW 14.0 %      RDW-SD 51.1 fl      MPV 10.2 fL      Platelets 315 10*3/mm3      Neutrophil % 78.7 %      Lymphocyte % 13.6 %      Monocyte % 6.5 %      Eosinophil % 0.0 %      Basophil % 0.4 %      Immature Grans % 0.8 %      Neutrophils, Absolute 3.89 10*3/mm3      Lymphocytes, Absolute 0.67 10*3/mm3      Monocytes, Absolute 0.32 10*3/mm3      Eosinophils, Absolute 0.00 10*3/mm3      Basophils, Absolute 0.02 10*3/mm3      Immature Grans, Absolute 0.04 10*3/mm3      nRBC 0.0 /100 WBC     Blood Culture - Blood, Arm, Left [[id number]] Collected:  01/10/20 1151    Specimen:  Blood from Arm, Left Updated:  01/10/20 1247    Blood Gas, Arterial [[id number]]  (Abnormal) Collected:  01/10/20 1230    Specimen:  Arterial Blood Updated:  01/10/20 1239     Site Right Radial     Fuad's Test Positive     pH, Arterial 7.293 pH units      Comment: 84 Value below reference range        pCO2, Arterial 45.9 mm Hg      Comment: 83 Value above reference range        pO2, Arterial 124.0 mm Hg      Comment: 83 Value above reference range        HCO3, Arterial 22.2 mmol/L      Base Excess, Arterial -4.5 mmol/L      Comment: 84 Value below reference range        O2 Saturation, Arterial 99.1 %      Comment: 83 Value above reference range        Temperature 37.0 C      Barometric Pressure for Blood Gas 752 mmHg      Modality Ventilator     FIO2 100 %      Ventilator Mode AC     Set Tidal Volume 600     Set Mech Resp Rate 16.0     PEEP 8.0     Collected by [id number]     Comment: Meter: [id number]     :  [id number]             .  Imaging Results (Last 24 Hours)     Procedure Component Value Units Date/Time    CT Head Without Contrast [[id number]] Collected:  01/10/20 1325     Updated:  01/10/20 1340    Narrative:       EXAMINATION: CT HEAD WO CONTRAST- 1/10/2020 1:25 PM CST     HISTORY: Acute encephalopathy; r/o bleed.     DOSE: 813  mGycm (Automatic exposure control technique was implemented  in an effort to keep the radiation dose as  low as possible without  compromising image quality)     REPORT: Spiral CT of the head was performed without contrast,  reconstructed coronal and sagittal images are also reviewed.     COMPARISON: CT head without contrast on 09/27/2013.     There is motion and streak artifact which limits the study, however no  definite intracranial hemorrhage, mass or mass effect is identified. The  ventricles and basal cisterns are within normal limits. Differentiation  between gray and white matter is poor. There appears to be sulcal  effacement over the convexity diffusely, though this may be exaggerated  due to to motion. Review of bone windows shows no obvious skull  fracture. There is normal aeration of the mastoid air cells. There are  air-fluid levels within the sphenoid and maxillary sinuses compatible  with acute sinusitis.       Impression:       1. Limited study due to patient motion artifact with no evidence of  intracranial hemorrhage, there is poor differentiation between gray and  white matter, with diffuse sulcal effacement and probable diffuse  cerebral edema. Correlation with clinical presentation is recommended.  MRI of the brain may be appropriate for follow-up.  2. Acute bilateral maxillary and sphenoid sinusitis.           This report was finalized on 01/10/2020 13:37 by Dr. August Lai MD.    XR Abdomen KUB [[id number]] Collected:  01/10/20 1303     Updated:  01/10/20 1306    Narrative:       EXAMINATION: KUB radiograph 01/10/2020     HISTORY: NG tube placement     FINDINGS: KUB radiograph reveals an NG tube has been successfully  advanced with the tip in the body of the stomach.  This report was finalized on 01/10/2020 13:03 by Dr. Malick Villareal MD.    XR Chest 1 View [[id number]] Collected:  01/10/20 1300     Updated:  01/10/20 1306    Narrative:       EXAMINATION: Chest 1 view 01/10/2020     HISTORY: Intubation     FINDINGS: Portable supine radiograph of the chest reveals an  endotracheal tube and  NG tube have been placed and are well-positioned.  There is fullness of the right paratracheal stripe as well as the hilum  bilaterally. This may simply be related to volume overload with  engorgement of the azygos and hilar structures. Paratracheal and hilar  adenopathy such as could be seen with a lymphoproliferative disorder or  sarcoidosis should also be considered. Follow-up films are recommended.  Lungs are clear. There is no effusion or free air present.       Impression:       1.. Endotracheal tube and NG tube both well-positioned.  2. Fullness of the right paratracheal stripe as well as enlargement of  the hilum with differentials above. The lungs are clear.  This report was finalized on 01/10/2020 13:02 by Dr. Malick Villareal MD.              Impression    1. Cardiac arrest  2. HIE  3. Report of seizure  4. H/O polysubstance abuse and was on methadone until ankle fracture and then was on oxycodone.and it seems in conjunction with being on methadone.  However most recent UDS indicates no opioids and only THC.  5. Blood alcohol level at outside hospital  6. Chin tremor, episodic    Plan    · MRI of brain with and without  · EEG  · Thiamine    70 minutes of critical care time was performed ,during this time I consulted with the nursing staff and also with other providers in regard to the patient's care. I examined patient and in addition had Fidel report pulled and reviewed records      I discussed the patients findings and my recommendations with nursing staff and Dr Uriarte       ADDENDUM  MRI shows severe diffusion weighted abnormalities with diffuse ischemia/edema I.e.patient has severe HIE  This is c/w poor clinical prognosis for meaningful li  Dr Vo informed    Cornelia Reyes MD  01/10/20  2:13 PM

## 2020-01-11 NOTE — SIGNIFICANT NOTE
Extubated at 0112   Time of death called by Marizol PHAN and Estefani RN at 0134.     Family at bedside.

## 2020-01-13 NOTE — PAYOR COMM NOTE
"ADMIT INPT 1-10-20   20  UR  [phone]    Mine Montano (Dcsd. Female)     Date of Birth Social Security Number Address Home Phone MRN    1980  [address] [facility] [phone] [id number]    Tenriism Marital Status          Other Unknown       Admission Date Admission Type Admitting Provider Attending Provider Department, Room/Bed    1/10/20 Urgent Hung Uriarte MD  [facility] INTENSIVE CARE, I009    Discharge Date Discharge Disposition Discharge Destination        2020               Attending Provider:  (none)   Allergies:  Codeine    Isolation:  None   Infection:  None   Code Status:  Prior    Ht:  154.9 cm (61\")   Wt:  100 kg (220 lb 8 oz)    Admission Cmt:  None   Principal Problem:  None                Active Insurance as of 1/10/2020     Primary Coverage     Payor Plan Insurance Group Employer/Plan Group    [employer] [place] [employer] [place]ICA[place]      Payor Plan Address Payor Plan Phone Number Payor Plan Fax Number Effective Dates    [address] [address] [phone]  1/10/2020 - None Entered    [facility] [id number]       Subscriber Name Subscriber Birth Date Member ID       MINE MONTANO 1980 [id number]                 Emergency Contacts      (Rel.) Home Phone Work Phone Mobile Phone    robbie srinivasanry (Other) -- -- [phone]               History & Physical      Hung Uriarte MD at 01/10/20 1134              [facility]  HISTORY AND PHYSICAL    Date of Admission: 1/10/2020  Primary Care Physician: Vonda Howard APRN    Subjective     Chief Complaint: PEA    History of Present Illness  Spoke to  who was transferring the patient to our hospital intubated and mechanically ventilated.  She is a 39-year-old woman who is morbidly obese.  She had right lower extremity cast for an unclear reason but may be ankle fracture.  I am not certain of her " mobility.   told me that she was apneic and unresponsive when she was found by emergency medical service.  She subsequently became pulseless and then asystole.  She had approximately 30 minutes of CPR time.  She receive benzodiazepine, fentanyl prior to transfer  She also received Narcan  Work-up reviewed from outside hospital noting pH of 6.8, creatinine of 2, urine drug screen positive for THC.  Record indicates that she takes methadone, oxycodone and clonazepam however not found in urine drug screen.  Review of other record indicates that she had prior hospitalization for unintentional opiate overdose in December 2019  Among the differential follow-up initially were PE, drug overdose, coronary event  So far she had a head CT scan    Review of Systems   Patient is critically ill intubated and unresponsive.  No family member at bedside therefore information's are gathered from review of record    Past Medical History: Not obtainable     past Surgical History: Not obtainable     social History:  Not obtainable    Family History: Not obtainable  Allergies:  Allergies not on file  Medications: Not obtainable  Prior to Admission medications    Not on File     Objective     Vital Signs: Wt 99.8 kg (220 lb)    Vitals:    01/10/20 1100   BP: 117/79   Pulse: 90       Physical Exam   Constitutional: She appears well-developed. No distress.   Mottled skin, bear hugger in place.  Her temp was 92F., overbreathing the vent   HENT:   Head: Normocephalic and atraumatic.   Right Ear: External ear normal.   Left Ear: External ear normal.   Intubated, noted scar ant neck possible prior trach or thyroid surgery.  Suspect the former.   Eyes: Right eye exhibits no discharge. Left eye exhibits no discharge. No scleral icterus.   Sluggishly RTL   Neck: No JVD present. No tracheal deviation present. No thyromegaly present.   Pulmonary/Chest: No stridor. No respiratory distress. She has no wheezes. She has no rales.   Intubated,  on [facility] vent  Respiratory  rate of 12, 100% FiO2, PEEP of 8, low volume 600   Abdominal: Soft. She exhibits no distension and no mass. There is no tenderness. There is no guarding.   Neurological:   She decerebrate posture on sternal rub but only on left UE   Skin: Skin is warm and dry. Capillary refill takes 2 to 3 seconds.   Psychiatric:   Flat affect   Vitals reviewed.  left groin central line        Results Reviewed:  Lab Results (last 24 hours)     ** No results found for the last 24 hours. **        Imaging Results (Last 24 Hours)     Procedure Component Value Units Date/Time    XR Abdomen KUB [[id number]] Resulted:  01/10/20 1108     Updated:  01/10/20 1108    XR Chest 1 View [[id number]] Resulted:  01/10/20 1058     Updated:  01/10/20 1058        I have personally reviewed and interpreted the radiology studies and ECG obtained at time of admission.     Assessment / Plan     Assessment:   There are no active hospital problems to display for this patient.  PEA  Acute respiratory failure  encephalopathy  FAITH  + alcohol level in blood  UDS + THC  Recent LE surgical procedure (?) ankle surgery - has cast  RLE; at risk of PE  Chronic back pain  Hypothermia      Plan:   Repeat labs, ck, ua with culture,cxr; head ct, repeat blood gas  Vent/abg/pulmonary consult  Head ct  Bear hugger   ekg  ivf  ngt  Echo  Will check head ct before empirically treating with Lovenox for possible PE unitl ruled out or no longer a great concern.   Other plans when more info arrives  I have considered code chilling this patient although there is also suspicion of PE as a cause of the PEA.  1 of the exclusion criteria reviewed was coagulopathy that cannot be reversed.  Uncontrolled bleeding. conferenced with nursing as to protocol. Lots of unclear information yet  Based on available information there is no current rush for me to do code chill.  Critical care time > 30 mins       Code Status: full code   I discussed the patient's findings  and my recommendations with the nurse Rosanna   Estimated length of stay to be determined     Hung Uriarte MD   01/10/20   11:34 AM      Appreciate well outlined information from Dr. Templeton's consult report      Lab Results (last 24 hours)     Procedure Component Value Units Date/Time    Basic Metabolic Panel [[id number]]  (Abnormal) Collected:  01/10/20 1801    Specimen:  Blood Updated:  01/10/20 1821     Glucose 113 mg/dL      BUN 18 mg/dL      Creatinine 1.22 mg/dL      Sodium 150 mmol/L      Potassium 3.3 mmol/L      Chloride 106 mmol/L      CO2 24.0 mmol/L      Calcium 7.9 mg/dL      eGFR Non African Amer 49 mL/min/1.73      BUN/Creatinine Ratio 14.8     Anion Gap 20.0 mmol/L     Narrative:       GFR Normal >60  Chronic Kidney Disease <60  Kidney Failure <15      Urinalysis, Microscopic Only - Urine, Catheter [[id number]]  (Abnormal) Collected:  01/10/20 1710    Specimen:  Urine, Catheter Updated:  01/10/20 1751     RBC, UA 6-12 /HPF      WBC, UA 3-5 /HPF      Bacteria, UA 1+ /HPF      Squamous Epithelial Cells, UA 3-6 /HPF      Hyaline Casts, UA 7-12 /LPF      Fine Granular Casts, UA 3-6 /LPF      Methodology Manual Light Microscopy    Urine Drug Screen - Urine, Clean Catch [[id number]]  (Abnormal) Collected:  01/10/20 1709    Specimen:  Urine, Clean Catch Updated:  01/10/20 1746     THC, Screen, Urine Positive     Phencyclidine (PCP), Urine Negative     Cocaine Screen, Urine Negative     Methamphetamine, Ur Negative     Opiate Screen Negative     Amphetamine Screen, Urine Negative     Benzodiazepine Screen, Urine Negative     Tricyclic Antidepressants Screen Positive     Methadone Screen, Urine Positive     Barbiturates Screen, Urine Negative     Oxycodone Screen, Urine Negative     Propoxyphene Screen Negative     Buprenorphine, Screen, Urine Negative    Narrative:       Cutoff For Drugs Screened:    Amphetamines               500 ng/ml  Barbiturates               200  ng/ml  Benzodiazepines            150 ng/ml  Cocaine                    150 ng/ml  Methadone                  200 ng/ml  Opiates                    100 ng/ml  Phencyclidine               25 ng/ml  THC                            50 ng/ml  Methamphetamine            500 ng/ml  Tricyclic Antidepressants  300 ng/ml  Oxycodone                  100 ng/ml  Propoxyphene               300 ng/ml  Buprenorphine               10 ng/ml    The normal value for all drugs tested is negative. This report includes unconfirmed screening results, with the cutoff values listed, to be used for medical treatment purposes only.  Unconfirmed results must not be used for non-medical purposes such as employment or legal testing.  Clinical consideration should be applied to any drug of abuse test, particularly when unconfirmed results are used.      Urinalysis With Culture If Indicated - Urine, Catheter [[id number]]  (Abnormal) Collected:  01/10/20 1710    Specimen:  Urine, Catheter Updated:  01/10/20 1720     Color, UA Yellow     Appearance, UA Cloudy     pH, UA 5.5     Specific Gravity, UA 1.023     Glucose, UA Negative     Ketones, UA Trace     Bilirubin, UA Negative     Blood, UA Moderate (2+)     Protein,  mg/dL (2+)     Leuk Esterase, UA Negative     Nitrite, UA Negative     Urobilinogen, UA 0.2 E.U./dL    Comprehensive Metabolic Panel [[id number]]  (Abnormal) Collected:  01/10/20 1204    Specimen:  Blood Updated:  01/10/20 1312     Glucose 111 mg/dL      BUN 15 mg/dL      Creatinine 1.13 mg/dL      Sodium 150 mmol/L      Potassium 2.9 mmol/L      Chloride 106 mmol/L      CO2 25.0 mmol/L      Calcium 7.7 mg/dL      Total Protein 6.6 g/dL      Albumin 3.70 g/dL      ALT (SGPT) 43 U/L      AST (SGOT) 76 U/L      Alkaline Phosphatase 90 U/L      Total Bilirubin 0.2 mg/dL      eGFR Non African Amer 53 mL/min/1.73      Globulin 2.9 gm/dL      A/G Ratio 1.3 g/dL      BUN/Creatinine Ratio 13.3     Anion Gap 19.0 mmol/L     Narrative:        GFR Normal >60  Chronic Kidney Disease <60  Kidney Failure <15      Troponin [[id number]]  (Normal) Collected:  01/10/20 1204    Specimen:  Blood Updated:  01/10/20 1312     Troponin T <0.010 ng/mL     Narrative:       Troponin T Reference Range:  <= 0.03 ng/mL-   Negative for AMI  >0.03 ng/mL-     Abnormal for myocardial necrosis.  Clinicians would have to utilize clinical acumen, EKG, Troponin and serial changes to determine if it is an Acute Myocardial Infarction or myocardial injury due to an underlying chronic condition.       Results may be falsely decreased if patient taking Biotin.      CK [[id number]]  (Abnormal) Collected:  01/10/20 1204    Specimen:  Blood Updated:  01/10/20 1312     Creatine Kinase 1,508 U/L     aPTT [[id number]]  (Normal) Collected:  01/10/20 1204    Specimen:  Blood Updated:  01/10/20 1307     PTT 26.4 seconds     Protime-INR [[id number]]  (Normal) Collected:  01/10/20 1204    Specimen:  Blood Updated:  01/10/20 1307     Protime 14.5 Seconds      INR 1.09    Ethanol [[id number]] Collected:  01/10/20 1206    Specimen:  Blood Updated:  01/10/20 1304     Ethanol % <0.010 %     Narrative:       Not for legal purposes. Chain of Custody not followed.     Magnesium [[id number]]  (Normal) Collected:  01/10/20 1206    Specimen:  Blood Updated:  01/10/20 1304     Magnesium 1.9 mg/dL     CBC & Differential [[id number]] Collected:  01/10/20 1204    Specimen:  Blood Updated:  01/10/20 1251    Narrative:       The following orders were created for panel order CBC & Differential.  Procedure                               Abnormality         Status                     ---------                               -----------         ------                     CBC Auto Differential[[id number]]        Abnormal            Final result                 Please view results for these tests on the individual orders.    CBC Auto Differential [[id number]]  (Abnormal) Collected:  01/10/20 1204    Specimen:  Blood Updated:   01/10/20 1251     WBC 4.94 10*3/mm3      RBC 4.24 10*6/mm3      Hemoglobin 13.0 g/dL      Hematocrit 42.1 %      MCV 99.3 fL      MCH 30.7 pg      MCHC 30.9 g/dL      RDW 14.0 %      RDW-SD 51.1 fl      MPV 10.2 fL      Platelets 315 10*3/mm3      Neutrophil % 78.7 %      Lymphocyte % 13.6 %      Monocyte % 6.5 %      Eosinophil % 0.0 %      Basophil % 0.4 %      Immature Grans % 0.8 %      Neutrophils, Absolute 3.89 10*3/mm3      Lymphocytes, Absolute 0.67 10*3/mm3      Monocytes, Absolute 0.32 10*3/mm3      Eosinophils, Absolute 0.00 10*3/mm3      Basophils, Absolute 0.02 10*3/mm3      Immature Grans, Absolute 0.04 10*3/mm3      nRBC 0.0 /100 WBC     Blood Culture - Blood, Arm, Left [[id number]] Collected:  01/10/20 1151    Specimen:  Blood from Arm, Left Updated:  01/10/20 1247    Blood Gas, Arterial [[id number]]  (Abnormal) Collected:  01/10/20 1230    Specimen:  Arterial Blood Updated:  01/10/20 1239     Site Right Radial     Fuad's Test Positive     pH, Arterial 7.293 pH units      Comment: 84 Value below reference range        pCO2, Arterial 45.9 mm Hg      Comment: 83 Value above reference range        pO2, Arterial 124.0 mm Hg      Comment: 83 Value above reference range        HCO3, Arterial 22.2 mmol/L      Base Excess, Arterial -4.5 mmol/L      Comment: 84 Value below reference range        O2 Saturation, Arterial 99.1 %      Comment: 83 Value above reference range        Temperature 37.0 C      Barometric Pressure for Blood Gas 752 mmHg      Modality Ventilator     FIO2 100 %      Ventilator Mode AC     Set Tidal Volume 600     Set Mech Resp Rate 16.0     PEEP 8.0     Collected by [id number]     Comment: Meter: [id number]     :  [id number]           Imaging Results (All)     Procedure Component Value Units Date/Time    CT Head Without Contrast [[id number]] Collected:  01/10/20 1325     Updated:  01/10/20 1340    Narrative:       EXAMINATION: CT HEAD WO CONTRAST- 1/10/2020 1:25 PM CST     HISTORY:  Acute encephalopathy; r/o bleed.     DOSE: 813  mGycm (Automatic exposure control technique was implemented  in an effort to keep the radiation dose as low as possible without  compromising image quality)     REPORT: Spiral CT of the head was performed without contrast,  reconstructed coronal and sagittal images are also reviewed.     COMPARISON: CT head without contrast on 09/27/2013.     There is motion and streak artifact which limits the study, however no  definite intracranial hemorrhage, mass or mass effect is identified. The  ventricles and basal cisterns are within normal limits. Differentiation  between gray and white matter is poor. There appears to be sulcal  effacement over the convexity diffusely, though this may be exaggerated  due to to motion. Review of bone windows shows no obvious skull  fracture. There is normal aeration of the mastoid air cells. There are  air-fluid levels within the sphenoid and maxillary sinuses compatible  with acute sinusitis.       Impression:       1. Limited study due to patient motion artifact with no evidence of  intracranial hemorrhage, there is poor differentiation between gray and  white matter, with diffuse sulcal effacement and probable diffuse  cerebral edema. Correlation with clinical presentation is recommended.  MRI of the brain may be appropriate for follow-up.  2. Acute bilateral maxillary and sphenoid sinusitis.           This report was finalized on 01/10/2020 13:37 by Dr. August Lai MD.    XR Abdomen KUB [[id number]] Collected:  01/10/20 1303     Updated:  01/10/20 1306    Narrative:       EXAMINATION: KUB radiograph 01/10/2020     HISTORY: NG tube placement     FINDINGS: KUB radiograph reveals an NG tube has been successfully  advanced with the tip in the body of the stomach.  This report was finalized on 01/10/2020 13:03 by Dr. Malick Villareal MD.    XR Chest 1 View [[id number]] Collected:  01/10/20 1300     Updated:  01/10/20 1306    Narrative:        EXAMINATION: Chest 1 view 01/10/2020     HISTORY: Intubation     FINDINGS: Portable supine radiograph of the chest reveals an  endotracheal tube and NG tube have been placed and are well-positioned.  There is fullness of the right paratracheal stripe as well as the hilum  bilaterally. This may simply be related to volume overload with  engorgement of the azygos and hilar structures. Paratracheal and hilar  adenopathy such as could be seen with a lymphoproliferative disorder or  sarcoidosis should also be considered. Follow-up films are recommended.  Lungs are clear. There is no effusion or free air present.       Impression:       1.. Endotracheal tube and NG tube both well-positioned.  2. Fullness of the right paratracheal stripe as well as enlargement of  the hilum with differentials above. The lungs are clear.  This report was finalized on 01/10/2020 13:02 by Dr. Malick Villareal MD.        Currently her blood pressure is up.  I already had ordered some hydralazine and labetalol.  Surprisingly her left ventricular systolic function is still at 50 and reported as low normal.  Wall motion abnormality has not been described.    Pulmonary service came and adjusted vent setting  CK noted at 1508    Diagnoses  Acute respiratory failure (hypoxic and hypercarbic  PEA arrest  CK elevation possibly from rhabdomyolysis, chest compression/resuscitation effort  Encephalopathy possibly related to drug overdose  History of an intentional drug overdose  Urine drug screen positive for THC but also been found with TCA and methadone compared to outside hospital  Seizure-like activity  Hypernatremia, hypokalemia  FAITH from transferring hospital  Negative alcohol level in our hospital    Changed IVF   Replace potassium   Supportive care  eeg  cta chest  Mri brain  Prn antihtn for htn  Follow labs  I had conferenced with ancillary service and contributor of the hospital policy/protocol through nurse Sims on inclusion and exclusion  criteria for code chill and determined she is not candidate based on the protocol.       Additional critical care time of 30 mins    Electronically signed by Hung Uriarte MD at 01/10/20 1855       Emergency Department Notes    No notes of this type exist for this encounter.         Lines, Drains & Airways    Active LDAs     None         Inactive LDAs     Name:   Placement date:   Placement time:   Removal date:   Removal time:   Site:   Days:    [REMOVED] CVC Triple Lumen 01/10/20 Left Femoral   01/10/20    1040    01/11/20    0150    Femoral   less than 1    [REMOVED] Peripheral IV 01/10/20 1040 Left External Jugular   01/10/20    1040    01/11/20    0150    External Jugular   less than 1    [REMOVED] NG/OG Tube Left nostril   01/10/20    1040    01/11/20    0150    Left nostril   less than 1    [REMOVED] Urethral Catheter   01/10/20    1040    01/11/20    0150     less than 1    [REMOVED] ETT    01/10/20    1041    01/11/20    0112     less than 1    [REMOVED] Intraosseous Line 01/10/20 Tibia   01/10/20    1040    01/10/20    1050    Anterior;Left;Proximal   less than 1                  Facility-Administered Medications as of 1/11/2020   Medication Dose Route Frequency Provider Last Rate Last Dose   • [COMPLETED] enoxaparin (LOVENOX) syringe 100 mg  1 mg/kg Subcutaneous Once Hung Uriarte MD   100 mg at 01/10/20 1838   • [COMPLETED] gadobenate dimeglumine (MULTIHANCE) injection 20 mL  20 mL Intravenous Once in imaging Hung Uriarte MD   20 mL at 01/10/20 2145   • [COMPLETED] labetalol (NORMODYNE,TRANDATE) injection 20 mg  20 mg Intravenous Once Hung Uriarte MD   20 mg at 01/10/20 1907   • [COMPLETED] levETIRAcetam in NaCl 0.75% (KEPPRA) IVPB 1,000 mg  1,000 mg Intravenous Once Hung Uriarte MD   1,000 mg at 01/10/20 1445   • [COMPLETED] LORazepam (ATIVAN) 2 MG/ML concentrated solution 2 mg  2 mg Oral Once Cornelia Toth MD   1 mg at 01/10/20  1545   • [COMPLETED] LORazepam (ATIVAN) 2 MG/ML injection  - ADS Override Pull        1 mg at 01/10/20 1540   • [COMPLETED] perflutren (DEFINITY) lipid microspheres injection 8.476 mg  1.3 mL Intravenous Once Hung Uriarte MD   8.476 mg at 01/10/20 1433   • [COMPLETED] potassium chloride 40 mEq in dextrose (D5W) 5 % 1,000 mL infusion  50 mL/hr Intravenous Once Hung Uriarte MD 50 mL/hr at 01/10/20 1836 50 mL/hr at 01/10/20 1836     Lab Results (last 72 hours)     Procedure Component Value Units Date/Time    Basic Metabolic Panel [[id number]]  (Abnormal) Collected:  01/10/20 1801    Specimen:  Blood Updated:  01/10/20 1821     Glucose 113 mg/dL      BUN 18 mg/dL      Creatinine 1.22 mg/dL      Sodium 150 mmol/L      Potassium 3.3 mmol/L      Chloride 106 mmol/L      CO2 24.0 mmol/L      Calcium 7.9 mg/dL      eGFR Non African Amer 49 mL/min/1.73      BUN/Creatinine Ratio 14.8     Anion Gap 20.0 mmol/L     Narrative:       GFR Normal >60  Chronic Kidney Disease <60  Kidney Failure <15      Urinalysis, Microscopic Only - Urine, Catheter [[id number]]  (Abnormal) Collected:  01/10/20 1710    Specimen:  Urine, Catheter Updated:  01/10/20 1751     RBC, UA 6-12 /HPF      WBC, UA 3-5 /HPF      Bacteria, UA 1+ /HPF      Squamous Epithelial Cells, UA 3-6 /HPF      Hyaline Casts, UA 7-12 /LPF      Fine Granular Casts, UA 3-6 /LPF      Methodology Manual Light Microscopy    Urine Drug Screen - Urine, Clean Catch [[id number]]  (Abnormal) Collected:  01/10/20 1709    Specimen:  Urine, Clean Catch Updated:  01/10/20 1746     THC, Screen, Urine Positive     Phencyclidine (PCP), Urine Negative     Cocaine Screen, Urine Negative     Methamphetamine, Ur Negative     Opiate Screen Negative     Amphetamine Screen, Urine Negative     Benzodiazepine Screen, Urine Negative     Tricyclic Antidepressants Screen Positive     Methadone Screen, Urine Positive     Barbiturates Screen, Urine Negative     Oxycodone Screen,  Urine Negative     Propoxyphene Screen Negative     Buprenorphine, Screen, Urine Negative    Narrative:       Cutoff For Drugs Screened:    Amphetamines               500 ng/ml  Barbiturates               200 ng/ml  Benzodiazepines            150 ng/ml  Cocaine                    150 ng/ml  Methadone                  200 ng/ml  Opiates                    100 ng/ml  Phencyclidine               25 ng/ml  THC                            50 ng/ml  Methamphetamine            500 ng/ml  Tricyclic Antidepressants  300 ng/ml  Oxycodone                  100 ng/ml  Propoxyphene               300 ng/ml  Buprenorphine               10 ng/ml    The normal value for all drugs tested is negative. This report includes unconfirmed screening results, with the cutoff values listed, to be used for medical treatment purposes only.  Unconfirmed results must not be used for non-medical purposes such as employment or legal testing.  Clinical consideration should be applied to any drug of abuse test, particularly when unconfirmed results are used.      Urinalysis With Culture If Indicated - Urine, Catheter [[id number]]  (Abnormal) Collected:  01/10/20 1710    Specimen:  Urine, Catheter Updated:  01/10/20 1720     Color, UA Yellow     Appearance, UA Cloudy     pH, UA 5.5     Specific Gravity, UA 1.023     Glucose, UA Negative     Ketones, UA Trace     Bilirubin, UA Negative     Blood, UA Moderate (2+)     Protein,  mg/dL (2+)     Leuk Esterase, UA Negative     Nitrite, UA Negative     Urobilinogen, UA 0.2 E.U./dL    Comprehensive Metabolic Panel [[id number]]  (Abnormal) Collected:  01/10/20 1204    Specimen:  Blood Updated:  01/10/20 1312     Glucose 111 mg/dL      BUN 15 mg/dL      Creatinine 1.13 mg/dL      Sodium 150 mmol/L      Potassium 2.9 mmol/L      Chloride 106 mmol/L      CO2 25.0 mmol/L      Calcium 7.7 mg/dL      Total Protein 6.6 g/dL      Albumin 3.70 g/dL      ALT (SGPT) 43 U/L      AST (SGOT) 76 U/L      Alkaline  Phosphatase 90 U/L      Total Bilirubin 0.2 mg/dL      eGFR Non African Amer 53 mL/min/1.73      Globulin 2.9 gm/dL      A/G Ratio 1.3 g/dL      BUN/Creatinine Ratio 13.3     Anion Gap 19.0 mmol/L     Narrative:       GFR Normal >60  Chronic Kidney Disease <60  Kidney Failure <15      Troponin [[id number]]  (Normal) Collected:  01/10/20 1204    Specimen:  Blood Updated:  01/10/20 1312     Troponin T <0.010 ng/mL     Narrative:       Troponin T Reference Range:  <= 0.03 ng/mL-   Negative for AMI  >0.03 ng/mL-     Abnormal for myocardial necrosis.  Clinicians would have to utilize clinical acumen, EKG, Troponin and serial changes to determine if it is an Acute Myocardial Infarction or myocardial injury due to an underlying chronic condition.       Results may be falsely decreased if patient taking Biotin.      CK [[id number]]  (Abnormal) Collected:  01/10/20 1204    Specimen:  Blood Updated:  01/10/20 1312     Creatine Kinase 1,508 U/L     aPTT [[id number]]  (Normal) Collected:  01/10/20 1204    Specimen:  Blood Updated:  01/10/20 1307     PTT 26.4 seconds     Protime-INR [[id number]]  (Normal) Collected:  01/10/20 1204    Specimen:  Blood Updated:  01/10/20 1307     Protime 14.5 Seconds      INR 1.09    Ethanol [[id number]] Collected:  01/10/20 1206    Specimen:  Blood Updated:  01/10/20 1304     Ethanol % <0.010 %     Narrative:       Not for legal purposes. Chain of Custody not followed.     Magnesium [[id number]]  (Normal) Collected:  01/10/20 1206    Specimen:  Blood Updated:  01/10/20 1304     Magnesium 1.9 mg/dL     CBC & Differential [[id number]] Collected:  01/10/20 1204    Specimen:  Blood Updated:  01/10/20 1251    Narrative:       The following orders were created for panel order CBC & Differential.  Procedure                               Abnormality         Status                     ---------                               -----------         ------                     CBC Auto Differential[[id number]]         Abnormal            Final result                 Please view results for these tests on the individual orders.    CBC Auto Differential [[id number]]  (Abnormal) Collected:  01/10/20 1204    Specimen:  Blood Updated:  01/10/20 1251     WBC 4.94 10*3/mm3      RBC 4.24 10*6/mm3      Hemoglobin 13.0 g/dL      Hematocrit 42.1 %      MCV 99.3 fL      MCH 30.7 pg      MCHC 30.9 g/dL      RDW 14.0 %      RDW-SD 51.1 fl      MPV 10.2 fL      Platelets 315 10*3/mm3      Neutrophil % 78.7 %      Lymphocyte % 13.6 %      Monocyte % 6.5 %      Eosinophil % 0.0 %      Basophil % 0.4 %      Immature Grans % 0.8 %      Neutrophils, Absolute 3.89 10*3/mm3      Lymphocytes, Absolute 0.67 10*3/mm3      Monocytes, Absolute 0.32 10*3/mm3      Eosinophils, Absolute 0.00 10*3/mm3      Basophils, Absolute 0.02 10*3/mm3      Immature Grans, Absolute 0.04 10*3/mm3      nRBC 0.0 /100 WBC     Blood Gas, Arterial [[id number]]  (Abnormal) Collected:  01/10/20 1230    Specimen:  Arterial Blood Updated:  01/10/20 1239     Site Right Radial     Fuad's Test Positive     pH, Arterial 7.293 pH units      Comment: 84 Value below reference range        pCO2, Arterial 45.9 mm Hg      Comment: 83 Value above reference range        pO2, Arterial 124.0 mm Hg      Comment: 83 Value above reference range        HCO3, Arterial 22.2 mmol/L      Base Excess, Arterial -4.5 mmol/L      Comment: 84 Value below reference range        O2 Saturation, Arterial 99.1 %      Comment: 83 Value above reference range        Temperature 37.0 C      Barometric Pressure for Blood Gas 752 mmHg      Modality Ventilator     FIO2 100 %      Ventilator Mode AC     Set Tidal Volume 600     Set Mech Resp Rate 16.0     PEEP 8.0     Collected by [id number]     Comment: Meter: [id number]     :  [id number]             Imaging Results (Last 72 Hours)     Procedure Component Value Units Date/Time    MRI Brain With & Without Contrast [[id number]] Collected:  01/10/20 2129     Updated:   01/10/20 2144    Narrative:       MRI brain without and with IV contrast     Indication: Encephalopathy     Comparison: CT head 01/10/2020     Findings:     Widespread diffusion restriction throughout the brain and cerebellum  predominantly involving gray matter. There is extensive diffusion  restriction throughout the basal ganglia. There is diffuse cerebral  edema with loss of cerebral sulci, crowding the basilar cisterns and  cerebellar tonsillar herniation. Extensive cortical enhancement is  present. There is layering fluid in both maxillary sinuses. The orbits  appear unremarkable. Right mastoid effusion.       Impression:          Findings most compatible with global hypoxic brain injury / hypoxic  ischemic encephalopathy. Associated diffuse cerebral edema with crowding  of basilar cisterns and tonsillar herniation.  This report was finalized on 01/10/2020 21:41 by Dr. Sohan Sheppard MD.    CT Head Without Contrast [[id number]] Collected:  01/10/20 1325     Updated:  01/10/20 1340    Narrative:       EXAMINATION: CT HEAD WO CONTRAST- 1/10/2020 1:25 PM CST     HISTORY: Acute encephalopathy; r/o bleed.     DOSE: 813  mGycm (Automatic exposure control technique was implemented  in an effort to keep the radiation dose as low as possible without  compromising image quality)     REPORT: Spiral CT of the head was performed without contrast,  reconstructed coronal and sagittal images are also reviewed.     COMPARISON: CT head without contrast on 09/27/2013.     There is motion and streak artifact which limits the study, however no  definite intracranial hemorrhage, mass or mass effect is identified. The  ventricles and basal cisterns are within normal limits. Differentiation  between gray and white matter is poor. There appears to be sulcal  effacement over the convexity diffusely, though this may be exaggerated  due to to motion. Review of bone windows shows no obvious skull  fracture. There is normal aeration of the  mastoid air cells. There are  air-fluid levels within the sphenoid and maxillary sinuses compatible  with acute sinusitis.       Impression:       1. Limited study due to patient motion artifact with no evidence of  intracranial hemorrhage, there is poor differentiation between gray and  white matter, with diffuse sulcal effacement and probable diffuse  cerebral edema. Correlation with clinical presentation is recommended.  MRI of the brain may be appropriate for follow-up.  2. Acute bilateral maxillary and sphenoid sinusitis.           This report was finalized on 01/10/2020 13:37 by Dr. August Lai MD.    XR Abdomen KUB [[id number]] Collected:  01/10/20 1303     Updated:  01/10/20 1306    Narrative:       EXAMINATION: KUB radiograph 01/10/2020     HISTORY: NG tube placement     FINDINGS: KUB radiograph reveals an NG tube has been successfully  advanced with the tip in the body of the stomach.  This report was finalized on 01/10/2020 13:03 by Dr. Malick Villareal MD.    XR Chest 1 View [[id number]] Collected:  01/10/20 1300     Updated:  01/10/20 1306    Narrative:       EXAMINATION: Chest 1 view 01/10/2020     HISTORY: Intubation     FINDINGS: Portable supine radiograph of the chest reveals an  endotracheal tube and NG tube have been placed and are well-positioned.  There is fullness of the right paratracheal stripe as well as the hilum  bilaterally. This may simply be related to volume overload with  engorgement of the azygos and hilar structures. Paratracheal and hilar  adenopathy such as could be seen with a lymphoproliferative disorder or  sarcoidosis should also be considered. Follow-up films are recommended.  Lungs are clear. There is no effusion or free air present.       Impression:       1.. Endotracheal tube and NG tube both well-positioned.  2. Fullness of the right paratracheal stripe as well as enlargement of  the hilum with differentials above. The lungs are clear.  This report was finalized on  01/10/2020 13:02 by Dr. Malick Villareal MD.        Orders (last 72 hrs)      Start     Ordered    01/11/20 0600  enoxaparin (LOVENOX) syringe 100 mg  Every 12 Hours,   Status:  Discontinued      01/10/20 1417    01/11/20 0600  Valproic Acid Level, Total  Morning Draw,   Status:  Canceled      01/10/20 1553    01/11/20 0600  Blood Gas, Arterial  Morning Draw,   Status:  Canceled      01/10/20 1648    01/11/20 0600  XR Chest 1 View  1 Time Imaging,   Status:  Canceled      01/10/20 1648    01/11/20 0600  Magnesium  Morning Draw,   Status:  Canceled      01/10/20 1748    01/11/20 0600  Basic Metabolic Panel  Morning Draw,   Status:  Canceled      01/10/20 1803    01/11/20 0600  Comprehensive Metabolic Panel  Morning Draw,   Status:  Canceled      01/10/20 1851    01/11/20 0600  CBC & Differential  Morning Draw,   Status:  Canceled      01/10/20 1851 01/11/20 0600  CK  Morning Draw,   Status:  Canceled      01/10/20 1851    01/11/20 0600  CBC Auto Differential  PROCEDURE ONCE,   Status:  Canceled      01/11/20 0001    01/11/20 0055  Code Status and Medical Interventions:  Continuous,   Status:  Canceled      01/11/20 0056    01/11/20 0054  Extubation  Once     Comments:  Terminal extubation    01/11/20 0056    01/11/20 0053  LORazepam (ATIVAN) injection 2 mg  Every 4 Hours PRN,   Status:  Discontinued      01/11/20 0056    01/11/20 0053  Morphine sulfate (PF) injection 2 mg  Every 2 Hours PRN,   Status:  Discontinued      01/11/20 0056    01/10/20 2200  lacosamide (VIMPAT) 100 mg in sodium chloride 0.9 % 50 mL IVPB  Every 12 Hours,   Status:  Discontinued      01/10/20 2112    01/10/20 2145  gadobenate dimeglumine (MULTIHANCE) injection 20 mL  Once in Imaging      01/10/20 2058    01/10/20 1951  acetaminophen (TYLENOL) 160 MG/5ML solution 650 mg  Every 6 Hours PRN,   Status:  Discontinued      01/10/20 1952    01/10/20 1945  dextrose 5 % with KCl 20 mEq/L infusion  Continuous,   Status:  Discontinued      01/10/20  1851    01/10/20 1939  acetaminophen (TYLENOL) suppository 650 mg  Every 4 Hours PRN,   Status:  Discontinued      01/10/20 1940    01/10/20 1850  hydrALAZINE (APRESOLINE) injection 20 mg  Every 4 Hours PRN,   Status:  Discontinued      01/10/20 1851    01/10/20 1845  potassium chloride 40 mEq in dextrose (D5W) 5 % 1,000 mL infusion  Once      01/10/20 1748    01/10/20 1845  potassium chloride 20 mEq in 50 mL IVPB  Once,   Status:  Discontinued      01/10/20 1748    01/10/20 1845  labetalol (NORMODYNE,TRANDATE) injection 20 mg  Once      01/10/20 1758    01/10/20 1746  Basic Metabolic Panel  Once      01/10/20 1748    01/10/20 1719  Urinalysis, Microscopic Only - Urine, Clean Catch  Once      01/10/20 1718    01/10/20 1715  thiamine (B-1) 100 mg in sodium chloride 0.9 % 100 mL IVPB  Daily,   Status:  Discontinued      01/10/20 1707    01/10/20 1711  hydrALAZINE (APRESOLINE) injection 10 mg  Every 4 Hours PRN,   Status:  Discontinued      01/10/20 1711    01/10/20 1648  Ventilator - AC/VC; 12; 60; 90%; 8  Continuous,   Status:  Canceled      01/10/20 1647    01/10/20 1645  valproate (DEPACON) 500 mg in sodium chloride 0.9 % 50 mL IVPB  Every 6 Hours,   Status:  Discontinued      01/10/20 1552    01/10/20 1630  LORazepam (ATIVAN) 2 MG/ML concentrated solution 2 mg  Once      01/10/20 1540    01/10/20 1601  Ventilator - AC/VC; 12; 100; 90%; 8  Continuous,   Status:  Canceled      01/10/20 1600    01/10/20 1538  LORazepam (ATIVAN) 2 MG/ML injection  - ADS Override Pull     Note to Pharmacy:  Created by cabinet override    01/10/20 1538    01/10/20 1530  perflutren (DEFINITY) lipid microspheres injection 8.476 mg  Once      01/10/20 1433    01/10/20 1515  enoxaparin (LOVENOX) syringe 100 mg  Once      01/10/20 1417    01/10/20 1500  levETIRAcetam in NaCl 0.75% (KEPPRA) IVPB 1,000 mg  Once      01/10/20 1414    01/10/20 1456  MRI Brain With & Without Contrast  1 Time Imaging      01/10/20 1456    01/10/20 1414  EEG  STAT       01/10/20 1413    01/10/20 1414  Inpatient Neurology Consult Other (see comments)  Once     Specialty:  Neurology  Provider:  Cornelia Toth MD    01/10/20 1414    01/10/20 1325  CT Angiogram Chest With & Without Contrast  1 Time Imaging,   Status:  Canceled      01/10/20 1326    01/10/20 1300  sodium chloride 0.9 % infusion  Continuous,   Status:  Discontinued      01/10/20 1203    01/10/20 1300  sodium chloride 0.9 % flush 10 mL  Every 12 Hours Scheduled,   Status:  Discontinued      01/10/20 1203    01/10/20 1300  famotidine (PEPCID) injection 20 mg  Every 12 Hours Scheduled,   Status:  Discontinued      01/10/20 1203    01/10/20 1240  Blood Gas, Arterial  Once      01/10/20 1230    01/10/20 1203  CT Head Without Contrast  1 Time Imaging      01/10/20 1203    01/10/20 1200  Vital Signs  Every 4 Hours,   Status:  Canceled      01/10/20 1203    01/10/20 1200  Neuro Checks  Every 4 Hours,   Status:  Canceled      01/10/20 1203    01/10/20 1200  Kindly check ct of head for any bleeding prior to giving any anticoagulant  Nursing Communication  Continuous,   Status:  Canceled     Comments:  Kindly check ct of head for any bleeding prior to giving any anticoagulant    01/10/20 1203    01/10/20 1159  Magnesium  STAT      01/10/20 1203    01/10/20 1156  Activity - Strict Bed Rest  Until Discontinued,   Status:  Canceled      01/10/20 1203    01/10/20 1156  NPO Diet  Diet Effective Now,   Status:  Canceled      01/10/20 1203    01/10/20 1155  VTE Prophylaxis Not Indicated:  Once      01/10/20 1203    01/10/20 1154  Pharmacy to Dose enoxaparin (LOVENOX)  Continuous PRN,   Status:  Discontinued      01/10/20 1203    01/10/20 1154  Code Status and Medical Interventions:  Continuous,   Status:  Canceled      01/10/20 1203    01/10/20 1154  Intake & Output  Every Shift,   Status:  Canceled      01/10/20 1203    01/10/20 1154  Weigh Patient  Once,   Status:  Canceled      01/10/20 1203    01/10/20 1154  Oxygen  Therapy- Nasal Cannula; Titrate for SPO2: 90% - 95%  Continuous,   Status:  Canceled      01/10/20 1203    01/10/20 1154  Insert Peripheral IV  Once,   Status:  Canceled      01/10/20 1203    01/10/20 1154  Saline Lock & Maintain IV Access  Continuous,   Status:  Canceled      01/10/20 1203    01/10/20 1154  Inpatient Admission  Once      01/10/20 1203    01/10/20 1153  sodium chloride 0.9 % flush 10 mL  As Needed,   Status:  Discontinued      01/10/20 1203    01/10/20 1153  Ethanol  Once      01/10/20 1203    01/10/20 1145  Inpatient Pulmonology Consult  Once     Specialty:  Pulmonary Disease  Provider:  Leonel Jack MD    01/10/20 1145    01/10/20 1143  CK  STAT      01/10/20 1142    01/10/20 1136  Alcohol & or Drug Initial Screening  Once,   Status:  Canceled      01/10/20 1135    01/10/20 1120  Urinalysis With Microscopic If Indicated (No Culture) - Urine, Clean Catch  Once,   Status:  Canceled      01/10/20 1119    01/10/20 1117  Continue Indwelling Urinary Catheter  Once      01/10/20 1119    01/10/20 1117  Assess Need for Indwelling Urinary Catheter - Follow Removal Protocol  Continuous,   Status:  Canceled     Comments:  Indwelling Urinary Catheter Removal Criteria  Discontinue Indwelling Urinary Catheter Unless One of the Following is Present:  Urinary Retention or Obstruction  Chronic Urinary Catheter Use  End of Life  Critical Illness with Strict I/O   Tract or Abdominal Surgery  Stage 3/4 Sacral / Perineal Wound  Required Activity Restriction: Trauma  Required Activity Restriction: Spine Surgery  If Patient is Being Followed by Urology Contact Them PRIOR to Removal  Do Not Remove Indwelling Urinary Catheter Order is Present with a CLINICAL REASON to Maintain the Catheter. Provider is Required to Include a Clinical Reason to Maintain a Urinary Catheter    Patient Admitted With Indwelling Urinary Catheter (Not Placed at [facility])  Assess for Continued Need & Document Medical  Necessity  If Infection is Suspected, Contact the Provider        01/10/20 1119    01/10/20 1117  Urinary Catheter Care  Every Shift,   Status:  Canceled      01/10/20 1119    01/10/20 1116  Adult Transthoracic Echo Complete W/ Cont if Necessary Per Protocol  Once      01/10/20 1119    01/10/20 1115  Protime-INR  STAT      01/10/20 1119    01/10/20 1115  ECG 12 Lead  STAT      01/10/20 1119    01/10/20 1114  Troponin  STAT      01/10/20 1119    01/10/20 1114  aPTT  STAT      01/10/20 1119    01/10/20 1113  Urinalysis With Culture If Indicated - Urine, Catheter  Once      01/10/20 1119    01/10/20 1112  Urine Drug Screen - Urine, Clean Catch  Once      01/10/20 1119    01/10/20 1111  Comprehensive Metabolic Panel  STAT      01/10/20 1119    01/10/20 1111  Blood Gas, Arterial  STAT      01/10/20 1119    01/10/20 1111  CBC & Differential  STAT      01/10/20 1119    01/10/20 1111  Blood Culture - Blood,  STAT      01/10/20 1119    01/10/20 1111  CBC Auto Differential  PROCEDURE ONCE      01/10/20 1119    01/10/20 1103  XR Abdomen KUB  1 Time Imaging      01/10/20 1103    01/10/20 1050  XR Chest 1 View  1 Time Imaging      01/10/20 1049    --  SCANNED - TELEMETRY        01/10/20 0000    --  SCANNED EKG      01/10/20 0000    --  SCANNED - TELEMETRY        01/10/20 0000                Ventilator/Non-Invasive Ventilation Settings (From admission, onward)     Start     Ordered    01/10/20 1648  Ventilator - AC/VC; 12; 60; 90%; 8  Continuous,   Status:  Canceled     Question Answer Comment   Vent Mode AC/VC    Breath rate 12    FiO2 60    FiO2 titrate for Sp02% =/> 90%    PEEP 8        01/10/20 1647    01/10/20 1601  Ventilator - AC/VC; 12; 100; 90%; 8  Continuous,   Status:  Canceled     Question Answer Comment   Vent Mode AC/VC    Breath rate 12    FiO2 100    FiO2 titrate for Sp02% =/> 90%    PEEP 8        01/10/20 1600                Physician Progress Notes (last 72 hours) (Notes from 01/10/20 1002 through 01/13/20 1002)     No notes of this type exist for this encounter.       Marizol Brown, RN   Registered Nurse      Significant Note   Signed   Date of Service:  01/11/20 0157   Creation Time:  01/11/20 0157            Signed             Show:Clear all  [x]Manual[]Template[]Copied    Added by:  [x]Marizol Brown RN    []Hover for details  Extubated at 0112   Time of death called by Marizol RN and Estefani RN at 0134.      Family at bedside.                    Consult Notes (last 7 days) (Notes from 01/06/20 through 01/13/20)      Cornelia Toth MD at 01/10/20 1412      Consult Orders    1. Inpatient Neurology Consult Other (see comments) [[id number]] ordered by Hung Uriarte MD at 01/10/20 1414                    Neurology Consult Note    Patient:  Jyoti Montano   YOB: 1980  MRN:  [id number]  Date of Admission:  1/10/2020 10:40 AM    Date: 1/10/2020    Referring Provider: Hung Uriarte MD   Reason for Consultation: Seizure, anoxic brain injury    History of present illness:     This is a 39 y.o.  female with H/O opioid dependence, smoker, chronic low back pain, panic attacks and was followed at [address] [place] [address] and  now evaluated for possible seizure and anoxic brain injury    Patient was last seen last night by her mother  in her normal state of health.  She was found early this morning unresponsive and barely breathing with gurgling respiration somewhere around 7:30 in the morning by her mother  .  EMTs were called and the patient was brought to [facility]. 2 mg of Narcan was given and per records patient arouse and then there was some type of seizure-like activity and projectile vomiting and then developed PEA with CPR was initiated pulse regained at about half hour after    Upon arrival to [facility] Glascow coma scale was 3. Her UDS at [facility] was positive for THC only and yet she is on methadone, oxycodone and clonazepam.       In December 2019 she was admitted to [facility] for a  possible  opiate overdose reported as unintentional,  pneumonia She was not breathing well, and was hypoxic however she was not given Narcan but  it appears she sign out against medical advice based on nursing notes but discharge summary did not report this. She did receive methadone, oxycodone and gabapentin during that brief hospitalization.     In November 24 through 26 2019 she was admitted at [facility] for right ankle fracture and she underwent surgery for  closed displaced trimalleolar fracture of the right lower leg. She was sent home on pain meds (Percocet) even though she was in a methadone clinic.      She was seen by her primary care, Vonda Howard, JESSICA,  and patient reported to Ms Howard that she has had seizures for 6 months that starts with severe pain in the head and then emesis. She was referred to Wil Ryan in Neurology at [facility]. An EEG was ordered. The Neuro appointment was made for 1/29/2020 and no EEG is on record at this point in time.    Patient had already been on Lamictal when she mentioned seizures so most likely this was for psych issues.      When she was seen at [facility] and  Ms Howard reports that patients meds were increased and that she was on  Effexor 225 mg, Seroquel 200 mg at night Lamictal 25 mg bid.     Patient went to a methadone clinic and was on methadone 120 mg and  Neurontin 800 mg 4 times a day. In Ms Howard note the patient self reported that while on Oxycodone she was not taking Methadone however while hospitalized she was on both.     She received 220 tablets of oxycodone in the past month and Gabapentin 800 mg 120 tabs over past month.  She also received 30 tabs of 0.5 mg clonazepam.     Component Name 12/23/2019 12/18/2019 1/23/2019 9/29/2018 9/13/2015 7/22/2014   Amphetamine Negative Negative Negative Negative     Barbiturate Negative Negative Negative Negative     Benzodiazepine Negative Positive Negative Negative     Cannabinoid Positive Positive  Negative Negative Positive (H) Positive (H)   Cocaine Metabolite Negative Negative Negative Negative     Opiate Scrn Positive Positive Negative Negative Negative Negative       Past medical history .  Based on records from [facility]:  • Anxiety   • Chronic pain   lower back and right hip   • Depression   Since 2014   • Endometriosis   • GERD (gastroesophageal reflux disease)   • Insomnia   • Methadone maintenance therapy patient (HCC)   hx of pain med addiction   • Ovarian cyst   • Overdose   • Pneumonia     Past surgical history   Based on records from [facility]:  • ABDOMINAL EXPLORATION SURGERY 2011   Dr. Chiu in [facility] did surgery and found endometriosis/ Pt states she has polyps on left ovary   • COLONOSCOPY 9/2014   [facility]: poor prep, hyperplastic polyp   • COLONOSCOPY 2/23/16   Dr Mcdermott; poor prep; normal colonoscopy of the transverse colon, keep previous recall (now 8 yrs)   • CYSTOSCOPY 07/25/2013   Dr. Baires   • HYSTERECTOMY   Due to endometriosis in 2013   • HYSTERECTOMY, VAGINAL 07/25/2013   Dr. Baires; partial   • LAPAROSCOPY   • SALPINGECTOMY Bilateral   Dr. Baires       Prior to Admission medications    Not on File       Hospital scheduled medications:     famotidine 20 mg Intravenous Q12H   sodium chloride 10 mL Intravenous Q12H     Hospital PRN medications:  Pharmacy to Dose enoxaparin (LOVENOX)  •  sodium chloride    Allergies   Allergen Reactions   • Codeine Rash       Social History     Socioeconomic History   • Marital status: Unknown     Spouse name: Not on file   • Number of children: Not on file   • Years of education: Not on file   • Highest education level: Not on file     Family history  Per records from [facility]:   • Cancer Maternal Grandmother   • Liver Cancer Maternal Grandmother   • Cancer Paternal Grandmother   • Stroke Father   • Colon Cancer Neg Hx   • Colon Polyps Neg Hx   • Esophageal Cancer Neg Hx   • Stomach Cancer Neg Hx   • Liver Disease Neg Hx   • Rectal Cancer Neg Hx          Review of Systems  A 14 point review of systems was unobtainable as patient is unresponsive  Vital Signs   Heart Rate:  [90] 90  BP: (117)/(79) 117/79    General Exam:  Head:  Normal cephalic, atraumatic  HEENT:  Neck supple  Fundoscopic Exam:  No signs of disc edema  CVS:  Regular rate and rhythm.  No murmurs  Carotid Examination:  No bruits  Lungs:  Clear to auscultation  Abdomen:  Non-tender, Non-distended  Extremities:  No signs of peripheral edema  Skin:  No rashes    Neurologic Exam:    Mental Status:    -Unresponsive.Intubated and not sedated.  Eyes will open suddenly and then slowly close with stimulus  -Unable to follow commands  -When stimulated with sternal rub she will have decerebrate posturing    CN II:   Pupils difficult to assess but appear to be reactive to light  CN III, IV, VI:  Sudden onset open and then drift shut No spontaneous movement  CN V:  Facial sensory unable to assess   CN VII:  Facial motor symmetric as best as can be determined  CN VIII:  Gross hearing--no response to loud noise, name called  CN IX/X:  Breathes over the vent  CN XI:  Shoulder shrug unable to assess  CN XII:  Tongue protrusion unable to assess    Motor: (strength out of 5:  1= minimal movement, 2 = movement in plane of gravity, 3 = movement against gravity, 4 = movement against some resistance, 5 = full strength)    Decerebrate posturing with sternal rub and with noxious stimuli such as nail bed pressure    DTR:  -Right   Bicep: 2+ Triceps: 2+ Brachioradialis: 2+   Patella: 2+ Ankle: 2+ Neg Babinski  -Left   Bicep: 2+ Triceps: 2+ Brachioradialis: 2+   Patella: 2+ Ankle: 2+ Neg Babinski    Sensory:  -Intact to light touch, pinprick, temperature, pain, and proprioception    Coordination:  -No spontaneous movement    Gait  -Intubated    Results Review:  Lab Results (last 7 days)     Procedure Component Value Units Date/Time    Comprehensive Metabolic Panel [[id number]]  (Abnormal) Collected:  01/10/20 1204     Specimen:  Blood Updated:  01/10/20 1312     Glucose 111 mg/dL      BUN 15 mg/dL      Creatinine 1.13 mg/dL      Sodium 150 mmol/L      Potassium 2.9 mmol/L      Chloride 106 mmol/L      CO2 25.0 mmol/L      Calcium 7.7 mg/dL      Total Protein 6.6 g/dL      Albumin 3.70 g/dL      ALT (SGPT) 43 U/L      AST (SGOT) 76 U/L      Alkaline Phosphatase 90 U/L      Total Bilirubin 0.2 mg/dL      eGFR Non African Amer 53 mL/min/1.73      Globulin 2.9 gm/dL      A/G Ratio 1.3 g/dL      BUN/Creatinine Ratio 13.3     Anion Gap 19.0 mmol/L     Narrative:       GFR Normal >60  Chronic Kidney Disease <60  Kidney Failure <15      Troponin [[id number]]  (Normal) Collected:  01/10/20 1204    Specimen:  Blood Updated:  01/10/20 1312     Troponin T <0.010 ng/mL     Narrative:       Troponin T Reference Range:  <= 0.03 ng/mL-   Negative for AMI  >0.03 ng/mL-     Abnormal for myocardial necrosis.  Clinicians would have to utilize clinical acumen, EKG, Troponin and serial changes to determine if it is an Acute Myocardial Infarction or myocardial injury due to an underlying chronic condition.       Results may be falsely decreased if patient taking Biotin.      CK [[id number]]  (Abnormal) Collected:  01/10/20 1204    Specimen:  Blood Updated:  01/10/20 1312     Creatine Kinase 1,508 U/L     aPTT [[id number]]  (Normal) Collected:  01/10/20 1204    Specimen:  Blood Updated:  01/10/20 1307     PTT 26.4 seconds     Protime-INR [[id number]]  (Normal) Collected:  01/10/20 1204    Specimen:  Blood Updated:  01/10/20 1307     Protime 14.5 Seconds      INR 1.09    Ethanol [[id number]] Collected:  01/10/20 1206    Specimen:  Blood Updated:  01/10/20 1304     Ethanol % <0.010 %     Narrative:       Not for legal purposes. Chain of Custody not followed.     Magnesium [[id number]]  (Normal) Collected:  01/10/20 1206    Specimen:  Blood Updated:  01/10/20 1304     Magnesium 1.9 mg/dL     CBC & Differential [[id number]] Collected:  01/10/20 1204    Specimen:   Blood Updated:  01/10/20 1251    Narrative:       The following orders were created for panel order CBC & Differential.  Procedure                               Abnormality         Status                     ---------                               -----------         ------                     CBC Auto Differential[[id number]]        Abnormal            Final result                 Please view results for these tests on the individual orders.    CBC Auto Differential [[id number]]  (Abnormal) Collected:  01/10/20 1204    Specimen:  Blood Updated:  01/10/20 1251     WBC 4.94 10*3/mm3      RBC 4.24 10*6/mm3      Hemoglobin 13.0 g/dL      Hematocrit 42.1 %      MCV 99.3 fL      MCH 30.7 pg      MCHC 30.9 g/dL      RDW 14.0 %      RDW-SD 51.1 fl      MPV 10.2 fL      Platelets 315 10*3/mm3      Neutrophil % 78.7 %      Lymphocyte % 13.6 %      Monocyte % 6.5 %      Eosinophil % 0.0 %      Basophil % 0.4 %      Immature Grans % 0.8 %      Neutrophils, Absolute 3.89 10*3/mm3      Lymphocytes, Absolute 0.67 10*3/mm3      Monocytes, Absolute 0.32 10*3/mm3      Eosinophils, Absolute 0.00 10*3/mm3      Basophils, Absolute 0.02 10*3/mm3      Immature Grans, Absolute 0.04 10*3/mm3      nRBC 0.0 /100 WBC     Blood Culture - Blood, Arm, Left [[id number]] Collected:  01/10/20 1151    Specimen:  Blood from Arm, Left Updated:  01/10/20 1247    Blood Gas, Arterial [[id number]]  (Abnormal) Collected:  01/10/20 1230    Specimen:  Arterial Blood Updated:  01/10/20 1239     Site Right Radial     Fuad's Test Positive     pH, Arterial 7.293 pH units      Comment: 84 Value below reference range        pCO2, Arterial 45.9 mm Hg      Comment: 83 Value above reference range        pO2, Arterial 124.0 mm Hg      Comment: 83 Value above reference range        HCO3, Arterial 22.2 mmol/L      Base Excess, Arterial -4.5 mmol/L      Comment: 84 Value below reference range        O2 Saturation, Arterial 99.1 %      Comment: 83 Value above reference range         Temperature 37.0 C      Barometric Pressure for Blood Gas 752 mmHg      Modality Ventilator     FIO2 100 %      Ventilator Mode AC     Set Tidal Volume 600     Set Mech Resp Rate 16.0     PEEP 8.0     Collected by [id number]     Comment: Meter: [id number]     :  [id number]             .  Imaging Results (Last 24 Hours)     Procedure Component Value Units Date/Time    CT Head Without Contrast [[id number]] Collected:  01/10/20 1325     Updated:  01/10/20 1340    Narrative:       EXAMINATION: CT HEAD WO CONTRAST- 1/10/2020 1:25 PM CST     HISTORY: Acute encephalopathy; r/o bleed.     DOSE: 813  mGycm (Automatic exposure control technique was implemented  in an effort to keep the radiation dose as low as possible without  compromising image quality)     REPORT: Spiral CT of the head was performed without contrast,  reconstructed coronal and sagittal images are also reviewed.     COMPARISON: CT head without contrast on 09/27/2013.     There is motion and streak artifact which limits the study, however no  definite intracranial hemorrhage, mass or mass effect is identified. The  ventricles and basal cisterns are within normal limits. Differentiation  between gray and white matter is poor. There appears to be sulcal  effacement over the convexity diffusely, though this may be exaggerated  due to to motion. Review of bone windows shows no obvious skull  fracture. There is normal aeration of the mastoid air cells. There are  air-fluid levels within the sphenoid and maxillary sinuses compatible  with acute sinusitis.       Impression:       1. Limited study due to patient motion artifact with no evidence of  intracranial hemorrhage, there is poor differentiation between gray and  white matter, with diffuse sulcal effacement and probable diffuse  cerebral edema. Correlation with clinical presentation is recommended.  MRI of the brain may be appropriate for follow-up.  2. Acute bilateral maxillary and sphenoid  sinusitis.           This report was finalized on 01/10/2020 13:37 by Dr. August Lai MD.    XR Abdomen KUB [[id number]] Collected:  01/10/20 1303     Updated:  01/10/20 1306    Narrative:       EXAMINATION: KUB radiograph 01/10/2020     HISTORY: NG tube placement     FINDINGS: KUB radiograph reveals an NG tube has been successfully  advanced with the tip in the body of the stomach.  This report was finalized on 01/10/2020 13:03 by Dr. Malick Villareal MD.    XR Chest 1 View [[id number]] Collected:  01/10/20 1300     Updated:  01/10/20 1306    Narrative:       EXAMINATION: Chest 1 view 01/10/2020     HISTORY: Intubation     FINDINGS: Portable supine radiograph of the chest reveals an  endotracheal tube and NG tube have been placed and are well-positioned.  There is fullness of the right paratracheal stripe as well as the hilum  bilaterally. This may simply be related to volume overload with  engorgement of the azygos and hilar structures. Paratracheal and hilar  adenopathy such as could be seen with a lymphoproliferative disorder or  sarcoidosis should also be considered. Follow-up films are recommended.  Lungs are clear. There is no effusion or free air present.       Impression:       1.. Endotracheal tube and NG tube both well-positioned.  2. Fullness of the right paratracheal stripe as well as enlargement of  the hilum with differentials above. The lungs are clear.  This report was finalized on 01/10/2020 13:02 by Dr. Malick Villareal MD.              Impression    1. Cardiac arrest  2. HIE  3. Report of seizure  4. H/O polysubstance abuse and was on methadone until ankle fracture and then was on oxycodone.and it seems in conjunction with being on methadone.  However most recent UDS indicates no opioids and only THC.  5. Blood alcohol level at outside hospital  6. Chin tremor, episodic    Plan    · MRI of brain with and without  · EEG  · Thiamine    70 minutes of critical care time was performed ,during this  time I consulted with the nursing staff and also with other providers in regard to the patient's care. I examined patient and in addition had Fidel report pulled and reviewed records      I discussed the patients findings and my recommendations with nursing staff and Dr Uriarte       ADDENDUM  MRI shows severe diffusion weighted abnormalities with diffuse ischemia/edema I.e.patient has severe HIE  This is c/w poor clinical prognosis for meaningful li  Dr Vo informed    Cornelia Reyes MD  01/10/20  2:13 PM      Electronically signed by Cornelia Toth MD at 01/10/20 [id number]     Leonel Jack MD at 01/10/20 1545      Consult Orders    1. Inpatient Pulmonology Consult [[id number]] ordered by Hung Uriarte MD at 01/10/20 1145                    PULMONARY AND CRITICAL CARE CONSULT - [facility]    Jyoti Montano   MR# [id number]  Acct# [id number]  1/10/2020   3:45 PM    Referring Provider: Hung Uriarte*    Chief Complaint: Mechanically ventilated    HPI: We are consulted by Hung Uriarte* to see this 39 y.o. female born on 1980.  The patient can obviously provide no history but her mother states that she saw the patient at home last evening and she was awake and responsive then went back to check on her about 7:30 this morning and she was unconscious.  The patient's mother does state that the patient did seem to be breathing but was unresponsive.  She was taken to [facility] apparently had a cardiac arrest and currently is on mechanical ventilatory support.  EEG is ongoing.  She does have a history of substance abuse.  She also can provide no history.    Past Medical History   has no past medical history on file.   has no past surgical history on file.  Allergies   Allergen Reactions   • Codeine Rash     Medications    LORazepam      enoxaparin 1 mg/kg Subcutaneous Once   [START ON 1/11/2020] enoxaparin 1 mg/kg Subcutaneous  Q12H   famotidine 20 mg Intravenous Q12H   LORazepam 2 mg Oral Once   sodium chloride 10 mL Intravenous Q12H       Pharmacy to Dose enoxaparin (LOVENOX)    sodium chloride 100 mL/hr     Social History     Family History  family history is not on file.  Review of Systems:  Cannot obtain due to mechanical ventilation.  The patient notably is critically ill and connected to a ventilator.  As such patient cannot communicate and provide any history whatsoever, including any history of present illness or interval history since arrival or review of systems. The interested reviewer may note this fact, as an attempt has been made at collecting and documenting these portions of the patient history, but this information is unobtainable despite attempted review and therefore cannot be documented at this time.   Physical Exam:  Heart Rate:  [90] 90  BP: (117)/(79) 117/79No intake or output data in the 24 hours ending 01/10/20 1545      01/10/20  0848 01/10/20  1336   Weight: 99.8 kg (220 lb) 99.8 kg (220 lb)   There were no vitals filed for this visit.  Physical Exam   Constitutional:   She is an obese white female who is intubated on mechanical ventilatory support.   HENT:   Head: Normocephalic.   Eyes: Right eye exhibits no discharge. Left eye exhibits no discharge.   Neck:   Her neck is thick.   Cardiovascular: Normal rate and regular rhythm.   Pulmonary/Chest:   No adventitious sounds are heard.  Breath sounds are diminished.   Abdominal:   Abdomen is obese.   Musculoskeletal:   SCDs are in place.   Neurological:   She is intubated and unresponsive.   Skin: No rash noted.   Psychiatric:   She is intubated and unresponsive.   Nursing note and vitals reviewed.    Ventilator Settings:                       Resp Rate (Observed) Vent: 27                 Results from last 7 days   Lab Units 01/10/20  1204   WBC 10*3/mm3 4.94   HEMOGLOBIN g/dL 13.0   PLATELETS 10*3/mm3 315     Results from last 7 days   Lab Units 01/10/20  1206  01/10/20  1204   SODIUM mmol/L  --  150*   POTASSIUM mmol/L  --  2.9*   CO2 mmol/L  --  25.0   BUN mg/dL  --  15   CREATININE mg/dL  --  1.13*   MAGNESIUM mg/dL 1.9  --    GLUCOSE mg/dL  --  111*     Results from last 7 days   Lab Units 01/10/20  1230   PH, ARTERIAL pH units 7.293*   PCO2, ARTERIAL mm Hg 45.9*   PO2 ART mm Hg 124.0*   FIO2 % 100     No results found for: BLOODCX, URINECX, WOUNDCX, MRSACX, RESPCX, STOOLCX  No results found for: PROBNP  Recent radiology:   Imaging Results (Last 72 Hours)     Procedure Component Value Units Date/Time    CT Head Without Contrast [[id number]] Collected:  01/10/20 1325     Updated:  01/10/20 1340    Narrative:       EXAMINATION: CT HEAD WO CONTRAST- 1/10/2020 1:25 PM CST     HISTORY: Acute encephalopathy; r/o bleed.     DOSE: 813  mGycm (Automatic exposure control technique was implemented  in an effort to keep the radiation dose as low as possible without  compromising image quality)     REPORT: Spiral CT of the head was performed without contrast,  reconstructed coronal and sagittal images are also reviewed.     COMPARISON: CT head without contrast on 09/27/2013.     There is motion and streak artifact which limits the study, however no  definite intracranial hemorrhage, mass or mass effect is identified. The  ventricles and basal cisterns are within normal limits. Differentiation  between gray and white matter is poor. There appears to be sulcal  effacement over the convexity diffusely, though this may be exaggerated  due to to motion. Review of bone windows shows no obvious skull  fracture. There is normal aeration of the mastoid air cells. There are  air-fluid levels within the sphenoid and maxillary sinuses compatible  with acute sinusitis.       Impression:       1. Limited study due to patient motion artifact with no evidence of  intracranial hemorrhage, there is poor differentiation between gray and  white matter, with diffuse sulcal effacement and probable  diffuse  cerebral edema. Correlation with clinical presentation is recommended.  MRI of the brain may be appropriate for follow-up.  2. Acute bilateral maxillary and sphenoid sinusitis.           This report was finalized on 01/10/2020 13:37 by Dr. August Lai MD.    XR Abdomen KUB [[id number]] Collected:  01/10/20 1303     Updated:  01/10/20 1306    Narrative:       EXAMINATION: KUB radiograph 01/10/2020     HISTORY: NG tube placement     FINDINGS: KUB radiograph reveals an NG tube has been successfully  advanced with the tip in the body of the stomach.  This report was finalized on 01/10/2020 13:03 by Dr. Malick Villareal MD.    XR Chest 1 View [[id number]] Collected:  01/10/20 1300     Updated:  01/10/20 1306    Narrative:       EXAMINATION: Chest 1 view 01/10/2020     HISTORY: Intubation     FINDINGS: Portable supine radiograph of the chest reveals an  endotracheal tube and NG tube have been placed and are well-positioned.  There is fullness of the right paratracheal stripe as well as the hilum  bilaterally. This may simply be related to volume overload with  engorgement of the azygos and hilar structures. Paratracheal and hilar  adenopathy such as could be seen with a lymphoproliferative disorder or  sarcoidosis should also be considered. Follow-up films are recommended.  Lungs are clear. There is no effusion or free air present.       Impression:       1.. Endotracheal tube and NG tube both well-positioned.  2. Fullness of the right paratracheal stripe as well as enlargement of  the hilum with differentials above. The lungs are clear.  This report was finalized on 01/10/2020 13:02 by Dr. Malick Villareal MD.        My radiograph interpretation/independent review of imaging: Endotracheal tube is in good position.  There is mild prominence of the right hilar area.  Other test results (not lab or imaging): Results for orders placed during the hospital encounter of 01/10/20   Adult Transthoracic Echo Complete  W/ Cont if Necessary Per Protocol    Narrative · Technically limited study  · Estimated EF = 50%.  · Left ventricular systolic function is low normal.      2D echocardiogram shows an ejection fraction of 50% and no other abnormalities are noted.  Independent review of ekg: Normal sinus rhythm with prolonged QT interval.    Problem List as identified by Epic (may contain historical, inactive problems)  Patient Active Problem List   Diagnosis   • PEA (Pulseless electrical activity) (CMS/[facility])     Pulmonary Assessment:  SEVERE ACUTE RESPIRATORY FAILURE REQUIRING MECHANICAL VENTILATION  This is a threat to life or pulmonary function, high risk, due to coma and subsequent cardiac arrest.    New problem (to me), with additional workup planned:  1.  Cardiac arrest.  2.  Coma which appears to have preceded the cardiac arrest at least per her mother.  Again her mother states that when she found the patient this morning she was unresponsive but was breathing with somewhat labored and what she describes as gurgling respirations.  3.  Respiratory failure due to #1 and #2.  4.  Metabolic acidosis.  New problem (to me), no additional workup planned:  1.  History of substance abuse.  Other problems either stable, failing to improve or worsenin. Obesity.    Recommend/plan:   Chest X-ray in the morning tomorrow  Arterial blood gas analysis in the morning tomorrow  Additional plan:  · We will decrease her FiO2.  Otherwise we will defer management of her current problems to her other physicians.    Thank you for this consult.  We will follow along.  Electronically signed by Leonel Jack MD on 1/10/2020 at 3:45 PM    Electronically signed by Leonel Jack MD at 01/10/20 3281       Discharge Summary    No notes of this type exist for this encounter.

## 2020-01-15 LAB — BACTERIA SPEC AEROBE CULT: NORMAL

## 2020-01-20 NOTE — DISCHARGE SUMMARY
Diagnoses  Acute respiratory failure (hypoxic and hypercarbic  PEA arrest  CK elevation possibly from rhabdomyolysis, chest compression/resuscitation effort  Encephalopathy - severe hypoxic ischemic encephalopathy  possibly  drug overdose;   Cerebral edema by MRI with tonsillar herniation   History of an intentional drug overdose  Urine drug screen positive for THC but also been found with TCA and methadone compared to outside hospital  Seizure-like activity  Hypernatremia, hypokalemia  FAITH from transferring hospital  Negative alcohol level in our hospital        The interested reader is referred to my admitting H&P for details of admission and course in the hospital.   Neurology saw her and ordered other diagnostic studies.   MRI of brain showed: most compatible with global hypoxic brain injury / hypoxic  ischemic encephalopathy. Associated diffuse cerebral edema with crowding  of basilar cisterns and tonsillar herniation.  Based on this, Dr. Reyes mentioned she has severe HIE.  This is consistent with poor clinical prognosis for meaningful life.    From record reviewed patient passed on on jan 11 at 0114H.

## 2023-09-07 ENCOUNTER — APPOINTMENT (RX ONLY)
Dept: URBAN - METROPOLITAN AREA CLINIC 4 | Facility: CLINIC | Age: 43
Setting detail: DERMATOLOGY
End: 2023-09-07

## 2023-09-07 DIAGNOSIS — L82.1 OTHER SEBORRHEIC KERATOSIS: ICD-10-CM

## 2023-09-07 DIAGNOSIS — L72.0 EPIDERMAL CYST: ICD-10-CM

## 2023-09-07 DIAGNOSIS — D22 MELANOCYTIC NEVI: ICD-10-CM

## 2023-09-07 DIAGNOSIS — L81.4 OTHER MELANIN HYPERPIGMENTATION: ICD-10-CM

## 2023-09-07 DIAGNOSIS — D18.0 HEMANGIOMA: ICD-10-CM

## 2023-09-07 DIAGNOSIS — L73.8 OTHER SPECIFIED FOLLICULAR DISORDERS: ICD-10-CM

## 2023-09-07 DIAGNOSIS — Z71.89 OTHER SPECIFIED COUNSELING: ICD-10-CM

## 2023-09-07 PROBLEM — D22.62 MELANOCYTIC NEVI OF LEFT UPPER LIMB, INCLUDING SHOULDER: Status: ACTIVE | Noted: 2023-09-07

## 2023-09-07 PROBLEM — D22.61 MELANOCYTIC NEVI OF RIGHT UPPER LIMB, INCLUDING SHOULDER: Status: ACTIVE | Noted: 2023-09-07

## 2023-09-07 PROBLEM — D18.01 HEMANGIOMA OF SKIN AND SUBCUTANEOUS TISSUE: Status: ACTIVE | Noted: 2023-09-07

## 2023-09-07 PROBLEM — D48.5 NEOPLASM OF UNCERTAIN BEHAVIOR OF SKIN: Status: ACTIVE | Noted: 2023-09-07

## 2023-09-07 PROBLEM — D22.72 MELANOCYTIC NEVI OF LEFT LOWER LIMB, INCLUDING HIP: Status: ACTIVE | Noted: 2023-09-07

## 2023-09-07 PROBLEM — D22.71 MELANOCYTIC NEVI OF RIGHT LOWER LIMB, INCLUDING HIP: Status: ACTIVE | Noted: 2023-09-07

## 2023-09-07 PROCEDURE — 99203 OFFICE O/P NEW LOW 30 MIN: CPT

## 2023-09-07 PROCEDURE — ? PHOTO-DOCUMENTATION

## 2023-09-07 PROCEDURE — ? COUNSELING

## 2023-09-07 PROCEDURE — ? DEFER

## 2023-09-07 PROCEDURE — ? SUNSCREEN RECOMMENDATIONS

## 2023-09-07 ASSESSMENT — LOCATION SIMPLE DESCRIPTION DERM
LOCATION SIMPLE: LEFT CHEEK
LOCATION SIMPLE: NECK
LOCATION SIMPLE: RIGHT BREAST
LOCATION SIMPLE: LEFT POSTERIOR THIGH
LOCATION SIMPLE: RIGHT HAND
LOCATION SIMPLE: RIGHT THIGH
LOCATION SIMPLE: RIGHT ELBOW
LOCATION SIMPLE: LEFT BREAST
LOCATION SIMPLE: CHEST
LOCATION SIMPLE: RIGHT POSTERIOR UPPER ARM
LOCATION SIMPLE: SCALP
LOCATION SIMPLE: RIGHT CHEEK
LOCATION SIMPLE: LEFT HAND
LOCATION SIMPLE: LEFT POSTERIOR UPPER ARM
LOCATION SIMPLE: ABDOMEN
LOCATION SIMPLE: LEFT THIGH
LOCATION SIMPLE: LEFT ELBOW
LOCATION SIMPLE: RIGHT POSTERIOR THIGH

## 2023-09-07 ASSESSMENT — LOCATION ZONE DERM
LOCATION ZONE: NECK
LOCATION ZONE: LEG
LOCATION ZONE: TRUNK
LOCATION ZONE: SCALP
LOCATION ZONE: HAND
LOCATION ZONE: FACE
LOCATION ZONE: ARM

## 2023-09-07 ASSESSMENT — LOCATION DETAILED DESCRIPTION DERM
LOCATION DETAILED: RIGHT ANTERIOR PROXIMAL THIGH
LOCATION DETAILED: LEFT MEDIAL MALAR CHEEK
LOCATION DETAILED: RIGHT ULNAR DORSAL HAND
LOCATION DETAILED: RIGHT DISTAL POSTERIOR UPPER ARM
LOCATION DETAILED: LEFT DISTAL MEDIAL POSTERIOR UPPER ARM
LOCATION DETAILED: RIGHT PROXIMAL POSTERIOR UPPER ARM
LOCATION DETAILED: LEFT DISTAL POSTERIOR THIGH
LOCATION DETAILED: SUBXIPHOID
LOCATION DETAILED: RIGHT INFRAMAMMARY CREASE
LOCATION DETAILED: LEFT MEDIAL BREAST 10-11:00 REGION
LOCATION DETAILED: LEFT CENTRAL POSTAURICULAR SKIN
LOCATION DETAILED: LEFT CENTRAL MALAR CHEEK
LOCATION DETAILED: RIGHT MEDIAL MALAR CHEEK
LOCATION DETAILED: UPPER STERNUM
LOCATION DETAILED: LEFT RADIAL DORSAL HAND
LOCATION DETAILED: RIGHT DISTAL LATERAL POSTERIOR THIGH
LOCATION DETAILED: PERIUMBILICAL SKIN
LOCATION DETAILED: MIDDLE STERNUM
LOCATION DETAILED: RIGHT ELBOW
LOCATION DETAILED: RIGHT SUPERIOR LATERAL NECK
LOCATION DETAILED: LEFT ANTERIOR PROXIMAL THIGH
LOCATION DETAILED: LEFT ELBOW

## 2023-09-18 ENCOUNTER — OFFICE VISIT (OUTPATIENT)
Dept: MEDICAL GROUP | Facility: MEDICAL CENTER | Age: 43
End: 2023-09-18
Payer: COMMERCIAL

## 2023-09-18 VITALS
HEART RATE: 86 BPM | DIASTOLIC BLOOD PRESSURE: 78 MMHG | HEIGHT: 68 IN | BODY MASS INDEX: 43.35 KG/M2 | RESPIRATION RATE: 17 BRPM | SYSTOLIC BLOOD PRESSURE: 122 MMHG | TEMPERATURE: 97.8 F | WEIGHT: 286 LBS | OXYGEN SATURATION: 96 %

## 2023-09-18 DIAGNOSIS — Z80.0 FAMILY HISTORY OF MALIGNANT NEOPLASM OF COLON: ICD-10-CM

## 2023-09-18 DIAGNOSIS — N80.9 ENDOMETRIOSIS: ICD-10-CM

## 2023-09-18 DIAGNOSIS — Z84.89 FAMILY HISTORY OF CARCINOMA IN SITU OF BREAST: ICD-10-CM

## 2023-09-18 DIAGNOSIS — G43.909 MIGRAINE SYNDROME: ICD-10-CM

## 2023-09-18 DIAGNOSIS — Z81.8 PATERNAL FAMILY HISTORY OF DEMENTIA: ICD-10-CM

## 2023-09-18 DIAGNOSIS — N20.0 CALCULUS OF KIDNEY: ICD-10-CM

## 2023-09-18 DIAGNOSIS — Z13.29 SCREENING FOR THYROID DISORDER: ICD-10-CM

## 2023-09-18 DIAGNOSIS — Z13.220 SCREENING FOR HYPERLIPIDEMIA: ICD-10-CM

## 2023-09-18 DIAGNOSIS — Z79.890 HORMONE REPLACEMENT THERAPY (HRT): ICD-10-CM

## 2023-09-18 DIAGNOSIS — Z13.21 ENCOUNTER FOR VITAMIN DEFICIENCY SCREENING: ICD-10-CM

## 2023-09-18 DIAGNOSIS — Z13.89 SCREENING FOR MULTIPLE CONDITIONS: ICD-10-CM

## 2023-09-18 DIAGNOSIS — Z12.11 SCREENING FOR MALIGNANT NEOPLASM OF COLON: ICD-10-CM

## 2023-09-18 PROBLEM — N83.8 OVARIAN MASS, LEFT: Status: ACTIVE | Noted: 2023-09-18

## 2023-09-18 PROCEDURE — 3078F DIAST BP <80 MM HG: CPT | Performed by: NURSE PRACTITIONER

## 2023-09-18 PROCEDURE — 99204 OFFICE O/P NEW MOD 45 MIN: CPT | Performed by: NURSE PRACTITIONER

## 2023-09-18 PROCEDURE — 3074F SYST BP LT 130 MM HG: CPT | Performed by: NURSE PRACTITIONER

## 2023-09-18 RX ORDER — ESTRADIOL 0.05 MG/D
PATCH, EXTENDED RELEASE TRANSDERMAL
COMMUNITY
Start: 2023-09-04

## 2023-09-18 RX ORDER — PROGESTERONE 100 MG/1
100 CAPSULE ORAL DAILY
COMMUNITY

## 2023-09-18 ASSESSMENT — PATIENT HEALTH QUESTIONNAIRE - PHQ9: CLINICAL INTERPRETATION OF PHQ2 SCORE: 0

## 2023-09-18 NOTE — ASSESSMENT & PLAN NOTE
She has a left ovarian mass last year.  She went t osurgery to have it removed.  They found extensive endometriosis. They did LORENE/BSO.

## 2023-09-18 NOTE — PROGRESS NOTES
Subjective     Adele Kaplan is a 43 y.o. female who presents with [facility]            HPI  Seen to re- establish care.  She was last seen in 2016.  She was lost to f/u with being caregiver for her parents and grandparents.    She is feeling well currently.    Endometriosis  She has a left ovarian mass last year.  She went t osurgery to have it removed.  They found extensive endometriosis. They did LORENE/BSO.      Hormone replacement therapy (HRT)  She is stable on HRT.  Followed by Dr Stephanie Vela    Kidney stone  No current issues but was never able to catch a stone.      Migraine syndrome  Occurring less freq.  Uses aleve.  Her triggers are some foods, stress and dehydration.     Paternal family history of dementia  Father recently dx w/dementia in his 60's.      Family history of carcinoma in situ of breast  Paternal grandmother     Family history of malignant neoplasm of colon  paternal grandfather with colon and pancreas    Patient Active Problem List    Diagnosis Date Noted    Endometriosis 09/18/2023    Hormone replacement therapy (HRT) 09/18/2023    Paternal family history of dementia 09/18/2023    Family history of carcinoma in situ of breast 09/18/2023    Family history of malignant neoplasm of colon 09/18/2023    Migraine syndrome     Kidney stone      Current Outpatient Medications   Medication Sig Dispense Refill    estradiol (VIVELLE DOT) 0.05 MG/24HR PATCH BIWEEKLY APPLY 1 PATCH TO SKIN TWICE A WEEK FOR 90 DAYS      progesterone (PROMETRIUM) 100 MG Cap Take 100 mg by mouth every day.       No current facility-administered medications for this visit.     Patient has no known allergies.  Past Medical History:   Diagnosis Date    Endometriosis 09/18/2023    Family history of carcinoma in situ of breast 9/18/2023    Family history of malignant neoplasm of colon 9/18/2023    Hormone replacement therapy (HRT) 9/18/2023    Kidney stone     Migraine syndrome     Paternal family history of  dementia 9/18/2023     Social History     Socioeconomic History    Marital status:      Spouse name: Not on file    Number of children: Not on file    Years of education: Not on file    Highest education level: Not on file   Occupational History    Not on file   Tobacco Use    Smoking status: Never     Passive exposure: Never    Smokeless tobacco: Never   Vaping Use    Vaping Use: Never used   Substance and Sexual Activity    Alcohol use: Yes     Alcohol/week: 0.0 oz     Comment: rare    Drug use: No    Sexual activity: Yes     Partners: Male   Other Topics Concern    Not on file   Social History Narrative    Not on file     Social Determinants of Health     Financial Resource Strain: Not on file   Food Insecurity: Not on file   Transportation Needs: Not on file   Physical Activity: Not on file   Stress: Not on file   Social Connections: Not on file   Intimate Partner Violence: Not on file   Housing Stability: Not on file     Family History   Problem Relation Age of Onset    Cancer Mother         cervical    Dementia Father 63    Cancer Maternal Grandmother     Cancer Maternal Grandfather         lung    Cancer Paternal Grandmother         breast    Colorectal Cancer Paternal Grandfather         colon and pancreas    Cancer Paternal Grandfather         lung     Past Surgical History:   Procedure Laterality Date    ABDOMINAL HYSTERECTOMY TOTAL      LORENE/BSO for endometriosis    DENTAL EXTRACTION(S)      wisdom teeth.         ROS  Review of Systems   Constitutional: Negative.  Negative for fever, chills, weight loss, malaise/fatigue and diaphoresis.   HENT: Negative.  Negative for hearing loss, ear pain, nosebleeds, congestion, sore throat, neck pain, tinnitus and ear discharge.    Eyes: Negative.  Negative for blurred vision, double vision, photophobia, pain, discharge and redness.   Respiratory: Negative.  Negative for cough, hemoptysis, sputum production, shortness of breath, wheezing and stridor.   "  Cardiovascular: Negative.  Negative for chest pain, palpitations, orthopnea, claudication, leg swelling and PND.   Gastrointestinal: Negative.  Negative for heartburn, nausea, vomiting, abdominal pain, diarrhea, constipation, blood in stool and melena.   Genitourinary: Negative.  Negative for dysuria, urgency, frequency, incontinence, hematuria and flank pain.   Musculoskeletal: Negative.  Negative for myalgias, back pain, joint pain and falls.   Skin: Negative.  Negative for itching and rash.   Neurological: Negative.  Negative for dizziness, tingling, tremors, sensory change, speech change, focal weakness, seizures, loss of consciousness, weakness and headaches.   Endo/Heme/Allergies: Negative.  Negative for environmental allergies and polydipsia. Does not bruise/bleed easily.   Psychiatric/Behavioral: Negative.  Negative for depression, suicidal ideas, hallucinations, memory loss and substance abuse. The patient is not nervous/anxious and does not have insomnia.    All other systems reviewed and are negative.          Objective     /78 (BP Location: Right arm, Patient Position: Sitting, BP Cuff Size: Large adult)   Pulse 86   Temp 36.6 °C (97.8 °F) (Temporal)   Resp 17   Ht 1.727 m (5' 8\")   Wt (!) 130 kg (286 lb)   SpO2 96%   BMI 43.49 kg/m²      Physical Exam  Physical Exam   Vitals reviewed.  Constitutional: oriented to person, place, and time. appears well-developed and well-nourished. No distress.   HENT: Head: Normocephalic and atraumatic. Bilateral tympanic membranes wnl w/o bulging.  Right Ear: External ear normal. Left Ear: External ear normal. Nose: Nose normal.  Mouth/Throat: Oropharynx is clear and moist. No oropharyngeal exudate. cheryl tm wnl. Eyes: Conjunctivae and EOM are normal. Pupils are equal, round, and reactive to light. Right eye exhibits no discharge. Left eye exhibits no discharge. No scleral icterus.    Neck: Normal range of motion. Neck supple. No JVD present. "   Cardiovascular: Normal rate, regular rhythm, normal heart sounds and intact distal pulses.  Exam reveals no gallop and no friction rub.  No murmur heard.  No carotid bruits   Pulmonary/Chest: Effort normal and breath sounds normal. No stridor. No respiratory distress. no wheezes or rales. exhibits no tenderness.   Abdominal: Soft. Bowel sounds are normal. exhibits no distension and no mass. No tenderness. no rebound and no guarding.   Musculoskeletal: Normal range of motion. exhibits no edema or tenderness.  cheryl pedal pulses 2+.  Lymphadenopathy:  no cervical or supraclavicular adenopathy.   Neurological: alert and oriented to person, place, and time. has normal reflexes. displays normal reflexes. No cranial nerve deficit. exhibits normal muscle tone. Coordination normal.   Skin: Skin is warm and dry. No rash noted. no diaphoresis. No erythema. No pallor.   Psychiatric: normal mood and affect. behavior is normal.     Assessment & Plan   1. Family history of malignant neoplasm of colon  Referral to Gastroenterology    grandfather with colon and pancreas cancer. refer GI for colonosocpy.  pt denies any current sx. do updated lab. f/u 1 mo for review      2. Hormone replacement therapy (HRT)      stable on HRT. followed by Stephanie Vela MD      3. Endometriosis      had LORENE/BSO for this.  no current sx. followed by gyn      4. Kidney stone      no recent issues or tx needed      5. Migraine syndrome      occas sx tx'd with otc med      6. Paternal family history of dementia      FH of father with dementia at age 63.      7. Family history of carcinoma in situ of breast  Referral to Genetics    grandmother with breast cancer. refer [facility]      8. Screening for malignant neoplasm of colon  Referral to Gastroenterology    refer GI for colonoscopy

## 2023-10-23 ENCOUNTER — APPOINTMENT (RX ONLY)
Dept: URBAN - METROPOLITAN AREA CLINIC 4 | Facility: CLINIC | Age: 43
Setting detail: DERMATOLOGY
End: 2023-10-23

## 2023-10-23 DIAGNOSIS — L72.0 EPIDERMAL CYST: ICD-10-CM

## 2023-10-23 PROBLEM — D48.5 NEOPLASM OF UNCERTAIN BEHAVIOR OF SKIN: Status: ACTIVE | Noted: 2023-10-23

## 2023-10-23 PROCEDURE — ? PHOTO-DOCUMENTATION

## 2023-10-23 PROCEDURE — 13121 CMPLX RPR S/A/L 2.6-7.5 CM: CPT

## 2023-10-23 PROCEDURE — 11423 EXC H-F-NK-SP B9+MARG 2.1-3: CPT

## 2023-10-23 PROCEDURE — ? EXCISION

## 2023-10-23 ASSESSMENT — LOCATION DETAILED DESCRIPTION DERM: LOCATION DETAILED: LEFT CENTRAL POSTAURICULAR SKIN

## 2023-10-23 ASSESSMENT — LOCATION SIMPLE DESCRIPTION DERM: LOCATION SIMPLE: SCALP

## 2023-10-23 ASSESSMENT — LOCATION ZONE DERM: LOCATION ZONE: SCALP

## 2023-10-23 NOTE — PROCEDURE: EXCISION

## 2024-01-22 ENCOUNTER — HOSPITAL ENCOUNTER (OUTPATIENT)
Dept: LAB | Facility: MEDICAL CENTER | Age: 44
End: 2024-01-22
Attending: NURSE PRACTITIONER
Payer: COMMERCIAL

## 2024-01-22 DIAGNOSIS — Z13.89 SCREENING FOR MULTIPLE CONDITIONS: ICD-10-CM

## 2024-01-22 DIAGNOSIS — Z13.21 ENCOUNTER FOR VITAMIN DEFICIENCY SCREENING: ICD-10-CM

## 2024-01-22 DIAGNOSIS — Z13.220 SCREENING FOR HYPERLIPIDEMIA: ICD-10-CM

## 2024-01-22 DIAGNOSIS — Z13.29 SCREENING FOR THYROID DISORDER: ICD-10-CM

## 2024-01-22 DIAGNOSIS — N20.0 CALCULUS OF KIDNEY: ICD-10-CM

## 2024-01-22 LAB
25(OH)D3 SERPL-MCNC: 24 NG/ML (ref 30–100)
ALBUMIN SERPL BCP-MCNC: 4.4 G/DL (ref 3.2–4.9)
ALBUMIN/GLOB SERPL: 1.3 G/DL
ALP SERPL-CCNC: 103 U/L (ref 30–99)
ALT SERPL-CCNC: 24 U/L (ref 2–50)
ANION GAP SERPL CALC-SCNC: 10 MMOL/L (ref 7–16)
APPEARANCE UR: CLEAR
AST SERPL-CCNC: 18 U/L (ref 12–45)
BILIRUB SERPL-MCNC: 0.6 MG/DL (ref 0.1–1.5)
BILIRUB UR QL STRIP.AUTO: NEGATIVE
BUN SERPL-MCNC: 10 MG/DL (ref 8–22)
CALCIUM ALBUM COR SERPL-MCNC: 8.7 MG/DL (ref 8.5–10.5)
CALCIUM SERPL-MCNC: 9 MG/DL (ref 8.5–10.5)
CHLORIDE SERPL-SCNC: 104 MMOL/L (ref 96–112)
CHOLEST SERPL-MCNC: 152 MG/DL (ref 100–199)
CO2 SERPL-SCNC: 24 MMOL/L (ref 20–33)
COLOR UR: YELLOW
CREAT SERPL-MCNC: 0.88 MG/DL (ref 0.5–1.4)
FASTING STATUS PATIENT QL REPORTED: NORMAL
GFR SERPLBLD CREATININE-BSD FMLA CKD-EPI: 83 ML/MIN/1.73 M 2
GLOBULIN SER CALC-MCNC: 3.3 G/DL (ref 1.9–3.5)
GLUCOSE SERPL-MCNC: 101 MG/DL (ref 65–99)
GLUCOSE UR STRIP.AUTO-MCNC: NEGATIVE MG/DL
HDLC SERPL-MCNC: 54 MG/DL
KETONES UR STRIP.AUTO-MCNC: NEGATIVE MG/DL
LDLC SERPL CALC-MCNC: 68 MG/DL
LEUKOCYTE ESTERASE UR QL STRIP.AUTO: NEGATIVE
MICRO URNS: NORMAL
NITRITE UR QL STRIP.AUTO: NEGATIVE
PH UR STRIP.AUTO: 5.5 [PH] (ref 5–8)
POTASSIUM SERPL-SCNC: 4.4 MMOL/L (ref 3.6–5.5)
PROT SERPL-MCNC: 7.7 G/DL (ref 6–8.2)
PROT UR QL STRIP: NEGATIVE MG/DL
RBC UR QL AUTO: NEGATIVE
SODIUM SERPL-SCNC: 138 MMOL/L (ref 135–145)
SP GR UR STRIP.AUTO: 1.02
T4 FREE SERPL-MCNC: 1.47 NG/DL (ref 0.93–1.7)
TRIGL SERPL-MCNC: 151 MG/DL (ref 0–149)
TSH SERPL DL<=0.005 MIU/L-ACNC: 1.29 UIU/ML (ref 0.38–5.33)
UROBILINOGEN UR STRIP.AUTO-MCNC: 0.2 MG/DL

## 2024-01-22 PROCEDURE — 84443 ASSAY THYROID STIM HORMONE: CPT

## 2024-01-22 PROCEDURE — 84439 ASSAY OF FREE THYROXINE: CPT

## 2024-01-22 PROCEDURE — 80061 LIPID PANEL: CPT

## 2024-01-22 PROCEDURE — 81003 URINALYSIS AUTO W/O SCOPE: CPT

## 2024-01-22 PROCEDURE — 82306 VITAMIN D 25 HYDROXY: CPT

## 2024-01-22 PROCEDURE — 80053 COMPREHEN METABOLIC PANEL: CPT

## 2024-01-22 PROCEDURE — 36415 COLL VENOUS BLD VENIPUNCTURE: CPT

## 2024-01-27 ENCOUNTER — TELEPHONE (OUTPATIENT)
Dept: MEDICAL GROUP | Facility: MEDICAL CENTER | Age: 44
End: 2024-01-27
Payer: COMMERCIAL

## 2024-02-01 NOTE — TELEPHONE ENCOUNTER
Called pt did not get a response left pt a detailed message, to call back to schedule the appointment.

## 2024-04-09 ENCOUNTER — OFFICE VISIT (OUTPATIENT)
Dept: MEDICAL GROUP | Facility: MEDICAL CENTER | Age: 44
End: 2024-04-09
Payer: COMMERCIAL

## 2024-04-09 VITALS
TEMPERATURE: 98.1 F | HEART RATE: 66 BPM | SYSTOLIC BLOOD PRESSURE: 118 MMHG | OXYGEN SATURATION: 96 % | DIASTOLIC BLOOD PRESSURE: 78 MMHG | HEIGHT: 68 IN | BODY MASS INDEX: 42.9 KG/M2 | WEIGHT: 283.1 LBS

## 2024-04-09 DIAGNOSIS — E55.9 VITAMIN D DEFICIENCY: ICD-10-CM

## 2024-04-09 DIAGNOSIS — M25.521 BILATERAL ELBOW JOINT PAIN: ICD-10-CM

## 2024-04-09 DIAGNOSIS — D12.5 ADENOMATOUS POLYP OF SIGMOID COLON: ICD-10-CM

## 2024-04-09 DIAGNOSIS — Z80.0 FAMILY HISTORY OF MALIGNANT NEOPLASM OF COLON: ICD-10-CM

## 2024-04-09 DIAGNOSIS — Z23 NEED FOR DIPHTHERIA-TETANUS-PERTUSSIS (TDAP) VACCINE: ICD-10-CM

## 2024-04-09 DIAGNOSIS — M25.522 BILATERAL ELBOW JOINT PAIN: ICD-10-CM

## 2024-04-09 DIAGNOSIS — Z84.89 FAMILY HISTORY OF CARCINOMA IN SITU OF BREAST: ICD-10-CM

## 2024-04-09 DIAGNOSIS — G89.29 CHRONIC PAIN OF RIGHT KNEE: ICD-10-CM

## 2024-04-09 DIAGNOSIS — M25.561 CHRONIC PAIN OF RIGHT KNEE: ICD-10-CM

## 2024-04-09 PROCEDURE — 90471 IMMUNIZATION ADMIN: CPT | Performed by: NURSE PRACTITIONER

## 2024-04-09 PROCEDURE — 90715 TDAP VACCINE 7 YRS/> IM: CPT | Performed by: NURSE PRACTITIONER

## 2024-04-09 PROCEDURE — 3078F DIAST BP <80 MM HG: CPT | Performed by: NURSE PRACTITIONER

## 2024-04-09 PROCEDURE — 99214 OFFICE O/P EST MOD 30 MIN: CPT | Mod: 25 | Performed by: NURSE PRACTITIONER

## 2024-04-09 PROCEDURE — 3074F SYST BP LT 130 MM HG: CPT | Performed by: NURSE PRACTITIONER

## 2024-04-09 ASSESSMENT — PATIENT HEALTH QUESTIONNAIRE - PHQ9: CLINICAL INTERPRETATION OF PHQ2 SCORE: 0

## 2024-04-09 ASSESSMENT — FIBROSIS 4 INDEX: FIB4 SCORE: 0.59

## 2024-04-09 NOTE — PROGRESS NOTES
Subjective:     Adele Kaplan is a 43 y.o. female who presents with cheryl elbow and rt knee pain.    HPI:     Seen in f/u for rt knee and cheryl elbow pain.  She is feeling well.      Problem #1:family history colon cancer and breast cancer  Paternal grandmother with breast cancer.  She is due next mammo in 12/24.    Paternal grandfather with colon cancer.  She had her 1st colonoscopy in 1/24. She had a pedunculated adenomatous polyp.  She is on recall in 3 years.  Maternal aunt x2 with cervical cancer  She was previously  reverred to [facility] but lost the number.  Will redo referral      Problem #2:right knee pain  She is having rt knee pain for several months.  Pain is in popliteal area.   No pain with ROM.  Will be aching during day      Problem #3:cheryl elbow pain  Sx started about 4 mo ago.  She types all day.  The pain can occur with or without typing.  Pain is mostly on medial forearm.  Occurring worse on left but also has some pain on rt. No numbness.  Not reproducible    Lab results reviewed with pt:  UA, TSH, T4, LP, CMP,GFR is wnl.  VITAMIN D is low at 24.  Not on otc supplement.    Patient Active Problem List    Diagnosis Date Noted    Endometriosis 09/18/2023    Hormone replacement therapy (HRT) 09/18/2023    Paternal family history of dementia 09/18/2023    Family history of carcinoma in situ of breast 09/18/2023    Family history of malignant neoplasm of colon 09/18/2023    Migraine syndrome     Kidney stone        Current medicines (including changes today)  Current Outpatient Medications   Medication Sig Dispense Refill    estradiol (VIVELLE DOT) 0.05 MG/24HR PATCH BIWEEKLY APPLY 1 PATCH TO SKIN TWICE A WEEK FOR 90 DAYS      progesterone (PROMETRIUM) 100 MG Cap Take 100 mg by mouth every day.       No current facility-administered medications for this visit.       No Known Allergies  Lipid Panel:  Lab Results   Component Value Date/Time    CHOLSTRLTOT 152 01/22/2024 1006    TRIGLYCERIDE 151 (H)  "01/22/2024 1006    LDL 68 01/22/2024 1006       Thyroid:  Lab Results   Component Value Date/Time    TSHULTRASEN 1.290 01/22/2024 1006     Lab Results   Component Value Date/Time    FREET4 1.47 01/22/2024 1006     Complete Metabolic Panel:  Lab Results   Component Value Date/Time    SODIUM 138 01/22/2024 10:06 AM    POTASSIUM 4.4 01/22/2024 10:06 AM    CHLORIDE 104 01/22/2024 10:06 AM    CO2 24 01/22/2024 10:06 AM    ANION 10.0 01/22/2024 10:06 AM    GLUCOSE 101 (H) 01/22/2024 10:06 AM    BUN 10 01/22/2024 10:06 AM    CREATININE 0.88 01/22/2024 10:06 AM    ASTSGOT 18 01/22/2024 10:06 AM    ALTSGPT 24 01/22/2024 10:06 AM    TBILIRUBIN 0.6 01/22/2024 10:06 AM    ALBUMIN 4.4 01/22/2024 10:06 AM    TOTPROTEIN 7.7 01/22/2024 10:06 AM    GLOBULIN 3.3 01/22/2024 10:06 AM    AGRATIO 1.3 01/22/2024 10:06 AM         Vitamin D:    Lab Results   Component Value Date/Time    25HYDROXY 24 (L) 01/22/2024 1006       GFR:    Lab Results   Component Value Date/Time    GFRCKD 83 01/22/2024 1006       ROS  Constitutional: Negative. Negative for fever, chills, weight loss, malaise/fatigue and diaphoresis.   HENT: Negative. Negative for hearing loss, ear pain, nosebleeds, congestion, sore throat, neck pain, tinnitus and ear discharge.   Respiratory: Negative. Negative for cough, hemoptysis, sputum production, shortness of breath, wheezing and stridor.   Cardiovascular: Negative. Negative for chest pain, palpitations, orthopnea, claudication, leg swelling and PND.   Gastrointestinal: Denies nausea, vomiting, diarrhea, constipation, heartburn, melena or hematochezia.  Genitourinary: Denies dysuria, hematuria, urinary incontinence, frequency or urgency.        Objective:     /78 (BP Location: Left arm, Patient Position: Sitting, BP Cuff Size: Large adult)   Pulse 66   Temp 36.7 °C (98.1 °F) (Temporal)   Ht 1.727 m (5' 8\")   Wt (!) 128 kg (283 lb 1.6 oz)   SpO2 96%  Body mass index is 43.05 kg/m².    Physical Exam:  Vitals " reviewed.  Constitutional: Oriented to person, place, and time. appears well-developed and well-nourished. No distress.   Cardiovascular: Normal rate, regular rhythm, normal heart sounds and intact distal pulses. Exam reveals no gallop and no friction rub. No murmur heard. No carotid bruits.   Pulmonary/Chest: Effort normal and breath sounds normal. No stridor. No respiratory distress. no wheezes or rales. exhibits no tenderness.   Musculoskeletal: rt knee range of motion. exhibits no edema. cheryl pedal pulses 2+.  Lymphadenopathy: No cervical or supraclavicular adenopathy.   Neurological: Alert and oriented to person, place, and time. exhibits normal muscle tone.  Skin: Skin is warm and dry. No diaphoresis.   Psychiatric: Normal mood and affect. Behavior is normal.      Assessment and Plan:     The following treatment plan was discussed:    1. Bilateral elbow joint pain  DX-ELBOW-COMPLETE 3+ LEFT    Referral to Orthopedics    xray left elbow. f/u w/pt w/results. refer ortho for further eval cheryl elbow pain & rt knee.      2. Chronic pain of right knee  DX-KNEE COMPLETE 4+ RIGHT    Referral to Orthopedics    xray rt knee. f/u w/pt w/results. refer ortho for eval      3. Vitamin D deficiency      take vitamin d3 4000 units daily x 1 mo then 2000 units daily.      4. Adenomatous polyp of sigmoid colon      + FH, adenomatous polyp. recall colonoscopy in 3 yrs.      5. Family history of carcinoma in situ of breast  Referral to Genetic Research Studies    paternal grandmother with breast cancer. refer [facility]. do yearly mammo in 12/24      6. Family history of malignant neoplasm of colon  Referral to Genetic Research Studies    paternal grandfather with colon cancer. plan repeat colonoscopy w/DHA in 3 yrs      7. Need for diphtheria-tetanus-pertussis (Tdap) vaccine  Tdap =>8yo IM    Tdap given today            Followup: Return in about 1 year (around 4/9/2025), or email for lab slip.

## 2024-05-06 ENCOUNTER — APPOINTMENT (OUTPATIENT)
Dept: RADIOLOGY | Facility: MEDICAL CENTER | Age: 44
End: 2024-05-06
Attending: NURSE PRACTITIONER
Payer: COMMERCIAL

## 2024-05-06 DIAGNOSIS — M25.521 BILATERAL ELBOW JOINT PAIN: ICD-10-CM

## 2024-05-06 DIAGNOSIS — M25.561 CHRONIC PAIN OF RIGHT KNEE: ICD-10-CM

## 2024-05-06 DIAGNOSIS — M25.522 BILATERAL ELBOW JOINT PAIN: ICD-10-CM

## 2024-05-06 DIAGNOSIS — G89.29 CHRONIC PAIN OF RIGHT KNEE: ICD-10-CM

## 2024-05-14 ENCOUNTER — TELEPHONE (OUTPATIENT)
Dept: MEDICAL GROUP | Facility: MEDICAL CENTER | Age: 44
End: 2024-05-14
Payer: COMMERCIAL

## 2024-05-14 NOTE — TELEPHONE ENCOUNTER
----- Message from RUKHSANA Mckeon sent at 5/13/2024  7:44 PM PDT -----  Please let pt know that her left elbow xray is normal.  
Caller Name: Adele Nika Jackson    Call Back Number: [phone] (home)       How would the patient prefer to be contacted with a response: Phone call OK to leave a detailed message    Left pt VM regarding result notes Xray Normal   
Detail Level: Generalized
Detail Level: Zone
Detail Level: Simple

## 2024-05-14 NOTE — TELEPHONE ENCOUNTER
----- Message from RUKHSANA Mckeon sent at 5/13/2024  7:36 PM PDT -----  Please call pt and let her know that her right knee xray is normal.

## 2024-05-14 NOTE — TELEPHONE ENCOUNTER
Caller Name: Adele Nika Jackson    Call Back Number: [phone] (home)       How would the patient prefer to be contacted with a response: Phone call OK to leave a detailed message    Left pt VM regarding result notes

## 2024-06-20 ENCOUNTER — TELEPHONE (OUTPATIENT)
Dept: MEDICAL GROUP | Facility: MEDICAL CENTER | Age: 44
End: 2024-06-20

## 2024-06-20 NOTE — TELEPHONE ENCOUNTER
----- Message from RUKHSANA Mckeon sent at 5/13/2024  7:44 PM PDT -----  Please let pt know that her left elbow xray is normal.

## 2024-10-09 NOTE — PROGRESS NOTES
Left for CT at approx 1245 with saturation and ETCO2 monitoring. Saturation stayed 100% throughout transport to CT, during test, and transport back to icu. ETC2 ranged between 28mmhg-37mmhg. Patient tolerate transport and procedure very well. Patient placed back on previous ventilator settings approx 1325. Patient was transport with portable ventilator with settings as on ventilator orders.    [FreeTextEntry1] : 10/9/24 -   9/9/24 - Patient reports recent cold. She had echocardiogram done on 7/9/24 and nuclear stress test on 7/25/24 which were okay. I advised patient to start on ASA 81 mg.    8/19/24 - Pt reports 2 episodes of pre-syncope, one is last year, another one is on 6/12/24, each time lasting for a few minutes. Pt reports felt sudden SSCP and abdominal discomfort, lasting for a few seconds before the pre-syncope episode. Pt denies SOB or palpitation. Pt denies h/o syncope.  Pt had workup with Dr. NATE Jay.  Carotid Doppler showed retrograde flow of the left vertebral artery.  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  Pt drinks coffee 1 cup/day.  Pt is on Losartan 25mg, Lipitor 10mg, Levothyroxine 50mcg.  Today's /64 P 63.  Right brachial BP -120 left brachial BP -80.  Patient was not orthostatic (-150-140).  Exam unremarkable.  ECG showed no ischemic changes.  I advised patient to undergo an echocardiogram.  I advised patient to wear a 7-day event monitor.  I advised patient to consider intervention of the left subclavian artery stenosis.  Patient wore a 7-day event monitor and it showed average HR of 60, rare APC's, rare PVC's, and 6 short runs of PAT (20 beats the longest). No pauses.

## (undated) DEVICE — C-ARM: Brand: UNBRANDED

## (undated) DEVICE — BIT DRL L140MM DIA2MM QUIK CPL 3 FLUT CALIB DEPTH MRK W/O

## (undated) DEVICE — GLOVE SURG SZ 85 L12IN FNGR THK94MIL TRNSLUC YEL LTX

## (undated) DEVICE — Z DISCONTINUED PER MEDLINE USE 2718072 DRESSING FOAM W5XL12.5CM SIL ADH THN BORDED CNFRM LO PROF

## (undated) DEVICE — CHLORAPREP 26ML ORANGE

## (undated) DEVICE — SOLUTION IV 1000ML 0.9% SOD CHL FOR IRRIG PLAS CONT

## (undated) DEVICE — K WIRE FIX L150MM DIA1.6MM S STL THRD TRCR PNT
Type: IMPLANTABLE DEVICE | Site: ANKLE | Status: NON-FUNCTIONAL
Removed: 2019-11-25

## (undated) DEVICE — BANDAGE COMPR W3INXL15FT BGE E SGL LAYERED CLP CLSR

## (undated) DEVICE — BIT DRL L125MM DIA2MM QUIK CPL W/O STP REUSE

## (undated) DEVICE — SYSTEM SKIN CLSR 22CM DERMBND PRINEO

## (undated) DEVICE — WRAP AROUND LENS-STERLIE

## (undated) DEVICE — ZIMMER® STERILE DISPOSABLE TOURNIQUET CUFF WITH PLC, DUAL PORT, SINGLE BLADDER, 34 IN. (86 CM)

## (undated) DEVICE — C-ARMOR C-ARM EQUIPMENT COVERS CLEAR STERILE UNIVERSAL FIT 12 PER CASE: Brand: C-ARMOR

## (undated) DEVICE — BIT DRL L110MM DIA2.5MM G QUIK CPL W/O STP REUSE

## (undated) DEVICE — GUIDEWIRE ORTH L150MM DIA1.25MM S STL THRD FOR 4MM CANN SCR

## (undated) DEVICE — SURGICAL PROCEDURE PACK LOWER EXTREMITY LOURDES HOSP

## (undated) DEVICE — SUTURE VCRL SZ 2-0 L36IN ABSRB UD L36MM CT-1 1/2 CIR J945H

## (undated) DEVICE — SUTURE VCRL SZ 3-0 L27IN ABSRB UD L26MM SH 1/2 CIR J416H

## (undated) DEVICE — SUTURE VCRL SZ 3-0 L27IN ABSRB UD L19MM PS-2 3/8 CIR PRIM J427H

## (undated) DEVICE — BIT DRL L125MM DIA2.7MM QUIK CPL 3 FLUT

## (undated) DEVICE — BIT DRL L160MM DIA2.7MM CANN QUIK CPL ADJ STP REUSE FOR

## (undated) DEVICE — SOLUTION IV IRRIG POUR BRL 0.9% SODIUM CHL 2F7124